# Patient Record
Sex: FEMALE | Race: WHITE | NOT HISPANIC OR LATINO
[De-identification: names, ages, dates, MRNs, and addresses within clinical notes are randomized per-mention and may not be internally consistent; named-entity substitution may affect disease eponyms.]

---

## 2017-02-13 ENCOUNTER — NON-APPOINTMENT (OUTPATIENT)
Age: 67
End: 2017-02-13

## 2017-02-13 ENCOUNTER — APPOINTMENT (OUTPATIENT)
Dept: CARDIOLOGY | Facility: CLINIC | Age: 67
End: 2017-02-13

## 2017-02-13 VITALS
HEART RATE: 59 BPM | DIASTOLIC BLOOD PRESSURE: 83 MMHG | BODY MASS INDEX: 27.56 KG/M2 | WEIGHT: 146 LBS | OXYGEN SATURATION: 96 % | SYSTOLIC BLOOD PRESSURE: 163 MMHG | HEIGHT: 61 IN

## 2017-02-17 ENCOUNTER — APPOINTMENT (OUTPATIENT)
Dept: CARDIOLOGY | Facility: CLINIC | Age: 67
End: 2017-02-17

## 2017-03-02 ENCOUNTER — MEDICATION RENEWAL (OUTPATIENT)
Age: 67
End: 2017-03-02

## 2017-07-26 ENCOUNTER — OTHER (OUTPATIENT)
Age: 67
End: 2017-07-26

## 2017-08-28 ENCOUNTER — APPOINTMENT (OUTPATIENT)
Dept: CARDIOLOGY | Facility: CLINIC | Age: 67
End: 2017-08-28
Payer: MEDICARE

## 2017-08-28 PROCEDURE — 93015 CV STRESS TEST SUPVJ I&R: CPT

## 2017-08-28 PROCEDURE — 78452 HT MUSCLE IMAGE SPECT MULT: CPT

## 2017-08-28 PROCEDURE — A9500: CPT

## 2017-10-17 ENCOUNTER — APPOINTMENT (OUTPATIENT)
Dept: OPHTHALMOLOGY | Facility: CLINIC | Age: 67
End: 2017-10-17
Payer: MEDICARE

## 2017-10-17 PROCEDURE — 92060 SENSORIMOTOR EXAMINATION: CPT

## 2017-10-17 PROCEDURE — 99204 OFFICE O/P NEW MOD 45 MIN: CPT

## 2017-11-13 ENCOUNTER — APPOINTMENT (OUTPATIENT)
Dept: OPHTHALMOLOGY | Facility: CLINIC | Age: 67
End: 2017-11-13
Payer: MEDICARE

## 2017-11-13 PROCEDURE — 92012 INTRM OPH EXAM EST PATIENT: CPT

## 2017-11-13 PROCEDURE — 92060 SENSORIMOTOR EXAMINATION: CPT

## 2017-11-13 PROCEDURE — V2718: CPT

## 2017-11-16 ENCOUNTER — MEDICATION RENEWAL (OUTPATIENT)
Age: 67
End: 2017-11-16

## 2017-12-15 ENCOUNTER — APPOINTMENT (OUTPATIENT)
Dept: OPHTHALMOLOGY | Facility: CLINIC | Age: 67
End: 2017-12-15
Payer: MEDICARE

## 2017-12-15 PROCEDURE — 92012 INTRM OPH EXAM EST PATIENT: CPT

## 2017-12-15 PROCEDURE — 92060 SENSORIMOTOR EXAMINATION: CPT

## 2018-08-07 ENCOUNTER — APPOINTMENT (OUTPATIENT)
Dept: CARDIOLOGY | Facility: CLINIC | Age: 68
End: 2018-08-07
Payer: MEDICARE

## 2018-08-07 ENCOUNTER — NON-APPOINTMENT (OUTPATIENT)
Age: 68
End: 2018-08-07

## 2018-08-07 VITALS
HEART RATE: 66 BPM | OXYGEN SATURATION: 97 % | DIASTOLIC BLOOD PRESSURE: 70 MMHG | WEIGHT: 138 LBS | HEIGHT: 61 IN | SYSTOLIC BLOOD PRESSURE: 147 MMHG | BODY MASS INDEX: 26.06 KG/M2

## 2018-08-07 PROCEDURE — 93000 ELECTROCARDIOGRAM COMPLETE: CPT

## 2018-08-07 PROCEDURE — 99214 OFFICE O/P EST MOD 30 MIN: CPT

## 2018-08-23 ENCOUNTER — APPOINTMENT (OUTPATIENT)
Dept: ENDOCRINOLOGY | Facility: CLINIC | Age: 68
End: 2018-08-23
Payer: MEDICARE

## 2018-08-23 PROCEDURE — 76536 US EXAM OF HEAD AND NECK: CPT

## 2018-09-13 ENCOUNTER — APPOINTMENT (OUTPATIENT)
Dept: CARDIOLOGY | Facility: CLINIC | Age: 68
End: 2018-09-13
Payer: MEDICARE

## 2018-09-13 PROCEDURE — 93306 TTE W/DOPPLER COMPLETE: CPT

## 2018-10-08 ENCOUNTER — RESULT CHARGE (OUTPATIENT)
Age: 68
End: 2018-10-08

## 2018-10-17 ENCOUNTER — APPOINTMENT (OUTPATIENT)
Dept: INTERNAL MEDICINE | Facility: CLINIC | Age: 68
End: 2018-10-17
Payer: MEDICARE

## 2018-10-17 VITALS
DIASTOLIC BLOOD PRESSURE: 68 MMHG | TEMPERATURE: 98.3 F | BODY MASS INDEX: 24.55 KG/M2 | HEIGHT: 61 IN | WEIGHT: 130 LBS | SYSTOLIC BLOOD PRESSURE: 118 MMHG

## 2018-10-17 PROCEDURE — 99213 OFFICE O/P EST LOW 20 MIN: CPT

## 2018-10-17 RX ORDER — CYCLOSPORINE 0.5 MG/ML
0.05 EMULSION OPHTHALMIC
Qty: 180 | Refills: 0 | Status: ACTIVE | COMMUNITY
Start: 2018-08-16

## 2018-10-17 RX ORDER — CONJUGATED ESTROGENS 0.62 MG/G
0.62 CREAM VAGINAL
Qty: 30 | Refills: 0 | Status: ACTIVE | COMMUNITY
Start: 2018-06-01

## 2018-10-17 NOTE — HISTORY OF PRESENT ILLNESS
[FreeTextEntry8] : 69 y/o female pt present with complaints of being off balance.  She started Myrbetriq last week and now dizzy with HA.  Read s/e and saw HA/dizziness on the list.  Her dizziness feels like vertigo, which she's had in the past.  No nausea.  Felt dizzy in bed.

## 2018-10-17 NOTE — PHYSICAL EXAM
[No Acute Distress] : no acute distress [Normal Sclera/Conjunctiva] : normal sclera/conjunctiva [EOMI] : extraocular movements intact [Normal Outer Ear/Nose] : the outer ears and nose were normal in appearance [Normal Oropharynx] : the oropharynx was normal [Supple] : supple [Clear to Auscultation] : lungs were clear to auscultation bilaterally [Normal Rate] : normal rate  [Regular Rhythm] : with a regular rhythm [No Carotid Bruits] : no carotid bruits [No Edema] : there was no peripheral edema [Normal Anterior Cervical Nodes] : no anterior cervical lymphadenopathy

## 2018-10-18 ENCOUNTER — RX RENEWAL (OUTPATIENT)
Age: 68
End: 2018-10-18

## 2018-10-25 ENCOUNTER — APPOINTMENT (OUTPATIENT)
Dept: ENDOCRINOLOGY | Facility: CLINIC | Age: 68
End: 2018-10-25
Payer: MEDICARE

## 2018-10-25 VITALS
WEIGHT: 132 LBS | HEART RATE: 68 BPM | SYSTOLIC BLOOD PRESSURE: 134 MMHG | HEIGHT: 61 IN | BODY MASS INDEX: 24.92 KG/M2 | DIASTOLIC BLOOD PRESSURE: 56 MMHG

## 2018-10-25 LAB
GLUCOSE BLDC GLUCOMTR-MCNC: 42
GLUCOSE BLDC GLUCOMTR-MCNC: 96

## 2018-10-25 PROCEDURE — 99214 OFFICE O/P EST MOD 30 MIN: CPT | Mod: 25

## 2018-10-25 PROCEDURE — 82962 GLUCOSE BLOOD TEST: CPT

## 2018-10-25 RX ORDER — DOXYCYCLINE HYCLATE 100 MG/1
100 CAPSULE ORAL
Refills: 0 | Status: DISCONTINUED | COMMUNITY
Start: 2017-02-13 | End: 2018-10-25

## 2018-10-31 ENCOUNTER — APPOINTMENT (OUTPATIENT)
Dept: INTERNAL MEDICINE | Facility: CLINIC | Age: 68
End: 2018-10-31
Payer: MEDICARE

## 2018-10-31 DIAGNOSIS — Z23 ENCOUNTER FOR IMMUNIZATION: ICD-10-CM

## 2018-10-31 PROCEDURE — G0008: CPT

## 2018-10-31 PROCEDURE — 90662 IIV NO PRSV INCREASED AG IM: CPT

## 2018-11-27 ENCOUNTER — APPOINTMENT (OUTPATIENT)
Dept: ENDOCRINOLOGY | Facility: CLINIC | Age: 68
End: 2018-11-27
Payer: MEDICARE

## 2018-11-27 PROCEDURE — 97803 MED NUTRITION INDIV SUBSEQ: CPT

## 2018-12-05 ENCOUNTER — MEDICATION RENEWAL (OUTPATIENT)
Age: 68
End: 2018-12-05

## 2019-01-18 ENCOUNTER — RECORD ABSTRACTING (OUTPATIENT)
Age: 69
End: 2019-01-18

## 2019-02-01 ENCOUNTER — APPOINTMENT (OUTPATIENT)
Dept: ENDOCRINOLOGY | Facility: CLINIC | Age: 69
End: 2019-02-01
Payer: MEDICARE

## 2019-02-01 VITALS
DIASTOLIC BLOOD PRESSURE: 70 MMHG | HEART RATE: 64 BPM | SYSTOLIC BLOOD PRESSURE: 118 MMHG | WEIGHT: 133 LBS | HEIGHT: 61 IN | BODY MASS INDEX: 25.11 KG/M2 | OXYGEN SATURATION: 98 %

## 2019-02-01 LAB
CHOLEST SERPL-MCNC: 172
HBA1C MFR BLD HPLC: 7.6
LDLC SERPL DIRECT ASSAY-MCNC: 64

## 2019-02-01 PROCEDURE — 82962 GLUCOSE BLOOD TEST: CPT

## 2019-02-01 PROCEDURE — 99215 OFFICE O/P EST HI 40 MIN: CPT | Mod: 25

## 2019-02-01 RX ORDER — HUMAN INSULIN 100 [IU]/ML
100 INJECTION, SUSPENSION SUBCUTANEOUS
Refills: 0 | Status: DISCONTINUED | COMMUNITY
End: 2019-02-01

## 2019-02-01 RX ORDER — LEVOTHYROXINE SODIUM 137 UG/1
137 TABLET ORAL
Refills: 0 | Status: DISCONTINUED | COMMUNITY
End: 2019-02-01

## 2019-02-01 NOTE — REVIEW OF SYSTEMS
[Blurry Vision] : blurred vision [Negative] : Heme/Lymph [FreeTextEntry3] : alittle blurry vision at times

## 2019-02-01 NOTE — ASSESSMENT
[FreeTextEntry1] : T1DM-  suboptimal control\par  pt will call medtronic to see if can get 630 G at [place] \par can have sensor with pump\par middday test reviewd \par \par Increase basla rates as follows \par 8Am- 0.5\par 9 Am - 0.65\par 1030AM 1.15.\par 1230PM 0.9\par \par HypothryoidCont synthroid 0.150 mg qd\par stop alpha lipodic acid as may affect thyroid levels \par Anmeia see pMD \par  spasm in legs hydrate see pMD\par  Graves opthalmopathy- stable- never treated for hyperthyridism in past - only hypothyridism Pt with antibodies for both GD and HT \par

## 2019-02-01 NOTE — PHYSICAL EXAM
[Alert] : alert [No Acute Distress] : no acute distress [Well Nourished] : well nourished [Well Developed] : well developed [Normal Sclera/Conjunctiva] : normal sclera/conjunctiva [EOMI] : extra ocular movement intact [No Proptosis] : no proptosis [Thyroid Not Enlarged] : the thyroid was not enlarged [No Thyroid Nodules] : there were no palpable thyroid nodules [No Respiratory Distress] : no respiratory distress [No Accessory Muscle Use] : no accessory muscle use [Clear to Auscultation] : lungs were clear to auscultation bilaterally [Normal Rate] : heart rate was normal  [Normal S1, S2] : normal S1 and S2 [Regular Rhythm] : with a regular rhythm [Pedal Pulses Normal] : the pedal pulses are present [No Edema] : there was no peripheral edema [Post Cervical Nodes] : posterior cervical nodes [Anterior Cervical Nodes] : anterior cervical nodes [Normal] : normal and non tender [No Spinal Tenderness] : no spinal tenderness [Spine Straight] : spine straight [No Stigmata of Cushings Syndrome] : no stigmata of cushings syndrome [No Rash] : no rash [Normal Reflexes] : deep tendon reflexes were 2+ and symmetric [Oriented x3] : oriented to person, place, and time [Acanthosis Nigricans] : no acanthosis nigricans

## 2019-02-04 LAB — GLUCOSE BLDC GLUCOMTR-MCNC: 96

## 2019-04-03 ENCOUNTER — RX RENEWAL (OUTPATIENT)
Age: 69
End: 2019-04-03

## 2019-04-08 ENCOUNTER — RX CHANGE (OUTPATIENT)
Age: 69
End: 2019-04-08

## 2019-04-08 ENCOUNTER — RX RENEWAL (OUTPATIENT)
Age: 69
End: 2019-04-08

## 2019-04-08 ENCOUNTER — MEDICATION RENEWAL (OUTPATIENT)
Age: 69
End: 2019-04-08

## 2019-04-09 ENCOUNTER — RX RENEWAL (OUTPATIENT)
Age: 69
End: 2019-04-09

## 2019-04-22 ENCOUNTER — RX RENEWAL (OUTPATIENT)
Age: 69
End: 2019-04-22

## 2019-05-01 ENCOUNTER — APPOINTMENT (OUTPATIENT)
Dept: ENDOCRINOLOGY | Facility: CLINIC | Age: 69
End: 2019-05-01
Payer: MEDICARE

## 2019-05-01 VITALS
DIASTOLIC BLOOD PRESSURE: 64 MMHG | WEIGHT: 137 LBS | SYSTOLIC BLOOD PRESSURE: 120 MMHG | HEART RATE: 67 BPM | BODY MASS INDEX: 25.86 KG/M2 | HEIGHT: 61 IN

## 2019-05-01 LAB — GLUCOSE BLDC GLUCOMTR-MCNC: 177

## 2019-05-01 PROCEDURE — 99214 OFFICE O/P EST MOD 30 MIN: CPT | Mod: 25

## 2019-05-01 PROCEDURE — 82962 GLUCOSE BLOOD TEST: CPT

## 2019-05-01 PROCEDURE — 95251 CONT GLUC MNTR ANALYSIS I&R: CPT

## 2019-05-01 RX ORDER — AZELASTINE HYDROCHLORIDE 137 UG/1
137 SPRAY, METERED NASAL
Qty: 30 | Refills: 0 | Status: DISCONTINUED | COMMUNITY
Start: 2018-07-12 | End: 2019-05-01

## 2019-05-01 NOTE — REVIEW OF SYSTEMS
[Recent Weight Gain (___ Lbs)] : recent [unfilled] ~Ulb weight gain [Dry Eyes] : dryness of the eyes [Constipation] : constipation [Dry Skin] : dry skin [Anxiety] : anxiety [Fatigue] : no fatigue [Decreased Appetite] : appetite not decreased [Visual Field Defect] : no visual field defect [Blurry Vision] : no blurred vision [Dysphagia] : no dysphagia [Dysphonia] : no dysphonia [Neck Pain] : no neck pain [Chest Pain] : no chest pain [Palpitations] : no palpitations [SOB on Exertion] : no shortness of breath during exertion [Polyuria] : no polyuria [Diarrhea] : no diarrhea [Dysuria] : no dysuria [Hair Loss] : no hair loss [Tremors] : no tremors [Headache] : no headaches [Depression] : no depression [Polydipsia] : no polydipsia [Cold Intolerance] : cold tolerant [Heat Intolerance] : heat tolerant [Easy Bruising] : no tendency for easy bruising [Swelling] : no swelling [FreeTextEntry7] : sometimes  [de-identified] : at times

## 2019-05-01 NOTE — HISTORY OF PRESENT ILLNESS
[FreeTextEntry1] : Quality: Type 1 DM\par Severity: moderate\par Duration: 48 years\par Onset: legs cramps\par Modifying Factors: Better with insulin \par Associated Symptoms:\par \par Current Regimen:\par Novolog via Medtronic pump \par \par - Levothyroxine 150 mcg daily \par \par Self blood sugar monitorin-10 times a day, per Medtronic pump download - high blood sugar at bedtime and over night, \par 3 inconsistent lows that were corrected with food\par pt. reports a lot of bubbles in the tube when changing the pump site\par \par exercise: yuridia, drums, 3-4 times a week\par \par Diet:\par B- dry cereal, oatmeal, toast -low carb bread\par L- salad, fish, \par D- fish with vegetable, starch, no too much pasta\par Snacks- fruit, sugar free Jello, thin sugar free cookies\par \par Date of last eye exam: 2019 (-) diabetic retinopathy \par Date of last foot exam: 2019\par Date of last flu vaccine: 2018\par Date of last Pneumovax: 5 years ago

## 2019-05-01 NOTE — ASSESSMENT
[FreeTextEntry1] : 69 y/o female with Type 1 DM, Hypothyroidism, and Hyperlipidemia. Labs reviewed from 4/27/19 - FBG 98, chol 187, LDL 61, A1C 7.4%, anemic, TSH 4.89, and Vitamin D 48\par \par Plan: \par Type 1 DM: adjusted Medtronic pump basal settings:\par 20:00 0.700 to 0.800\par 22:00 0.850 to 0.900\par 23:00 0.900 to 1.00\par - continue all other pump settings - see attached\par - educated on healthy food choices\par - encouraged to continue routine exercise\par - schedule appointment with CDE for pump upgrade and sensor use, along with pump download at time of visit\par \par Hypothyroidism: Increase Levothyroxine to 150 mcg Monday-Saturday and 2 tablets on Sunday\par - repeat TFTs in 4 weeks\par \par Hyperlipidemia: monitor labs, continue Praluent\par \par Labs and follow up visit in 3 months.

## 2019-05-01 NOTE — PHYSICAL EXAM
[Alert] : alert [No Acute Distress] : no acute distress [Well Developed] : well developed [Well Nourished] : well nourished [Normal Sclera/Conjunctiva] : normal sclera/conjunctiva [EOMI] : extra ocular movement intact [Supple] : the neck was supple [Normal Hearing] : hearing was normal [Thyroid Not Enlarged] : the thyroid was not enlarged [No LAD] : no lymphadenopathy [Normal Rate and Effort] : normal respiratory rhythm and effort [No Thyroid Nodules] : there were no palpable thyroid nodules [Clear to Auscultation] : lungs were clear to auscultation bilaterally [No Accessory Muscle Use] : no accessory muscle use [Normal Rate] : heart rate was normal  [Regular Rhythm] : with a regular rhythm [Normal S1, S2] : normal S1 and S2 [Pedal Pulses Normal] : the pedal pulses are present [No Edema] : there was no peripheral edema [Normal Bowel Sounds] : normal bowel sounds [Not Tender] : non-tender [Soft] : abdomen soft [Normal Gait] : normal gait [No Rash] : no rash [Acanthosis Nigricans] : no acanthosis nigricans [Right Foot Was Examined] : right foot ~C was examined [Left Foot Was Examined] : left foot ~C was examined [Normal] : normal [Full ROM] : with full range of motion [Diminished Throughout Both Feet] : normal tactile sensation with monofilament testing throughout both feet [No Tremors] : no tremors [No Motor Deficits] : the motor exam was normal [Oriented x3] : oriented to person, place, and time [Normal Insight/Judgement] : insight and judgment were intact [Normal Mood] : the mood was normal

## 2019-05-01 NOTE — REASON FOR VISIT
[Follow-Up: _____] : a [unfilled] follow-up visit [FreeTextEntry1] : Type 1 DM, Hypothyroidism, and Hyperlipidemia.

## 2019-05-03 ENCOUNTER — MEDICATION RENEWAL (OUTPATIENT)
Age: 69
End: 2019-05-03

## 2019-05-30 ENCOUNTER — APPOINTMENT (OUTPATIENT)
Dept: ENDOCRINOLOGY | Facility: CLINIC | Age: 69
End: 2019-05-30
Payer: MEDICARE

## 2019-05-30 PROCEDURE — G0108 DIAB MANAGE TRN  PER INDIV: CPT

## 2019-06-02 ENCOUNTER — OTHER (OUTPATIENT)
Age: 69
End: 2019-06-02

## 2019-06-04 LAB — HBA1C MFR BLD HPLC: 7.4

## 2019-06-05 ENCOUNTER — MEDICATION RENEWAL (OUTPATIENT)
Age: 69
End: 2019-06-05

## 2019-08-29 ENCOUNTER — APPOINTMENT (OUTPATIENT)
Dept: ENDOCRINOLOGY | Facility: CLINIC | Age: 69
End: 2019-08-29
Payer: MEDICARE

## 2019-08-29 VITALS
DIASTOLIC BLOOD PRESSURE: 60 MMHG | WEIGHT: 135 LBS | SYSTOLIC BLOOD PRESSURE: 100 MMHG | HEART RATE: 65 BPM | BODY MASS INDEX: 25.49 KG/M2 | HEIGHT: 61 IN

## 2019-08-29 LAB — GLUCOSE BLDC GLUCOMTR-MCNC: 124

## 2019-08-29 PROCEDURE — 82962 GLUCOSE BLOOD TEST: CPT

## 2019-08-29 PROCEDURE — 95251 CONT GLUC MNTR ANALYSIS I&R: CPT

## 2019-08-29 PROCEDURE — 99214 OFFICE O/P EST MOD 30 MIN: CPT | Mod: 25

## 2019-09-03 ENCOUNTER — TRANSCRIPTION ENCOUNTER (OUTPATIENT)
Age: 69
End: 2019-09-03

## 2019-09-05 ENCOUNTER — OTHER (OUTPATIENT)
Age: 69
End: 2019-09-05

## 2019-09-05 ENCOUNTER — NON-APPOINTMENT (OUTPATIENT)
Age: 69
End: 2019-09-05

## 2019-09-05 ENCOUNTER — APPOINTMENT (OUTPATIENT)
Dept: CARDIOLOGY | Facility: CLINIC | Age: 69
End: 2019-09-05
Payer: MEDICARE

## 2019-09-05 VITALS
HEIGHT: 61 IN | DIASTOLIC BLOOD PRESSURE: 56 MMHG | SYSTOLIC BLOOD PRESSURE: 153 MMHG | BODY MASS INDEX: 26.06 KG/M2 | OXYGEN SATURATION: 99 % | HEART RATE: 84 BPM | WEIGHT: 138 LBS

## 2019-09-05 PROCEDURE — 99214 OFFICE O/P EST MOD 30 MIN: CPT

## 2019-09-05 PROCEDURE — 93000 ELECTROCARDIOGRAM COMPLETE: CPT

## 2019-09-09 ENCOUNTER — MEDICATION RENEWAL (OUTPATIENT)
Age: 69
End: 2019-09-09

## 2019-09-23 ENCOUNTER — MEDICATION RENEWAL (OUTPATIENT)
Age: 69
End: 2019-09-23

## 2019-10-04 NOTE — ASSESSMENT
[FreeTextEntry1] : 67 y/o female with Type 1 DM, Hypothyroidism, and Hyperlipidemia. \par \par Plan: \par Type 1 DM: adjusted Medtronic pump  carb ratio adjusted in afternoon \par change segment times on pump-  carb ratio  and  reduced carb  ratio to make less aggressive in afternoon \par  12 Am  to 6 Am 10\par 6AM to 12 PM 14 \par 12PM- 4PM 16 \par 4pm -12AM 18  (same) \par \par Rose reviewd  avg   +/- 72.1  Coeff of variaiton 42.9 \par 41% of time above target \par 54% in targert range\par below target5% \par worn 97% of time  \par \par - educated on healthy food choices\par - encouraged to continue routine exercise\par \par \par Hypothyroidism: Cont Lt4  150 mcg Monday-Saturday and 2 tablets on Sunday\par - repeat TFTs in 4 weeks\par \par 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

## 2019-10-04 NOTE — REASON FOR VISIT
[Follow-Up: _____] : a [unfilled] follow-up visit [FreeTextEntry1] : Type 1 DM, Hypothyroidism, and Hyperlipidemia.   Gravesophtalmopathy

## 2019-10-04 NOTE — HISTORY OF PRESENT ILLNESS
[FreeTextEntry1] : Quality: Type 1 DM\par Severity: moderate\par Duration: 48 years\par Onset: legs cramps\par Modifying Factors: Better with insulin \par Associated Symptoms:\par \par Current Regimen:\par Novolog via Medtronic pump \par \par - Levothyroxine 150 mcg daily \par \par Self blood sugar monitorin-10 times a day, per Medtronic pump download - high blood sugar at bedtime and over night, \par 3 inconsistent lows that were corrected with food\par pt. reports a lot of bubbles in the tube when changing the pump site\par \par exercise: yuridia, drums, 3-4 times a week\par \par Diet:\par B- dry cereal, oatmeal, toast -low carb bread\par L- salad, fish, \par D- fish with vegetable, starch, no too much pasta\par Snacks- fruit, sugar free Jello, thin sugar free cookies\par \par Date of last eye exam: 2019 (-) diabetic retinopathy \par Date of last foot exam: 2019\par Date of last flu vaccine: 2018\par Date of last Pneumovax: 5 years ago\par has been off cholesterol meds for a few motnhs now \par  \par  Has to see GI \par \par Variable bs - tend to drop lower int he afternoon \par  has been veryactive lately

## 2019-10-04 NOTE — REVIEW OF SYSTEMS
[Recent Weight Gain (___ Lbs)] : recent [unfilled] ~Ulb weight gain [Dry Eyes] : dryness of the eyes [Constipation] : constipation [Dry Skin] : dry skin [Anxiety] : anxiety [Fatigue] : no fatigue [Decreased Appetite] : appetite not decreased [Visual Field Defect] : no visual field defect [Blurry Vision] : no blurred vision [Dysphonia] : no dysphonia [Dysphagia] : no dysphagia [Neck Pain] : no neck pain [Chest Pain] : no chest pain [Palpitations] : no palpitations [SOB on Exertion] : no shortness of breath during exertion [Diarrhea] : no diarrhea [Polyuria] : no polyuria [Dysuria] : no dysuria [Hair Loss] : no hair loss [Headache] : no headaches [Tremors] : no tremors [Depression] : no depression [Cold Intolerance] : cold tolerant [Polydipsia] : no polydipsia [Heat Intolerance] : heat tolerant [Easy Bruising] : no tendency for easy bruising [Swelling] : no swelling [FreeTextEntry7] : sometimes  [de-identified] : at times

## 2019-10-30 ENCOUNTER — RX RENEWAL (OUTPATIENT)
Age: 69
End: 2019-10-30

## 2019-11-11 LAB
HBA1C MFR BLD HPLC: 7.2
LDLC SERPL DIRECT ASSAY-MCNC: 68

## 2019-11-12 ENCOUNTER — APPOINTMENT (OUTPATIENT)
Dept: ENDOCRINOLOGY | Facility: CLINIC | Age: 69
End: 2019-11-12
Payer: MEDICARE

## 2019-11-12 VITALS
SYSTOLIC BLOOD PRESSURE: 128 MMHG | HEART RATE: 72 BPM | BODY MASS INDEX: 24.92 KG/M2 | OXYGEN SATURATION: 98 % | WEIGHT: 132 LBS | DIASTOLIC BLOOD PRESSURE: 70 MMHG | HEIGHT: 61 IN

## 2019-11-12 LAB — GLUCOSE BLDC GLUCOMTR-MCNC: 301

## 2019-11-12 PROCEDURE — 82962 GLUCOSE BLOOD TEST: CPT

## 2019-11-12 PROCEDURE — 95251 CONT GLUC MNTR ANALYSIS I&R: CPT

## 2019-11-12 PROCEDURE — 99214 OFFICE O/P EST MOD 30 MIN: CPT | Mod: 25

## 2019-11-12 NOTE — PHYSICAL EXAM
[Alert] : alert [Well Nourished] : well nourished [No Acute Distress] : no acute distress [Normal Sclera/Conjunctiva] : normal sclera/conjunctiva [Well Developed] : well developed [Normal Hearing] : hearing was normal [EOMI] : extra ocular movement intact [No LAD] : no lymphadenopathy [Supple] : the neck was supple [Thyroid Not Enlarged] : the thyroid was not enlarged [No Thyroid Nodules] : there were no palpable thyroid nodules [No Accessory Muscle Use] : no accessory muscle use [Normal Rate and Effort] : normal respiratory rhythm and effort [Clear to Auscultation] : lungs were clear to auscultation bilaterally [Normal S1, S2] : normal S1 and S2 [Normal Rate] : heart rate was normal  [Pedal Pulses Normal] : the pedal pulses are present [Regular Rhythm] : with a regular rhythm [No Edema] : there was no peripheral edema [Normal Bowel Sounds] : normal bowel sounds [Soft] : abdomen soft [Not Tender] : non-tender [No Rash] : no rash [Normal Gait] : normal gait [Acanthosis Nigricans] : no acanthosis nigricans [Right Foot Was Examined] : right foot ~C was examined [Left Foot Was Examined] : left foot ~C was examined [Normal] : normal [Full ROM] : with full range of motion [Diminished Throughout Both Feet] : normal tactile sensation with monofilament testing throughout both feet [No Motor Deficits] : the motor exam was normal [No Tremors] : no tremors [Oriented x3] : oriented to person, place, and time [Normal Insight/Judgement] : insight and judgment were intact [Normal Mood] : the mood was normal

## 2019-11-12 NOTE — REVIEW OF SYSTEMS
[Dry Eyes] : dryness of the eyes [Constipation] : constipation [Dry Skin] : dry skin [Fatigue] : no fatigue [Decreased Appetite] : appetite not decreased [Recent Weight Gain (___ Lbs)] : no recent weight gain [Recent Weight Loss (___ Lbs)] : no recent weight loss [Visual Field Defect] : no visual field defect [Blurry Vision] : no blurred vision [Dysphonia] : no dysphonia [Dysphagia] : no dysphagia [Neck Pain] : no neck pain [Chest Pain] : no chest pain [Palpitations] : no palpitations [Diarrhea] : no diarrhea [Dysuria] : no dysuria [Polyuria] : no polyuria [Headache] : no headaches [Hair Loss] : no hair loss [Tremors] : no tremors [Depression] : no depression [Polydipsia] : no polydipsia [Anxiety] : no anxiety [Heat Intolerance] : heat tolerant [Cold Intolerance] : cold tolerant [Easy Bruising] : no tendency for easy bruising [Swelling] : no swelling [FreeTextEntry2] : weight stable

## 2019-11-12 NOTE — HISTORY OF PRESENT ILLNESS
[FreeTextEntry1] : Quality: Type 1 DM\par Severity: moderate\par Duration: 48 years\par Onset: legs cramps\par Modifying Factors: Better with insulin \par Associated Symptoms:\par \par Current Regimen:\par Novolog via Medtronic pump \par \par -Levothyroxine to 150 mcg Monday-Saturday and 2 tablets on \par \par Self blood sugar monitorin-10 times a day, per Medtronic pump download - high blood sugar with lunch and dinner, \par Rose Personal sensor: average glucose 194, standard deviation 68.2, Above 180 - 54%, in target 45%, and below 70 1%\par low glucose of 43 due to pt. over correcting, she did correct BS with carbs\par \par exercise: yuridia, drums, 3-4 times a week\par \par Diet: same\par B- dry cereal, oatmeal, toast -low carb bread\par L- salad, fish, \par D- fish with vegetable, starch, no too much pasta\par Snacks- fruit, sugar free Jello, thin sugar free cookies\par \par Date of last eye exam: 10/2019 (-) diabetic retinopathy \par Date of last foot exam: 1 month ago with podiatry \par Date of last flu vaccine: \par Date of last Pneumovax: 5 years ago

## 2019-11-12 NOTE — REASON FOR VISIT
[Follow-Up: _____] : a [unfilled] follow-up visit [FreeTextEntry1] : Type 1 DM, Hypothyroidism, and Hyperlipidemia

## 2019-11-12 NOTE — ASSESSMENT
[FreeTextEntry1] : 70 y/o female with Type 1 DM, Hypothyroidism, and Hyperlipidemia. No recent labs. \par \par Plan: \par Type 1 DM: check A1C now - need updated labs\par - blood sugars high at lunch and dinner, needs close follow up \par - adjusted Medtronic pump carb ratio:\par 12:00 22 to 18 \par 16:00 22 to 20 \par \par - continue all other pump settings - see attached\par - educated on healthy food choices\par - encouraged to continue routine exercise\par \par Hypothyroidism: clinically euthyroid on replacement \par - continue Levothyroxine to 150 mcg Monday-Saturday and 2 tablets on Sunday\par - repeat TFTs in 3 months \par \par Hyperlipidemia: monitor lipids, continue Praluent\par \par Follow up visit in 4-5 weeks with CDE for pump download.  [Importance of Diet and Exercise] : importance of diet and exercise to improve glycemic control, achieve weight loss and improve cardiovascular health

## 2019-11-19 ENCOUNTER — RESULT REVIEW (OUTPATIENT)
Age: 69
End: 2019-11-19

## 2019-11-19 LAB
25(OH)D3 SERPL-MCNC: 49.2 NG/ML
ALBUMIN SERPL ELPH-MCNC: 4.2 G/DL
ALP BLD-CCNC: 88 U/L
ALT SERPL-CCNC: 15 U/L
ANION GAP SERPL CALC-SCNC: 14 MMOL/L
AST SERPL-CCNC: 17 U/L
BASOPHILS # BLD AUTO: 0.06 K/UL
BASOPHILS NFR BLD AUTO: 0.7 %
BILIRUB SERPL-MCNC: 0.2 MG/DL
BUN SERPL-MCNC: 22 MG/DL
CALCIUM SERPL-MCNC: 9.6 MG/DL
CHLORIDE SERPL-SCNC: 100 MMOL/L
CHOLEST SERPL-MCNC: 183 MG/DL
CHOLEST/HDLC SERPL: 1.7 RATIO
CO2 SERPL-SCNC: 29 MMOL/L
CREAT SERPL-MCNC: 0.75 MG/DL
CREAT SPEC-SCNC: 116 MG/DL
EOSINOPHIL # BLD AUTO: 0.09 K/UL
EOSINOPHIL NFR BLD AUTO: 1.1 %
ESTIMATED AVERAGE GLUCOSE: 174 MG/DL
GLUCOSE SERPL-MCNC: 204 MG/DL
HBA1C MFR BLD HPLC: 7.7 %
HCT VFR BLD CALC: 38.9 %
HDLC SERPL-MCNC: 106 MG/DL
HGB BLD-MCNC: 12.6 G/DL
IMM GRANULOCYTES NFR BLD AUTO: 0.2 %
LDLC SERPL CALC-MCNC: 67 MG/DL
LYMPHOCYTES # BLD AUTO: 1.53 K/UL
LYMPHOCYTES NFR BLD AUTO: 18.3 %
MAN DIFF?: NORMAL
MCHC RBC-ENTMCNC: 30.7 PG
MCHC RBC-ENTMCNC: 32.4 GM/DL
MCV RBC AUTO: 94.6 FL
MICROALBUMIN 24H UR DL<=1MG/L-MCNC: <1.2 MG/DL
MICROALBUMIN/CREAT 24H UR-RTO: NORMAL MG/G
MONOCYTES # BLD AUTO: 0.81 K/UL
MONOCYTES NFR BLD AUTO: 9.7 %
NEUTROPHILS # BLD AUTO: 5.86 K/UL
NEUTROPHILS NFR BLD AUTO: 70 %
PLATELET # BLD AUTO: 241 K/UL
POTASSIUM SERPL-SCNC: 4.1 MMOL/L
PROT SERPL-MCNC: 6.6 G/DL
RBC # BLD: 4.11 M/UL
RBC # FLD: 14 %
SODIUM SERPL-SCNC: 143 MMOL/L
T4 FREE SERPL-MCNC: 1.6 NG/DL
TRIGL SERPL-MCNC: 49 MG/DL
TSH SERPL-ACNC: 0.57 UIU/ML
VIT B12 SERPL-MCNC: 1391 PG/ML
WBC # FLD AUTO: 8.37 K/UL

## 2019-12-18 ENCOUNTER — APPOINTMENT (OUTPATIENT)
Dept: INTERNAL MEDICINE | Facility: CLINIC | Age: 69
End: 2019-12-18
Payer: MEDICARE

## 2019-12-18 VITALS
RESPIRATION RATE: 14 BRPM | TEMPERATURE: 98.2 F | SYSTOLIC BLOOD PRESSURE: 120 MMHG | HEART RATE: 82 BPM | OXYGEN SATURATION: 97 % | DIASTOLIC BLOOD PRESSURE: 40 MMHG | BODY MASS INDEX: 25.13 KG/M2 | WEIGHT: 133 LBS

## 2019-12-18 DIAGNOSIS — Z87.09 PERSONAL HISTORY OF OTHER DISEASES OF THE RESPIRATORY SYSTEM: ICD-10-CM

## 2019-12-18 PROCEDURE — 99214 OFFICE O/P EST MOD 30 MIN: CPT

## 2019-12-18 PROCEDURE — 99204 OFFICE O/P NEW MOD 45 MIN: CPT

## 2019-12-18 RX ORDER — DOXYCYCLINE HYCLATE 20 MG/1
20 TABLET ORAL
Qty: 180 | Refills: 0 | Status: DISCONTINUED | COMMUNITY
Start: 2018-07-06 | End: 2019-12-18

## 2019-12-18 NOTE — REVIEW OF SYSTEMS
[Fatigue] : fatigue [Chills] : chills [Nasal Discharge] : nasal discharge [Cough] : cough [Negative] : Heme/Lymph

## 2019-12-18 NOTE — PHYSICAL EXAM
[No Acute Distress] : no acute distress [Well Nourished] : well nourished [Well Developed] : well developed [Well-Appearing] : well-appearing [Normal Sclera/Conjunctiva] : normal sclera/conjunctiva [EOMI] : extraocular movements intact [PERRL] : pupils equal round and reactive to light [No JVD] : no jugular venous distention [No Lymphadenopathy] : no lymphadenopathy [Supple] : supple [Thyroid Normal, No Nodules] : the thyroid was normal and there were no nodules present [No Respiratory Distress] : no respiratory distress  [No Accessory Muscle Use] : no accessory muscle use [Normal Rate] : normal rate  [Clear to Auscultation] : lungs were clear to auscultation bilaterally [Regular Rhythm] : with a regular rhythm [No Murmur] : no murmur heard [Normal S1, S2] : normal S1 and S2 [No Varicosities] : no varicosities [No Abdominal Bruit] : a ~M bruit was not heard ~T in the abdomen [No Carotid Bruits] : no carotid bruits [Pedal Pulses Present] : the pedal pulses are present [No Edema] : there was no peripheral edema [No Extremity Clubbing/Cyanosis] : no extremity clubbing/cyanosis [No Palpable Aorta] : no palpable aorta [Soft] : abdomen soft [Non Tender] : non-tender [Non-distended] : non-distended [No Masses] : no abdominal mass palpated [No HSM] : no HSM [Normal Bowel Sounds] : normal bowel sounds [Normal Posterior Cervical Nodes] : no posterior cervical lymphadenopathy [Normal Anterior Cervical Nodes] : no anterior cervical lymphadenopathy [No CVA Tenderness] : no CVA  tenderness [No Joint Swelling] : no joint swelling [No Spinal Tenderness] : no spinal tenderness [Grossly Normal Strength/Tone] : grossly normal strength/tone [No Rash] : no rash [Coordination Grossly Intact] : coordination grossly intact [No Focal Deficits] : no focal deficits [Normal Gait] : normal gait [Normal Affect] : the affect was normal [Deep Tendon Reflexes (DTR)] : deep tendon reflexes were 2+ and symmetric [Normal Insight/Judgement] : insight and judgment were intact [de-identified] : tm dull

## 2019-12-20 ENCOUNTER — APPOINTMENT (OUTPATIENT)
Dept: ENDOCRINOLOGY | Facility: CLINIC | Age: 69
End: 2019-12-20
Payer: MEDICARE

## 2019-12-20 ENCOUNTER — RESULT CHARGE (OUTPATIENT)
Age: 69
End: 2019-12-20

## 2019-12-20 ENCOUNTER — APPOINTMENT (OUTPATIENT)
Dept: ENDOCRINOLOGY | Facility: CLINIC | Age: 69
End: 2019-12-20

## 2019-12-20 PROCEDURE — G0108 DIAB MANAGE TRN  PER INDIV: CPT

## 2020-01-02 ENCOUNTER — MEDICATION RENEWAL (OUTPATIENT)
Age: 70
End: 2020-01-02

## 2020-01-02 ENCOUNTER — APPOINTMENT (OUTPATIENT)
Dept: INTERNAL MEDICINE | Facility: CLINIC | Age: 70
End: 2020-01-02

## 2020-01-20 ENCOUNTER — APPOINTMENT (OUTPATIENT)
Dept: INTERNAL MEDICINE | Facility: CLINIC | Age: 70
End: 2020-01-20
Payer: MEDICARE

## 2020-01-20 ENCOUNTER — INPATIENT (INPATIENT)
Facility: HOSPITAL | Age: 70
LOS: 0 days | Discharge: HOME | DRG: 69 | End: 2020-01-21
Attending: INTERNAL MEDICINE | Admitting: INTERNAL MEDICINE
Payer: COMMERCIAL

## 2020-01-20 VITALS
HEART RATE: 68 BPM | RESPIRATION RATE: 18 BRPM | SYSTOLIC BLOOD PRESSURE: 221 MMHG | TEMPERATURE: 98 F | DIASTOLIC BLOOD PRESSURE: 68 MMHG | HEIGHT: 61 IN | OXYGEN SATURATION: 98 % | WEIGHT: 128.97 LBS

## 2020-01-20 VITALS
RESPIRATION RATE: 14 BRPM | HEIGHT: 61 IN | OXYGEN SATURATION: 97 % | HEART RATE: 65 BPM | SYSTOLIC BLOOD PRESSURE: 158 MMHG | TEMPERATURE: 97.5 F | WEIGHT: 129 LBS | BODY MASS INDEX: 24.35 KG/M2 | DIASTOLIC BLOOD PRESSURE: 70 MMHG

## 2020-01-20 DIAGNOSIS — I63.9 CEREBRAL INFARCTION, UNSPECIFIED: ICD-10-CM

## 2020-01-20 DIAGNOSIS — Z98.890 OTHER SPECIFIED POSTPROCEDURAL STATES: Chronic | ICD-10-CM

## 2020-01-20 LAB
ALBUMIN SERPL ELPH-MCNC: 3.6 G/DL — SIGNIFICANT CHANGE UP (ref 3.3–5)
ALP SERPL-CCNC: 125 U/L — HIGH (ref 40–120)
ALT FLD-CCNC: 26 U/L — SIGNIFICANT CHANGE UP (ref 12–78)
ANION GAP SERPL CALC-SCNC: 7 MMOL/L — SIGNIFICANT CHANGE UP (ref 5–17)
APTT BLD: 32.5 SEC — SIGNIFICANT CHANGE UP (ref 28.5–37)
AST SERPL-CCNC: 20 U/L — SIGNIFICANT CHANGE UP (ref 15–37)
BASOPHILS # BLD AUTO: 0.04 K/UL — SIGNIFICANT CHANGE UP (ref 0–0.2)
BASOPHILS NFR BLD AUTO: 0.6 % — SIGNIFICANT CHANGE UP (ref 0–2)
BILIRUB SERPL-MCNC: 0.3 MG/DL — SIGNIFICANT CHANGE UP (ref 0.2–1.2)
BUN SERPL-MCNC: 25 MG/DL — HIGH (ref 7–23)
CALCIUM SERPL-MCNC: 8.8 MG/DL — SIGNIFICANT CHANGE UP (ref 8.5–10.1)
CHLORIDE SERPL-SCNC: 102 MMOL/L — SIGNIFICANT CHANGE UP (ref 96–108)
CO2 SERPL-SCNC: 29 MMOL/L — SIGNIFICANT CHANGE UP (ref 22–31)
CREAT SERPL-MCNC: 0.8 MG/DL — SIGNIFICANT CHANGE UP (ref 0.5–1.3)
EOSINOPHIL # BLD AUTO: 0.19 K/UL — SIGNIFICANT CHANGE UP (ref 0–0.5)
EOSINOPHIL NFR BLD AUTO: 2.7 % — SIGNIFICANT CHANGE UP (ref 0–6)
GLUCOSE SERPL-MCNC: 301 MG/DL — HIGH (ref 70–99)
HCT VFR BLD CALC: 38.4 % — SIGNIFICANT CHANGE UP (ref 34.5–45)
HGB BLD-MCNC: 12.9 G/DL — SIGNIFICANT CHANGE UP (ref 11.5–15.5)
IMM GRANULOCYTES NFR BLD AUTO: 0.4 % — SIGNIFICANT CHANGE UP (ref 0–1.5)
INR BLD: 0.97 RATIO — SIGNIFICANT CHANGE UP (ref 0.88–1.16)
LYMPHOCYTES # BLD AUTO: 1.35 K/UL — SIGNIFICANT CHANGE UP (ref 1–3.3)
LYMPHOCYTES # BLD AUTO: 18.9 % — SIGNIFICANT CHANGE UP (ref 13–44)
MCHC RBC-ENTMCNC: 30.7 PG — SIGNIFICANT CHANGE UP (ref 27–34)
MCHC RBC-ENTMCNC: 33.6 GM/DL — SIGNIFICANT CHANGE UP (ref 32–36)
MCV RBC AUTO: 91.4 FL — SIGNIFICANT CHANGE UP (ref 80–100)
MONOCYTES # BLD AUTO: 0.67 K/UL — SIGNIFICANT CHANGE UP (ref 0–0.9)
MONOCYTES NFR BLD AUTO: 9.4 % — SIGNIFICANT CHANGE UP (ref 2–14)
NEUTROPHILS # BLD AUTO: 4.88 K/UL — SIGNIFICANT CHANGE UP (ref 1.8–7.4)
NEUTROPHILS NFR BLD AUTO: 68 % — SIGNIFICANT CHANGE UP (ref 43–77)
NRBC # BLD: 0 /100 WBCS — SIGNIFICANT CHANGE UP (ref 0–0)
PLATELET # BLD AUTO: 225 K/UL — SIGNIFICANT CHANGE UP (ref 150–400)
POTASSIUM SERPL-MCNC: 3.8 MMOL/L — SIGNIFICANT CHANGE UP (ref 3.5–5.3)
POTASSIUM SERPL-SCNC: 3.8 MMOL/L — SIGNIFICANT CHANGE UP (ref 3.5–5.3)
PROT SERPL-MCNC: 7.3 G/DL — SIGNIFICANT CHANGE UP (ref 6–8.3)
PROTHROM AB SERPL-ACNC: 11.1 SEC — SIGNIFICANT CHANGE UP (ref 10–12.9)
RBC # BLD: 4.2 M/UL — SIGNIFICANT CHANGE UP (ref 3.8–5.2)
RBC # FLD: 13.5 % — SIGNIFICANT CHANGE UP (ref 10.3–14.5)
SODIUM SERPL-SCNC: 138 MMOL/L — SIGNIFICANT CHANGE UP (ref 135–145)
TROPONIN I SERPL-MCNC: <.015 NG/ML — SIGNIFICANT CHANGE UP (ref 0.01–0.04)
WBC # BLD: 7.16 K/UL — SIGNIFICANT CHANGE UP (ref 3.8–10.5)
WBC # FLD AUTO: 7.16 K/UL — SIGNIFICANT CHANGE UP (ref 3.8–10.5)

## 2020-01-20 PROCEDURE — 99285 EMERGENCY DEPT VISIT HI MDM: CPT

## 2020-01-20 PROCEDURE — 93306 TTE W/DOPPLER COMPLETE: CPT | Mod: 26

## 2020-01-20 PROCEDURE — 70496 CT ANGIOGRAPHY HEAD: CPT | Mod: 26

## 2020-01-20 PROCEDURE — 70498 CT ANGIOGRAPHY NECK: CPT | Mod: 26

## 2020-01-20 PROCEDURE — 93010 ELECTROCARDIOGRAM REPORT: CPT

## 2020-01-20 PROCEDURE — 99215 OFFICE O/P EST HI 40 MIN: CPT

## 2020-01-20 PROCEDURE — 99221 1ST HOSP IP/OBS SF/LOW 40: CPT | Mod: AI

## 2020-01-20 PROCEDURE — 70450 CT HEAD/BRAIN W/O DYE: CPT | Mod: 26,59

## 2020-01-20 RX ORDER — DEXTROSE 50 % IN WATER 50 %
15 SYRINGE (ML) INTRAVENOUS ONCE
Refills: 0 | Status: DISCONTINUED | OUTPATIENT
Start: 2020-01-20 | End: 2020-01-21

## 2020-01-20 RX ORDER — LEVOTHYROXINE SODIUM 125 MCG
150 TABLET ORAL DAILY
Refills: 0 | Status: DISCONTINUED | OUTPATIENT
Start: 2020-01-20 | End: 2020-01-21

## 2020-01-20 RX ORDER — ONDANSETRON 8 MG/1
4 TABLET, FILM COATED ORAL EVERY 6 HOURS
Refills: 0 | Status: DISCONTINUED | OUTPATIENT
Start: 2020-01-20 | End: 2020-01-21

## 2020-01-20 RX ORDER — DEXTROSE 50 % IN WATER 50 %
12.5 SYRINGE (ML) INTRAVENOUS ONCE
Refills: 0 | Status: DISCONTINUED | OUTPATIENT
Start: 2020-01-20 | End: 2020-01-21

## 2020-01-20 RX ORDER — GLUCAGON INJECTION, SOLUTION 0.5 MG/.1ML
1 INJECTION, SOLUTION SUBCUTANEOUS ONCE
Refills: 0 | Status: DISCONTINUED | OUTPATIENT
Start: 2020-01-20 | End: 2020-01-21

## 2020-01-20 RX ORDER — MIRABEGRON 50 MG/1
1 TABLET, EXTENDED RELEASE ORAL
Qty: 0 | Refills: 0 | DISCHARGE

## 2020-01-20 RX ORDER — DEXTROSE 50 % IN WATER 50 %
25 SYRINGE (ML) INTRAVENOUS ONCE
Refills: 0 | Status: DISCONTINUED | OUTPATIENT
Start: 2020-01-20 | End: 2020-01-21

## 2020-01-20 RX ORDER — SODIUM CHLORIDE 9 MG/ML
1000 INJECTION, SOLUTION INTRAVENOUS
Refills: 0 | Status: DISCONTINUED | OUTPATIENT
Start: 2020-01-20 | End: 2020-01-21

## 2020-01-20 RX ORDER — HEPARIN SODIUM 5000 [USP'U]/ML
5000 INJECTION INTRAVENOUS; SUBCUTANEOUS EVERY 8 HOURS
Refills: 0 | Status: DISCONTINUED | OUTPATIENT
Start: 2020-01-20 | End: 2020-01-21

## 2020-01-20 RX ORDER — ASPIRIN/CALCIUM CARB/MAGNESIUM 324 MG
325 TABLET ORAL DAILY
Refills: 0 | Status: DISCONTINUED | OUTPATIENT
Start: 2020-01-20 | End: 2020-01-21

## 2020-01-20 RX ORDER — ALIROCUMAB 75 MG/ML
0 INJECTION, SOLUTION SUBCUTANEOUS
Qty: 0 | Refills: 0 | DISCHARGE

## 2020-01-20 RX ORDER — INSULIN ASPART 100 [IU]/ML
0 INJECTION, SOLUTION SUBCUTANEOUS
Qty: 0 | Refills: 0 | DISCHARGE

## 2020-01-20 RX ORDER — UBIDECARENONE 100 MG
1 CAPSULE ORAL
Qty: 0 | Refills: 0 | DISCHARGE

## 2020-01-20 RX ORDER — AZITHROMYCIN 250 MG/1
250 TABLET, FILM COATED ORAL
Qty: 1 | Refills: 0 | Status: COMPLETED | COMMUNITY
Start: 2019-12-18 | End: 2020-01-20

## 2020-01-20 RX ORDER — INSULIN HUMAN 100 [IU]/ML
0 INJECTION, SOLUTION SUBCUTANEOUS
Qty: 0 | Refills: 0 | DISCHARGE

## 2020-01-20 RX ORDER — LORATADINE 10 MG/1
10 TABLET ORAL DAILY
Refills: 0 | Status: DISCONTINUED | OUTPATIENT
Start: 2020-01-20 | End: 2020-01-21

## 2020-01-20 RX ORDER — INSULIN LISPRO 100/ML
VIAL (ML) SUBCUTANEOUS
Refills: 0 | Status: DISCONTINUED | OUTPATIENT
Start: 2020-01-20 | End: 2020-01-21

## 2020-01-20 RX ORDER — LOSARTAN/HYDROCHLOROTHIAZIDE 100MG-25MG
1 TABLET ORAL
Qty: 0 | Refills: 0 | DISCHARGE

## 2020-01-20 RX ORDER — LEVOCETIRIZINE DIHYDROCHLORIDE 0.5 MG/ML
1 SOLUTION ORAL
Qty: 0 | Refills: 0 | DISCHARGE

## 2020-01-20 RX ADMIN — Medication 325 MILLIGRAM(S): at 14:07

## 2020-01-20 RX ADMIN — HEPARIN SODIUM 5000 UNIT(S): 5000 INJECTION INTRAVENOUS; SUBCUTANEOUS at 21:35

## 2020-01-20 NOTE — ASSESSMENT
[FreeTextEntry1] : Vertigo: Vertigo is persistent rather than short-lived over minutes. Favors left when walking. Concern for cerebellar CVA. Sent patient to [facility] and discussed with triage nurse. \par

## 2020-01-20 NOTE — PHYSICAL EXAM
[No Acute Distress] : no acute distress [Normal Sclera/Conjunctiva] : normal sclera/conjunctiva [EOMI] : extraocular movements intact [No Spinal Tenderness] : no spinal tenderness [PERRL] : pupils equal round and reactive to light [Grossly Normal Strength/Tone] : grossly normal strength/tone [Normal Affect] : the affect was normal [No Joint Swelling] : no joint swelling [Normal Insight/Judgement] : insight and judgment were intact [de-identified] : c [de-identified] : favors left when walking, coordination worse with finger to nose with left hand

## 2020-01-20 NOTE — PATIENT PROFILE ADULT - NSPROGENOTHERPROVIDER_GEN_A_NUR
DR TAINA Marina [phone] primary DR  DR Kate Harris [phone] endocrinologist  DR Evens Jordan cardio  Dr Murrell neurology([place])/medical specialist

## 2020-01-20 NOTE — ED ADULT NURSE NOTE - NSIMPLEMENTINTERV_GEN_ALL_ED
Implemented All Universal Safety Interventions:  [place] to call system. Call bell, personal items and telephone within reach. Instruct patient to call for assistance. Room bathroom lighting operational. Non-slip footwear when patient is off stretcher. Physically safe environment: no spills, clutter or unnecessary equipment. Stretcher in lowest position, wheels locked, appropriate side rails in place.

## 2020-01-20 NOTE — ED ADULT NURSE NOTE - OBJECTIVE STATEMENT
pt was sent by MD office for r/o cerebellar infarct due to HTN and vertigo-like symptoms -pt states she awoke early this am and felt as though her equilibrium was off-pt states he is feeling better now but still feels "off".

## 2020-01-20 NOTE — H&P ADULT - HISTORY OF PRESENT ILLNESS
Pt is a 70 yo F sent in from her PMD office due to       pt is a 70 yo f who has hx of dm, elev chol graves dz, sp tonsils and hand surgery never smoker not a drinker bib sig other for eval from pmd's office for dizziness and unsteady gait upon awakening this am.  she went to bed well at approx 11:45 pm and awoke at 3 am to go to restroom. she was unsteady and remained so.  This am she went to see dr Marina but she was not in so she saw her partner dr Alvarez who sent pt for possible stroke.  	pt sx are slightly better but minimally so Pt is a 70 yo F sent in from her PMD office due to unsteady gait to be evaluated for CVA. PMH DM1 on insulin pump, HTN, Hypothyroidism (had graves disease in the past), HLD.   She states that this morning when she awoke out of bed she felt unsteady on her feet, she could not keep her balance and was dizzy. She went to see her PMD and there was concern for stroke and so she waas sent to the ED.   In the ED patient continues to feel "wobbly" on her feet and said she has dysequilibrium. Denies any past hx of TIA/CVA/MI. She has no other focal deficitis, slurred speech, weakness, nausea, vomiting, chest pain, palpitations, SOB, abd pain, diarrhea, melena, hematochezia, dysuria.   In ED patient seen by neurology who recommended admission, CT head without acute stroke, CTA Head and neck unremarkable. MRI and TTE pending.

## 2020-01-20 NOTE — HISTORY OF PRESENT ILLNESS
[Initial Evaluation] : an initial evaluation of [Sensation of Movement] : sensation of movement [Loss of Balance] : loss of balance [Dizziness] : dizziness [Difficulty Ambulating] : difficulty ambulating [Currently Experiencing] : The patient is currently experiencing symptoms. [___ Hour(s) Ago] : [unfilled] hour(s) ago [Sudden] : sudden [None] : There is no known event that preceded symptom onset [___ Hour(s)] : last for [unfilled] hour(s) [Constant] : constantly [Daily] : occur daily [Unchanged] : unchanged [Moderate] : moderate in severity [Head Movement] : not exacerbated by head movement [Ear Fullness] : no ear fullness [Headache] : no headache [Fever] : no fever [Facial Weakness] : no facial weakness [Dysphagia] : no dysphagia [Dysarthria] : no dysarthria [de-identified] : t

## 2020-01-20 NOTE — ED PROVIDER NOTE - ENMT, MLM
Airway patent, Nasal mucosa clear. Mouth with normal mucosa.  Torres Martinez wears hearing aide left ear

## 2020-01-20 NOTE — ED PROVIDER NOTE - MDM ORDERS SUBMITTED SELECTION
[Dear  ___] : Dear  [unfilled], [Consult Closing:] : Thank you very much for allowing me to participate in the care of this patient.  If you have any questions, please do not hesitate to contact me. [Consult Letter:] : I had the pleasure of evaluating your patient, [unfilled]. [Sincerely,] : Sincerely, [FreeTextEntry3] : Darío Guzman MD\par  EKG/Labs/Imaging Studies

## 2020-01-20 NOTE — ED ADULT TRIAGE NOTE - CHIEF COMPLAINT QUOTE
Sent from MD [facility] for vertigo-states wants to r/o "cerebellar stroke". Patient hypertensive @221/68 in triage. Type 1 diabetic.

## 2020-01-20 NOTE — ED PROVIDER NOTE - CLINICAL SUMMARY MEDICAL DECISION MAKING FREE TEXT BOX
ro cva ro other causes for imbalance of gait on awakening at 3 am labs ua ct ekg  neurology consultation a  admission for stroke

## 2020-01-20 NOTE — H&P ADULT - NSHPPHYSICALEXAM_GEN_ALL_CORE
T(C): 36.7 (01-20-20 @ 10:41), Max: 36.7 (01-20-20 @ 10:41)  HR: 75 (01-20-20 @ 13:45) (63 - 75)  BP: 173/78 (01-20-20 @ 13:45) (167/67 - 221/68)  RR: 16 (01-20-20 @ 13:45) (16 - 18)  SpO2: 100% (01-20-20 @ 13:45) (98% - 100%)  Wt(kg): --    Physical Exam:   GENERAL: well-groomed, well-developed, NAD  HEENT: head NC/AT; EOM intact, PERRLA, conjunctiva & sclera clear; hearing grossly intact, moist mucous membranes  NECK: supple, no JVD  RESPIRATORY: CTA B/L, no wheezing, rales, rhonchi or rubs  CARDIOVASCULAR: S1&S2, RRR, no murmurs or gallops  ABDOMEN: soft, non-tender, non-distended, + Bowel sounds x4 quadrants, no guarding, rebound or rigidity  MUSCULOSKELETAL:  no clubbing, cyanosis or edema of all 4 extremities  LYMPH: no cervical lymphadenopathy  VASCULAR: Radial pulses 2+ bilaterally, no varicose veins   SKIN: warm and dry, color normal  NEUROLOGIC: AA&O X3, CN2-12 intact w/ no focal deficits, no sensory loss, motor Strength 5/5 in UE & LE B/L  Psych: Normal mood and affect, normal behavior

## 2020-01-20 NOTE — ED PROVIDER NOTE - OBJECTIVE STATEMENT
pt is a 70 yo f who has hx of dm, elev chol graves dz, sp tonsils and hand surgery never smoker not a drinker bib sig other for eval from pmd's office for dizziness and unsteady gait upon awakening this am.  she went to bed well at approx 11:45 pm and awoke at 3 am to go to restroom. she was unsteady and remained so.  This am she went to see dr Marina but she was not in so she saw her partner dr Alvarez who sent pt for possible stroke.  pt sx are slightly better but minimally so  no other sx

## 2020-01-20 NOTE — ED PROVIDER NOTE - PROGRESS NOTE DETAILS
dw dr houston will see pt seen by neurology to be admitted for stroke work up as she is unsteady  dw dr guerra admittiong physician accepts to his service seen by neurology to be admitted for stroke work up as she is unsteady  dw dr guerra admitting physician accepts to his service

## 2020-01-20 NOTE — REVIEW OF SYSTEMS
[Dizziness] : dizziness [Unsteady Walking] : ataxia [Fever] : no fever [Chills] : no chills [Lower Ext Edema] : no lower extremity edema [Chest Pain] : no chest pain [Palpitations] : no palpitations [Night Sweats] : no night sweats [Wheezing] : no wheezing [Cough] : no cough [Shortness Of Breath] : no shortness of breath [Abdominal Pain] : no abdominal pain [Nausea] : no nausea [Dyspnea on Exertion] : no dyspnea on exertion [Diarrhea] : diarrhea [Vomiting] : no vomiting [Constipation] : no constipation [Dysuria] : no dysuria [Muscle Weakness] : no muscle weakness [Muscle Pain] : no muscle pain [Mole Changes] : no mole changes [Headache] : no headache

## 2020-01-20 NOTE — PATIENT PROFILE ADULT - ABILITY TO HEAR (WITH HEARING AID OR HEARING APPLIANCE IF NORMALLY USED):
Mildly to Moderately Impaired: difficulty hearing in some environments or speaker may need to increase volume or speak distinctly/bilateral hearing aids with pt

## 2020-01-20 NOTE — H&P ADULT - ASSESSMENT
Pt is a 68 yo F sent in from her PMD office due to unsteady gait to be evaluated for CVA. PMH DM1 on insulin pump, HTN, Hypothyroidism (had graves disease in the past), HLD.     Unsteady gait: eval for CVA.   -will need MRI to eval further.   -stroke pathway  -neurochecks, permissive HTN, TTE, A1c, lipids  -patient on injectable cholesterol reducing medicine so will not order statin.   -continue ASA.   -neuro consult appreciated.     DM1: Diabetic diet, continue insulin pump.   -endo consult for DM mgmt    HTN: permissive HTN  -hold anti-htn meds.    Hypothyroidism: continue synthroid    HLD: see above.     DVT ppx: heparin  IMPROVE VTE Individual Risk Assessment    RISK                                                                Points    [  ] Previous VTE                                                  3    [  ] Thrombophilia                                               2    [  ] Lower limb paralysis                                      2        (unable to hold up >15 seconds)      [  ] Current Cancer                                              2         (within 6 months)    [  ] Immobilization > 24 hrs                                1    [  ] ICU/CCU stay > 24 hours                              1    [ X ] Age > 60                                                      1    IMPROVE VTE Score _1________    IMPROVE Score 0-1: Low Risk, No VTE prophylaxis required for most patients, encourage ambulation.   IMPROVE Score 2-3: At risk, pharmacologic VTE prophylaxis is indicated for most patients (in the absence of a contraindication)  IMPROVE Score > or = 4: High Risk, pharmacologic VTE prophylaxis is indicated for most patients (in the absence of a contraindication) Pt is a 68 yo F sent in from her PMD office due to unsteady gait to be evaluated for CVA. PMH DM1 on insulin pump, HTN, Hypothyroidism (had graves disease in the past), HLD.     Unsteady gait: eval for CVA.   -will need MRI to eval further.   -stroke pathway  -neurochecks, permissive HTN, TTE, A1c, lipids  -patient on injectable cholesterol reducing medicine so will not order statin.   -continue ASA.   -neuro consult appreciated.     DM1: Diabetic diet, continue insulin pump.   -Diabetes specialist consult for insulin pump mgmt    HTN: permissive HTN  -hold anti-htn meds.    Hypothyroidism: continue synthroid    HLD: see above.     DVT ppx: heparin  IMPROVE VTE Individual Risk Assessment    RISK                                                                Points    [  ] Previous VTE                                                  3    [  ] Thrombophilia                                               2    [  ] Lower limb paralysis                                      2        (unable to hold up >15 seconds)      [  ] Current Cancer                                              2         (within 6 months)    [  ] Immobilization > 24 hrs                                1    [  ] ICU/CCU stay > 24 hours                              1    [ X ] Age > 60                                                      1    IMPROVE VTE Score _1________    IMPROVE Score 0-1: Low Risk, No VTE prophylaxis required for most patients, encourage ambulation.   IMPROVE Score 2-3: At risk, pharmacologic VTE prophylaxis is indicated for most patients (in the absence of a contraindication)  IMPROVE Score > or = 4: High Risk, pharmacologic VTE prophylaxis is indicated for most patients (in the absence of a contraindication)

## 2020-01-20 NOTE — ED PROVIDER NOTE - CHPI ED SYMPTOMS NEG
no loss of consciousness/no nausea/no numbness/no confusion/no change in level of consciousness/no weakness/no blurred vision/no vomiting/no fever

## 2020-01-21 ENCOUNTER — TRANSCRIPTION ENCOUNTER (OUTPATIENT)
Age: 70
End: 2020-01-21

## 2020-01-21 VITALS
SYSTOLIC BLOOD PRESSURE: 135 MMHG | OXYGEN SATURATION: 100 % | TEMPERATURE: 98 F | RESPIRATION RATE: 16 BRPM | HEART RATE: 74 BPM | DIASTOLIC BLOOD PRESSURE: 68 MMHG

## 2020-01-21 DIAGNOSIS — E78.5 HYPERLIPIDEMIA, UNSPECIFIED: ICD-10-CM

## 2020-01-21 DIAGNOSIS — I10 ESSENTIAL (PRIMARY) HYPERTENSION: ICD-10-CM

## 2020-01-21 DIAGNOSIS — E10.65 TYPE 1 DIABETES MELLITUS WITH HYPERGLYCEMIA: ICD-10-CM

## 2020-01-21 DIAGNOSIS — E11.9 TYPE 2 DIABETES MELLITUS WITHOUT COMPLICATIONS: ICD-10-CM

## 2020-01-21 DIAGNOSIS — Z29.9 ENCOUNTER FOR PROPHYLACTIC MEASURES, UNSPECIFIED: ICD-10-CM

## 2020-01-21 DIAGNOSIS — E03.9 HYPOTHYROIDISM, UNSPECIFIED: ICD-10-CM

## 2020-01-21 DIAGNOSIS — G45.9 TRANSIENT CEREBRAL ISCHEMIC ATTACK, UNSPECIFIED: ICD-10-CM

## 2020-01-21 LAB
ANION GAP SERPL CALC-SCNC: 4 MMOL/L — LOW (ref 5–17)
BASOPHILS # BLD AUTO: 0.04 K/UL — SIGNIFICANT CHANGE UP (ref 0–0.2)
BASOPHILS NFR BLD AUTO: 0.8 % — SIGNIFICANT CHANGE UP (ref 0–2)
BUN SERPL-MCNC: 14 MG/DL — SIGNIFICANT CHANGE UP (ref 7–23)
CALCIUM SERPL-MCNC: 8.2 MG/DL — LOW (ref 8.5–10.1)
CHLORIDE SERPL-SCNC: 107 MMOL/L — SIGNIFICANT CHANGE UP (ref 96–108)
CHOLEST SERPL-MCNC: 133 MG/DL — SIGNIFICANT CHANGE UP (ref 10–199)
CO2 SERPL-SCNC: 31 MMOL/L — SIGNIFICANT CHANGE UP (ref 22–31)
CREAT SERPL-MCNC: 0.65 MG/DL — SIGNIFICANT CHANGE UP (ref 0.5–1.3)
EOSINOPHIL # BLD AUTO: 0.36 K/UL — SIGNIFICANT CHANGE UP (ref 0–0.5)
EOSINOPHIL NFR BLD AUTO: 6.9 % — HIGH (ref 0–6)
GLUCOSE SERPL-MCNC: 158 MG/DL — HIGH (ref 70–99)
HBA1C BLD-MCNC: 7.7 % — HIGH (ref 4–5.6)
HCT VFR BLD CALC: 34 % — LOW (ref 34.5–45)
HCV AB S/CO SERPL IA: 0.14 S/CO — SIGNIFICANT CHANGE UP (ref 0–0.99)
HCV AB SERPL-IMP: SIGNIFICANT CHANGE UP
HDLC SERPL-MCNC: 73 MG/DL — SIGNIFICANT CHANGE UP
HGB BLD-MCNC: 11.7 G/DL — SIGNIFICANT CHANGE UP (ref 11.5–15.5)
IMM GRANULOCYTES NFR BLD AUTO: 0 % — SIGNIFICANT CHANGE UP (ref 0–1.5)
LIPID PNL WITH DIRECT LDL SERPL: 47 MG/DL — SIGNIFICANT CHANGE UP
LYMPHOCYTES # BLD AUTO: 2.11 K/UL — SIGNIFICANT CHANGE UP (ref 1–3.3)
LYMPHOCYTES # BLD AUTO: 40.4 % — SIGNIFICANT CHANGE UP (ref 13–44)
MCHC RBC-ENTMCNC: 31 PG — SIGNIFICANT CHANGE UP (ref 27–34)
MCHC RBC-ENTMCNC: 34.4 GM/DL — SIGNIFICANT CHANGE UP (ref 32–36)
MCV RBC AUTO: 90.2 FL — SIGNIFICANT CHANGE UP (ref 80–100)
MONOCYTES # BLD AUTO: 0.66 K/UL — SIGNIFICANT CHANGE UP (ref 0–0.9)
MONOCYTES NFR BLD AUTO: 12.6 % — SIGNIFICANT CHANGE UP (ref 2–14)
NEUTROPHILS # BLD AUTO: 2.05 K/UL — SIGNIFICANT CHANGE UP (ref 1.8–7.4)
NEUTROPHILS NFR BLD AUTO: 39.3 % — LOW (ref 43–77)
NRBC # BLD: 0 /100 WBCS — SIGNIFICANT CHANGE UP (ref 0–0)
PLATELET # BLD AUTO: 210 K/UL — SIGNIFICANT CHANGE UP (ref 150–400)
POTASSIUM SERPL-MCNC: 3.7 MMOL/L — SIGNIFICANT CHANGE UP (ref 3.5–5.3)
POTASSIUM SERPL-SCNC: 3.7 MMOL/L — SIGNIFICANT CHANGE UP (ref 3.5–5.3)
RBC # BLD: 3.77 M/UL — LOW (ref 3.8–5.2)
RBC # FLD: 13.3 % — SIGNIFICANT CHANGE UP (ref 10.3–14.5)
SODIUM SERPL-SCNC: 142 MMOL/L — SIGNIFICANT CHANGE UP (ref 135–145)
TOTAL CHOLESTEROL/HDL RATIO MEASUREMENT: 1.8 RATIO — LOW (ref 3.3–7.1)
TRIGL SERPL-MCNC: 70 MG/DL — SIGNIFICANT CHANGE UP (ref 10–149)
TSH SERPL-MCNC: 0.04 UIU/ML — LOW (ref 0.36–3.74)
WBC # BLD: 5.22 K/UL — SIGNIFICANT CHANGE UP (ref 3.8–10.5)
WBC # FLD AUTO: 5.22 K/UL — SIGNIFICANT CHANGE UP (ref 3.8–10.5)

## 2020-01-21 PROCEDURE — C8929: CPT

## 2020-01-21 PROCEDURE — 97161 PT EVAL LOW COMPLEX 20 MIN: CPT

## 2020-01-21 PROCEDURE — 70450 CT HEAD/BRAIN W/O DYE: CPT

## 2020-01-21 PROCEDURE — 85730 THROMBOPLASTIN TIME PARTIAL: CPT

## 2020-01-21 PROCEDURE — 99239 HOSP IP/OBS DSCHRG MGMT >30: CPT

## 2020-01-21 PROCEDURE — 70496 CT ANGIOGRAPHY HEAD: CPT

## 2020-01-21 PROCEDURE — 82962 GLUCOSE BLOOD TEST: CPT

## 2020-01-21 PROCEDURE — 80053 COMPREHEN METABOLIC PANEL: CPT

## 2020-01-21 PROCEDURE — 70551 MRI BRAIN STEM W/O DYE: CPT | Mod: 26

## 2020-01-21 PROCEDURE — 85027 COMPLETE CBC AUTOMATED: CPT

## 2020-01-21 PROCEDURE — 93306 TTE W/DOPPLER COMPLETE: CPT

## 2020-01-21 PROCEDURE — 80061 LIPID PANEL: CPT

## 2020-01-21 PROCEDURE — 70551 MRI BRAIN STEM W/O DYE: CPT

## 2020-01-21 PROCEDURE — 83036 HEMOGLOBIN GLYCOSYLATED A1C: CPT

## 2020-01-21 PROCEDURE — 36415 COLL VENOUS BLD VENIPUNCTURE: CPT

## 2020-01-21 PROCEDURE — 84443 ASSAY THYROID STIM HORMONE: CPT

## 2020-01-21 PROCEDURE — 97165 OT EVAL LOW COMPLEX 30 MIN: CPT

## 2020-01-21 PROCEDURE — 70498 CT ANGIOGRAPHY NECK: CPT

## 2020-01-21 PROCEDURE — 80048 BASIC METABOLIC PNL TOTAL CA: CPT

## 2020-01-21 PROCEDURE — 84484 ASSAY OF TROPONIN QUANT: CPT

## 2020-01-21 PROCEDURE — 93005 ELECTROCARDIOGRAM TRACING: CPT

## 2020-01-21 PROCEDURE — 99285 EMERGENCY DEPT VISIT HI MDM: CPT | Mod: 25

## 2020-01-21 PROCEDURE — 86803 HEPATITIS C AB TEST: CPT

## 2020-01-21 PROCEDURE — 85610 PROTHROMBIN TIME: CPT

## 2020-01-21 RX ORDER — ASPIRIN/CALCIUM CARB/MAGNESIUM 324 MG
1 TABLET ORAL
Qty: 13 | Refills: 0
Start: 2020-01-21 | End: 2020-02-02

## 2020-01-21 RX ORDER — ASPIRIN/CALCIUM CARB/MAGNESIUM 324 MG
1 TABLET ORAL
Qty: 30 | Refills: 0
Start: 2020-01-21 | End: 2020-02-19

## 2020-01-21 RX ORDER — ASPIRIN/CALCIUM CARB/MAGNESIUM 324 MG
1 TABLET ORAL
Qty: 0 | Refills: 0 | DISCHARGE
Start: 2020-01-21

## 2020-01-21 RX ORDER — LEVOTHYROXINE SODIUM 125 MCG
1 TABLET ORAL
Qty: 0 | Refills: 0 | DISCHARGE

## 2020-01-21 RX ORDER — LEVOTHYROXINE SODIUM 125 MCG
1 TABLET ORAL
Qty: 30 | Refills: 0
Start: 2020-01-21 | End: 2020-02-19

## 2020-01-21 RX ADMIN — HEPARIN SODIUM 5000 UNIT(S): 5000 INJECTION INTRAVENOUS; SUBCUTANEOUS at 05:25

## 2020-01-21 RX ADMIN — Medication 325 MILLIGRAM(S): at 11:59

## 2020-01-21 RX ADMIN — Medication 1 TABLET(S): at 11:59

## 2020-01-21 RX ADMIN — Medication 150 MICROGRAM(S): at 05:25

## 2020-01-21 RX ADMIN — LORATADINE 10 MILLIGRAM(S): 10 TABLET ORAL at 11:59

## 2020-01-21 NOTE — DISCHARGE NOTE PROVIDER - CARE PROVIDER_API CALL
Cathleen Marina (DO)  Internal Medicine  [address] [place] [address]  Phone: [phone]  Fax: [phone]  Follow Up Time:

## 2020-01-21 NOTE — DISCHARGE NOTE PROVIDER - NSDCFUSCHEDAPPT_GEN_ALL_CORE_FT
ARIS CENTENO ; 01/30/2020 ; [facility] [address] ARIS Motta ; 03/10/2020 ; [facility] [address] ARIS CENTENO ; 01/30/2020 ; [facility] [address] ARIS Motta ; 03/10/2020 ; [facility] [address]

## 2020-01-21 NOTE — CONSULT NOTE ADULT - PROBLEM SELECTOR RECOMMENDATION 9
low tsh; may require reduction in dosage of lt4 to 137mcg/day  check t4, t3, repeat tsh to assess for dynamic changes

## 2020-01-21 NOTE — PROGRESS NOTE ADULT - SUBJECTIVE AND OBJECTIVE BOX
Neurology follow up note    ARIS [id number]      Interval History:    Patient feels ok no new complaints.    MEDICATIONS    aspirin 325 milliGRAM(s) Oral daily  calcium carbonate 1250 mG  + Vitamin D (OsCal 500 + D) 1 Tablet(s) Oral daily  dextrose 40% Gel 15 Gram(s) Oral once PRN  dextrose 5%. 1000 milliLiter(s) IV Continuous <Continuous>  dextrose 50% Injectable 12.5 Gram(s) IV Push once  dextrose 50% Injectable 25 Gram(s) IV Push once  dextrose 50% Injectable 25 Gram(s) IV Push once  glucagon  Injectable 1 milliGRAM(s) IntraMuscular once PRN  heparin  Injectable 5000 Unit(s) SubCutaneous every 8 hours  insulin lispro (HumaLOG) corrective regimen sliding scale   SubCutaneous three times a day before meals  levothyroxine 150 MICROGram(s) Oral daily  loratadine 10 milliGRAM(s) Oral daily  ondansetron Injectable 4 milliGRAM(s) IV Push every 6 hours PRN      Allergies    No Known Allergies    Intolerances            Vital Signs Last 24 Hrs  T(C): 36.8 (21 Jan 2020 12:35), Max: 36.8 (21 Jan 2020 08:01)  T(F): 98.3 (21 Jan 2020 12:35), Max: 98.3 (21 Jan 2020 12:35)  HR: 74 (21 Jan 2020 12:35) (60 - 79)  BP: 135/68 (21 Jan 2020 12:35) (131/66 - 185/65)  BP(mean): --  RR: 16 (21 Jan 2020 12:35) (16 - 18)  SpO2: 100% (21 Jan 2020 12:35) (97% - 100%)    REVIEW OF SYSTEMS:  Constitutional:  No fever, chills, or night sweats.  Head:  No headache.  No double vision or blurry vision.  Ears:  No ringing in the ears.  Neck:  No neck pain.  Respiratory:  No shortness of breath.  Cardiovascular:  No chest pain.  Abdomen:  No nausea, vomiting, or abdominal pain.  Extremities/Neurological:  No numbness or tingling.  Musculoskeletal:  No joint pain.  General:  Positive episode of feeling steady when she is walking on her feet.    PHYSICAL EXAMINATION:    HEENT:  Head:  Normocephalic, atraumatic.  Eyes:  No scleral icterus.  Ears:  Hearing bilaterally intact.  NECK:  Supple.  RESPIRATORY:  Good air entry bilaterally.  CARDIOVASCULAR:  S1 and S2 heard.  ABDOMEN:  Soft and nontender.  EXTREMITIES:  No clubbing or cyanosis were noted.  NEUROLOGIC:  The patient is awake and alert.  Extraocular movements were intact.  Pupils were equal, round, and reactive bilaterally 3 mm to 2 mm.  Speech was fluent.  Smile was symmetric.      Motor:  Bilateral upper and lower were 5/5.  Sensory:  Bilateral upper and lower intact to light touch.  Finger-to-nose, fine finger movement.  Rapid alternating within normal limits.  Gait:  no ataxia               LABS:  CBC Full  -  ( 21 Jan 2020 06:31 )  WBC Count : 5.22 K/uL  RBC Count : 3.77 M/uL  Hemoglobin : 11.7 g/dL  Hematocrit : 34.0 %  Platelet Count - Automated : 210 K/uL  Mean Cell Volume : 90.2 fl  Mean Cell Hemoglobin : 31.0 pg  Mean Cell Hemoglobin Concentration : 34.4 gm/dL  Auto Neutrophil # : 2.05 K/uL  Auto Lymphocyte # : 2.11 K/uL  Auto Monocyte # : 0.66 K/uL  Auto Eosinophil # : 0.36 K/uL  Auto Basophil # : 0.04 K/uL  Auto Neutrophil % : 39.3 %  Auto Lymphocyte % : 40.4 %  Auto Monocyte % : 12.6 %  Auto Eosinophil % : 6.9 %  Auto Basophil % : 0.8 %      01-21    142  |  107  |  14  ----------------------------<  158<H>  3.7   |  31  |  0.65    Ca    8.2<L>      21 Jan 2020 06:31    TPro  7.3  /  Alb  3.6  /  TBili  0.3  /  DBili  x   /  AST  20  /  ALT  26  /  AlkPhos  125<H>  01-20    Hemoglobin A1C: Hemoglobin A1C, Whole Blood: 7.7 % (01-21 @ 08:28)  Hemoglobin A1C, Whole Blood: 7.8 % (01-20 @ 18:51)    Lipid Panel 01-21 @ 08:37  Total Cholesterol, Serum 133  LDL 47  Triglycerides 70    LIVER FUNCTIONS - ( 20 Jan 2020 11:32 )  Alb: 3.6 g/dL / Pro: 7.3 g/dL / ALK PHOS: 125 U/L / ALT: 26 U/L / AST: 20 U/L / GGT: x           Vitamin B12   PT/INR - ( 20 Jan 2020 11:32 )   PT: 11.1 sec;   INR: 0.97 ratio         PTT - ( 20 Jan 2020 11:32 )  PTT:32.5 sec      RADIOLOGY  ANALYSIS AND PLAN:  A 69-year-old with episodes of ataxia.  1.	For episode of ataxia, questionable this could be development of peripheral neuropathy,  vs TIA   2.	MRI imaging of the brain was negative for CVA   3.	Will start the patient on aspirin 325 once a day for 2 weeks than 81 mg  4.	For history of high cholesterol, continue the patient on her home medications.  5.	For history of diabetes, strict control of blood pressure.  6.	I would recommend echocardiogram.  7.	Physical Therapy evaluation.  8.	Fall precaution.  9.	Spoke with the patient's boyfriend at the bedside, they understand the reasoning and thought process.  10.	ok for discharge if echo ok   11.	Greater than 45 minutes of time was spent with the patient, plan of care, reviewed data, speaking to the multidisciplinary healthcare team.    Thank you for the courtesy of this consultation.

## 2020-01-21 NOTE — PHYSICAL THERAPY INITIAL EVALUATION ADULT - PATIENT PROFILE REVIEW, REHAB EVAL
yes/Pt admitted due to feeling of unsteadiness & dizziness. Pt saw her PCP, whom as per patient, performed "informal stroke assessment" and referred her to ER to r/o CVA/TIA.

## 2020-01-21 NOTE — PROGRESS NOTE ADULT - PROBLEM SELECTOR PLAN 1
Pt presented w unsteady gait x1 day  CT brain on admission unremarkable for acute intracranial pathology  MRI brain unremarkable for ischemia or infarct   CTA unremarkable for significant carotid stenosis.  Lipid panel unremarkable, TSH 0.04, A1c 7.7  Continue neurochecks Q4  -patient on injectable cholesterol reducing medicine so will not order statin.   -continue ASA.   -neuro consulted  -Speech and swallow study ordered

## 2020-01-21 NOTE — PHYSICAL THERAPY INITIAL EVALUATION ADULT - ASR EQUIP NEEDS DISCH PT EVAL
None, pt is WFL & reports + demonstrates independence /c ambulating & transfers. No DME anticipated.

## 2020-01-21 NOTE — OCCUPATIONAL THERAPY INITIAL EVALUATION ADULT - RANGE OF MOTION EXAMINATION, UPPER EXTREMITY
bilateral UE Active ROM was WFL  (within functional limits)/Pt is right hand dominant. [facility] BUE WFL opposition x5 digits. [facility] BUE finger to/from nose WFL. Sensation grossly intact to light touch. Pt reports h/o CTS s/p repair BUE.

## 2020-01-21 NOTE — OCCUPATIONAL THERAPY INITIAL EVALUATION ADULT - ADDITIONAL COMMENTS
Pt reported she lives in a split-level home with her son. Bathroom has a stall shower. PTA pt was independent with ADLs/IADLs and functional mobility without AD; (+) ; (+) works part time.

## 2020-01-21 NOTE — DISCHARGE NOTE NURSING/CASE MANAGEMENT/SOCIAL WORK - NSDCPEPTSTRK_GEN_ALL_CORE
Risk factors for stroke/Call 911 for stroke/Need for follow up after discharge/Stroke education booklet/Stroke support groups for patients, families, and friends/Stroke warning signs and symptoms/Prescribed medications/Signs and symptoms of stroke

## 2020-01-21 NOTE — PROGRESS NOTE ADULT - SUBJECTIVE AND OBJECTIVE BOX
Patient is a 69y old  Female who presents with a chief complaint of Unsteady Gait (21 Jan 2020 10:05)    OVERNIGHT EVENTS: Pt seen and examined at bedside. No acute events overnight. Pt seen ambulating well in room. No complaints. Denies CP, SOB, NVD, headache, dizziness, lightheadedness    Home Medications:  aspirin 81 mg oral tablet: 1 tab(s) orally once a day (21 Jan 2020 10:29)  Calcium 600+D 600 mg-200 intl units (5 mcg) oral tablet: 1 tab(s) orally 2 times a day (20 Jan 2020 19:19)  CoQ10 300 mg oral capsule: 1 cap(s) orally once a day (20 Jan 2020 19:19)  doxycycline 20 mg oral capsule: 1 cap(s) orally once a day (20 Jan 2020 19:19)  insulin pump:  (20 Jan 2020 19:19)  losartan-hydrochlorothiazide 100mg-12.5mg oral tablet: 1 tab(s) orally once a day (20 Jan 2020 19:19)  Myrbetriq 50 mg oral tablet, extended release: 1 tab(s) orally once a day (20 Jan 2020 19:19)  NovoLOG 100 units/mL injectable solution:  (20 Jan 2020 19:19)  Praluent Pen 75 mg/mL subcutaneous solution:  (20 Jan 2020 19:19)  Xyzal 5 mg oral tablet: 1 tab(s) orally once a day (in the evening) (20 Jan 2020 19:19)      MEDICATIONS  (STANDING):  aspirin 325 milliGRAM(s) Oral daily  calcium carbonate 1250 mG  + Vitamin D (OsCal 500 + D) 1 Tablet(s) Oral daily  dextrose 5%. 1000 milliLiter(s) (50 mL/Hr) IV Continuous <Continuous>  dextrose 50% Injectable 12.5 Gram(s) IV Push once  dextrose 50% Injectable 25 Gram(s) IV Push once  dextrose 50% Injectable 25 Gram(s) IV Push once  heparin  Injectable 5000 Unit(s) SubCutaneous every 8 hours  insulin lispro (HumaLOG) corrective regimen sliding scale   SubCutaneous three times a day before meals  levothyroxine 150 MICROGram(s) Oral daily  loratadine 10 milliGRAM(s) Oral daily    MEDICATIONS  (PRN):  dextrose 40% Gel 15 Gram(s) Oral once PRN Blood Glucose LESS THAN 70 milliGRAM(s)/deciliter  glucagon  Injectable 1 milliGRAM(s) IntraMuscular once PRN Glucose LESS THAN 70 milligrams/deciliter  ondansetron Injectable 4 milliGRAM(s) IV Push every 6 hours PRN Nausea      Allergies    No Known Allergies    Intolerances    CONSTITUTIONAL: denies fever, chills, fatigue, weakness  HEENT: denies blurred vision, sore throat  SKIN: denies new lesions, rash  CARDIOVASCULAR: denies chest pain, chest pressure, palpitations  RESPIRATORY: denies shortness of breath, sputum production  GASTROINTESTINAL: denies nausea, vomiting, diarrhea, abdominal pain  GENITOURINARY: denies dysuria, discharge  NEUROLOGICAL: admits ataxia, denies numbness, headache, focal weakness  MUSCULOSKELETAL: denies new joint pain, muscle aches  HEMATOLOGIC: denies gross bleeding, bruising  LYMPHATICS: denies enlarged lymph nodes, extremity swelling  PSYCHIATRIC: denies recent changes in anxiety, depression  ENDOCRINOLOGIC: denies sweating, cold or heat intolerance      01-20 @ 07:01  -  01-21 @ 07:00  --------------------------------------------------------  IN: 200 mL / OUT: 0 mL / NET: 200 mL    T(C): 36.8 (01-21-20 @ 08:01), Max: 36.8 (01-21-20 @ 08:01)  HR: 79 (01-21-20 @ 08:01) (60 - 79)  BP: 131/66 (01-21-20 @ 08:01) (131/66 - 185/65)  RR: 17 (01-21-20 @ 08:01) (16 - 18)  SpO2: 97% (01-21-20 @ 08:01) (97% - 100%)    GENERAL: patient appears well, no acute distress, appropriate  EYES: sclera clear, no exudates, PERRLA  ENMT: oropharynx clear without erythema, no exudates, moist mucous membranes  NECK: supple, soft, no thyromegaly noted  LUNGS:  clear to auscultation,  no rales, wheezing or rhonchi appreciated  HEART: S1/S2, regular rate and rhythm, no murmurs noted, no lower extremity edema, pulses  GASTROINTESTINAL: abdomen is soft, nontender, nondistended, normoactive bowel sounds, no palpable masses  INTEGUMENT: good skin turgor, warm, dry and intact  MUSCULOSKELETAL: no clubbing or cyanosis, no obvious deformity  NEUROLOGIC: awake, alert, oriented x3, strength 5/5 in all extremities, no obvious sensory deficits, ambulating well and without difficulty in room  PSYCHIATRIC: mood is good, affect is congruent, linear and logical thought process  HEME/LYMPH:  no obvious ecchymosis or petechiae     LABS:                        11.7   5.22  )-----------( 210      ( 21 Jan 2020 06:31 )             34.0     21 Jan 2020 06:31    142    |  107    |  14     ----------------------------<  158    3.7     |  31     |  0.65     Ca    8.2        21 Jan 2020 06:31      PT/INR - ( 20 Jan 2020 11:32 )   PT: 11.1 sec;   INR: 0.97 ratio         PTT - ( 20 Jan 2020 11:32 )  PTT:32.5 sec        CARDIAC MARKERS ( 20 Jan 2020 11:32 )  <.015 ng/mL / x     / x     / x     / x          Anemia Panel:      Thyroid Panel:  Thyroid Stimulating Hormone, Serum: 0.04 uIU/mL (01-21-20 @ 06:31)      RADIOLOGY & ADDITIONAL TESTS:  < from: MR Head No Cont (01.21.20 @ 09:17) >    EXAM:  MR BRAIN                            PROCEDURE DATE:  01/21/2020          INTERPRETATION:  INDICATION:    Ataxia  TECHNIQUE:  Multiplanar brain imaging was conducted using a 1.5 Sridevi magnet.  T1, T2 and FLAIR imaging was performed.  In addition, diffusion imaging, diffusion coefficient assessment (ADC) and echo planar T2* was incorporated. No contrast administered    COMPARISON EXAMINATION:  CT 1/20/2020    FINDINGS:  HEMISPHERES:  No diffusion restriction or acute ischemia is noted. There are mild involutional changes and mild volume loss, commensurate with age. No acute abnormality or space-occupying lesion is noted. No advanced chronic ischemic changes are suggested.  VENTRICLES:  midline and normal in size  POSTERIOR FOSSA:  The brain stem and cerebellum are unremarkable in appearance.  No CP angle abnormality is noted.  EXTRA-AXIAL:  No mass or collections are depicted.  CRANIAL NERVES:  No abnormality noted.  VASCULATURE:  The major vessels and venous sinuses are grossly patent.    SINUSES AND MASTOIDS:  Clear.  MISCELLANEOUS:  No orbital or pituitary abnormality noted.  No skull base lesion suggested.    IMPRESSION:      1)  mild volume loss and involutional changes are noted, commensurate with age. No acute abnormality or space-occupying lesion is noted..  2)  no advanced chronic ischemic changes or severe atrophy noted..     < end of copied text >      HEALTH ISSUES - PROBLEM Dx:  Diabetes mellitus type 1, uncontrolled: Diabetes mellitus type 1, uncontrolled  Hypothyroidism: Hypothyroidism          < from: CT Angio Neck w/ IV Cont (01.20.20 @ 12:23) >  EXAM:  CT ANGIO NECK (W)AW IC                          EXAM:  CT ANGIO BRAIN (W)AW IC                            PROCEDURE DATE:  01/20/2020          INTERPRETATION:  Exam Date: 1/20/2020 12:22 PM    1. CT Angiography of the carotid arteries with and without IV contrast   2. CT Angiography of the intracranial circulation with and without IV contrast      CLINICAL INFORMATION:  acute vertigo unsteadygait htn ro ich ro cva    TECHNIQUE:   CT angiography images were acquired from the aortic arch to the vertex of the skull.   Images were acquired during rapid bolus intravenous administration of 90 mL of Omnipaque 350 contrast with 10 mL discarded.  This data set was reconstructed axial 1 mm sections. Post processing angiographic 3D MIP reconstruction of images was performed. This data set was  reconstructed  and reviewed in multiple projections to evaluate carotid morphology and intracranial vessels.  This scan was performed using automatic exposure control (radiation dose reduction software) to obtain a diagnostic image quality scan with patient dose as low as reasonably achievable.    FINDINGS:   No previous examinations are available for review.    The carotid circulation is intact without hemodynamically significant stenosis.   The vertebral arteries are patent.    Intracranial vertebral arteries are intact without evidence of dissection or hemodynamically significant stenosis. The basilar artery and posterior cerebral arteries and are normal without hemodynamically significant stenosis. Bilateral superior cerebellar arteries are intact. The intracranial internal carotid arteries, middle cerebral arteries, and anterior cerebral arteries are intact without hemodynamically significant stenosis.  There is no evidence of aneurysm or vascular malformation.      IMPRESSION:          1.   Right carotid system:  No hemodynamically significant stenosis.        2.   Left carotid system:  No hemodynamically significant stenosis.        3.   Intracranial circulation:  No hemodynamically significant stenosis.    < end of copied text >    < from: CT Head No Cont (01.20.20 @ 11:42) >    EXAM:  CT BRAIN                            PROCEDURE DATE:  01/20/2020          INTERPRETATION:  Exam Date: 1/20/2020 11:42 AM    CT head without IV contrast    CLINICAL INFORMATION:  acute vertigo unsteadygait htn ro ich ro cva    TECHNIQUE: Contiguous axial sections were obtained through the head.   This scan was performed using automatic exposure control (radiation dose reduction software) to obtain a diagnostic image quality scan with patient dose as low as reasonably achievable.      COMPARISON: No previous examinations are available for review.     FINDINGS:       There is no evidence of intraparenchymal or extraaxial hemorrhage.   There is no CT evidence of large vessel acute infarct. No mass effect is found in the brain.  No evidence of midline shift or herniation pattern.    The ventricles, sulci and basal cisterns appear unremarkable.    Visualized paranasal sinuses are clear.      IMPRESSION:       No acute intracranial findings.    < end of copied text >    Consultant(s) Notes Reviewed:  [  ] YES     Care Discussed with [X] Consultants  [  ] Patient  [  ] Family  [  ]   [  ] Social Service  [  ] RN, [  ] Physical Therapy  DVT PPX: [  ] Lovenox, [  ] S C Heparin, [  ] Coumadin, [  ] Xarelto, [  ] Eliquis, [  ] Pradaxa, [  ] IV Heparin drip, [  ] SCD [  ] Contraindication 2 to GI Bleed,[  ] Ambulation  Advanced directive: [  ] None, [  ] DNR/DNI

## 2020-01-21 NOTE — OCCUPATIONAL THERAPY INITIAL EVALUATION ADULT - PERTINENT HX OF CURRENT PROBLEM, REHAB EVAL
Pt is a 68 y/o female sent in from her PMD office due to unsteady gait; when she awoke out of bed she felt unsteady on her feet, she could not keep her balance and was dizzy. CT head negative for CVA.

## 2020-01-21 NOTE — PROGRESS NOTE ADULT - PROBLEM SELECTOR PLAN 2
Diabetic diet, continue insulin pump.   -Diabetes specialist consult for insulin pump mgmt  -HbA1c 7.7  -F/u w endocrinology as outpt

## 2020-01-21 NOTE — OCCUPATIONAL THERAPY INITIAL EVALUATION ADULT - REHAB POTENTIAL, OT EVAL
N/A; pt is at PLOF (independent); no skilled OT needs at this time- please re-consult if clinically indicated

## 2020-01-21 NOTE — DISCHARGE NOTE PROVIDER - HOSPITAL COURSE
HPI: Pt is a 70 yo F sent in from her PMD office due to unsteady gait to be evaluated for CVA. PMH DM1 on insulin pump, HTN, Hypothyroidism (had graves disease in the past), HLD.     She states that this morning when she awoke out of bed she felt unsteady on her feet, she could not keep her balance and was dizzy. She went to see her PMD and there was concern for stroke and so she waas sent to the ED.     In the ED patient continues to feel "wobbly" on her feet and said she has dysequilibrium. Denies any past hx of TIA/CVA/MI. She has no other focal deficitis, slurred speech, weakness, nausea, vomiting, chest pain, palpitations, SOB, abd pain, diarrhea, melena, hematochezia, dysuria. In ED patient seen by neurology who recommended admission.        Hospital Course: Admitted for CVA workup, CT head without acute stroke, CTA Head and neck found to be unremarkable. MR Brain without acute pathology, TTE unremarkable. Pt was started on full dose aspirin, continued on statins.  Endo (perlman) consulted for DM and hypothyroidism, tsh found to be low, pt's synthroid dose was adjusted, insulin pump continued per pt's home settings, other home meds continued and adjusted as appropriate.  PT/OT evaluation performed, pt is independent with no skilled OT/PT needs. Pt responded well to above treatments and interventions, initial symptoms resolved over hospital course, pt seen and examined day of discharge, was medically optimized and stable for discharge home with PCP follow up. HPI: Pt is a 70 yo F sent in from her PMD office due to unsteady gait to be evaluated for CVA. PMH DM1 on insulin pump, HTN, Hypothyroidism (had graves disease in the past), HLD.     She states that this morning when she awoke out of bed she felt unsteady on her feet, she could not keep her balance and was dizzy. She went to see her PMD and there was concern for stroke and so she waas sent to the ED.     In the ED patient continues to feel "wobbly" on her feet and said she has dysequilibrium. Denies any past hx of TIA/CVA/MI. She has no other focal deficitis, slurred speech, weakness, nausea, vomiting, chest pain, palpitations, SOB, abd pain, diarrhea, melena, hematochezia, dysuria. In ED patient seen by neurology who recommended admission.        Hospital Course: Admitted for CVA workup, CT head without acute stroke, CTA Head and neck found to be unremarkable. MR Brain without acute pathology, TTE unremarkable. Pt was started on full dose aspirin, continued on her home hyperlipidemia agent.  Endo (perlman) consulted for DM and hypothyroidism, tsh found to be low, pt's synthroid dose was adjusted, insulin pump continued per pt's home settings, other home meds continued and adjusted as appropriate.  PT/OT evaluation performed, pt is independent with no skilled OT/PT needs. Pt responded well to above treatments and interventions, initial symptoms resolved over hospital course, pt seen and examined day of discharge, was medically optimized and stable for discharge home with PCP follow up.

## 2020-01-21 NOTE — PHYSICAL THERAPY INITIAL EVALUATION ADULT - PERTINENT HX OF CURRENT PROBLEM, REHAB EVAL
Pt was sent in from her PCP due to unsteady gait to r/o CVA. Pt has a PMH of DM1 on insulin pump, HTN, Hypothyroidism (had graves disease in the past), HLD. 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 Pt states she is under some stress at home, which may be attributing to this dizziness occurrence. Pt is awaiting MRI results.

## 2020-01-21 NOTE — DISCHARGE NOTE NURSING/CASE MANAGEMENT/SOCIAL WORK - PATIENT PORTAL LINK FT
You can access the FollowMyHealth Patient Portal offered by [facility] by registering at the following website: [email or web address]. By joining [employer]’s FollowMyHealth portal, you will also be able to view your health information using other applications (apps) compatible with our system.

## 2020-01-21 NOTE — DISCHARGE NOTE PROVIDER - NSDCCPCAREPLAN_GEN_ALL_CORE_FT
PRINCIPAL DISCHARGE DIAGNOSIS  Diagnosis: Ataxia  Assessment and Plan of Treatment: You were worked up for a possible stroke on admission. CT and MRI were negative without evidence of any acute stroke. Other workup were also found unremarkable.   Continue with daily baby aspirin and statin medication as prescribed  Follow up with your PCP within 1 week of discharge      SECONDARY DISCHARGE DIAGNOSES  Diagnosis: Hypothyroidism  Assessment and Plan of Treatment: Your TSH was found to be low, you were evaluated by an endocrinologist who recommended lowering the dose your synthroid  Please continue with the new dose of synthroid as prescribed    Diagnosis: Diabetes mellitus type 1, uncontrolled  Assessment and Plan of Treatment: Continue with insulin pump PRINCIPAL DISCHARGE DIAGNOSIS  Diagnosis: TIA (transient ischemic attack)  Assessment and Plan of Treatment: You were worked up for a possible stroke on admission. CT and MRI were negative without evidence of any acute stroke. Other workup were also found unremarkable.   Continue with Aspirin 325 mg daily to complete two weeks, and then 81mg daily baby aspirin and your hyperlipidemia medication  Follow up with your PCP within 1 week of discharge      SECONDARY DISCHARGE DIAGNOSES  Diagnosis: Hypothyroidism  Assessment and Plan of Treatment: Your TSH was found to be low, you were evaluated by an endocrinologist who recommended lowering the dose your synthroid  Please continue with the new dose of synthroid as prescribed    Diagnosis: Diabetes mellitus type 1, uncontrolled  Assessment and Plan of Treatment: Continue with insulin pump, follow with endocrinology.

## 2020-01-21 NOTE — PHYSICAL THERAPY INITIAL EVALUATION ADULT - CRITERIA FOR SKILLED THERAPEUTIC INTERVENTIONS
No impairments found, pt is not a candidate for skilled PT services. Pt is not a candidate for skilled PT services.

## 2020-01-21 NOTE — DISCHARGE NOTE PROVIDER - NSDCMRMEDTOKEN_GEN_ALL_CORE_FT
Calcium 600+D 600 mg-200 intl units (5 mcg) oral tablet: 1 tab(s) orally 2 times a day  CoQ10 300 mg oral capsule: 1 cap(s) orally once a day  doxycycline 20 mg oral capsule: 1 cap(s) orally once a day  insulin pump:   losartan-hydrochlorothiazide 100mg-12.5mg oral tablet: 1 tab(s) orally once a day  Myrbetriq 50 mg oral tablet, extended release: 1 tab(s) orally once a day  NovoLOG 100 units/mL injectable solution:   Praluent Pen 75 mg/mL subcutaneous solution:   Synthroid 150 mcg (0.15 mg) oral tablet: 1 tab(s) orally once a day  Xyzal 5 mg oral tablet: 1 tab(s) orally once a day (in the evening) aspirin 325 mg oral tablet: 1 tab(s) orally once a day for 13 days, then decrease dose to 81mg going forward.  aspirin 81 mg oral tablet: 1 tab(s) orally once a day after completing 2 weeks of 325 mg dose.  Calcium 600+D 600 mg-200 intl units (5 mcg) oral tablet: 1 tab(s) orally 2 times a day  CoQ10 300 mg oral capsule: 1 cap(s) orally once a day  doxycycline 20 mg oral capsule: 1 cap(s) orally once a day  insulin pump:   levothyroxine 137 mcg (0.137 mg) oral tablet: 1 tab(s) orally once a day   losartan-hydrochlorothiazide 100mg-12.5mg oral tablet: 1 tab(s) orally once a day  Myrbetriq 50 mg oral tablet, extended release: 1 tab(s) orally once a day  NovoLOG 100 units/mL injectable solution:   Praluent Pen 75 mg/mL subcutaneous solution:   Xyzal 5 mg oral tablet: 1 tab(s) orally once a day (in the evening)

## 2020-01-21 NOTE — PHYSICAL THERAPY INITIAL EVALUATION ADULT - ADDITIONAL COMMENTS
As per pt, lives at home /c her son. Pt reports she has approximately 4 steps to enter the home /s handrails, but there is a brick wall to hold if needed, although she does not hold on while ascending. Pt states she performs all ADLs and transfers independently /s assistance. Pt is a retired special  & still works part time. Pt usually drives, but her boyfriend drove her to ED due to c/o dizziness. Pt states she does not feel that she will need PT intervention.

## 2020-01-21 NOTE — PROGRESS NOTE ADULT - ASSESSMENT
Pt is a 70 yo F sent in from her PMD office due to unsteady gait to be evaluated for CVA. PMH DM1 on insulin pump, HTN, Hypothyroidism (had graves disease in the past), HLD.

## 2020-01-21 NOTE — PROGRESS NOTE ADULT - PROBLEM SELECTOR PLAN 6
DVT ppx: heparin  IMPROVE VTE Individual Risk Assessment    RISK                                                                Points    [  ] Previous VTE                                                  3    [  ] Thrombophilia                                               2    [  ] Lower limb paralysis                                      2        (unable to hold up >15 seconds)      [  ] Current Cancer                                              2         (within 6 months)  [  ] Immobilization > 24 hrs                                1  [  ] ICU/CCU stay > 24 hours                              1  [ X ] Age > 60                                                      1  IMPROVE VTE Score _1________

## 2020-01-21 NOTE — PHYSICAL THERAPY INITIAL EVALUATION ADULT - GENERAL OBSERVATIONS, REHAB EVAL
Pt received sitting in recliner chair, awake & alert. Pt showed no altered performance of visual tracking & coordination assessments. Pt was agreeable to PT.

## 2020-01-23 ENCOUNTER — OTHER (OUTPATIENT)
Age: 70
End: 2020-01-23

## 2020-01-23 PROBLEM — E11.9 TYPE 2 DIABETES MELLITUS WITHOUT COMPLICATIONS: Chronic | Status: ACTIVE | Noted: 2020-01-20

## 2020-01-23 PROBLEM — E78.5 HYPERLIPIDEMIA, UNSPECIFIED: Chronic | Status: ACTIVE | Noted: 2020-01-20

## 2020-01-23 PROBLEM — E05.00 THYROTOXICOSIS WITH DIFFUSE GOITER WITHOUT THYROTOXIC CRISIS OR STORM: Chronic | Status: ACTIVE | Noted: 2020-01-20

## 2020-01-24 ENCOUNTER — CHART COPY (OUTPATIENT)
Age: 70
End: 2020-01-24

## 2020-01-30 ENCOUNTER — APPOINTMENT (OUTPATIENT)
Dept: ENDOCRINOLOGY | Facility: CLINIC | Age: 70
End: 2020-01-30
Payer: MEDICARE

## 2020-01-30 PROCEDURE — 76536 US EXAM OF HEAD AND NECK: CPT

## 2020-03-10 ENCOUNTER — APPOINTMENT (OUTPATIENT)
Dept: ENDOCRINOLOGY | Facility: CLINIC | Age: 70
End: 2020-03-10
Payer: MEDICARE

## 2020-03-10 VITALS
DIASTOLIC BLOOD PRESSURE: 70 MMHG | WEIGHT: 126 LBS | OXYGEN SATURATION: 96 % | BODY MASS INDEX: 23.79 KG/M2 | SYSTOLIC BLOOD PRESSURE: 132 MMHG | HEIGHT: 61 IN | HEART RATE: 69 BPM

## 2020-03-10 LAB — GLUCOSE BLDC GLUCOMTR-MCNC: 121

## 2020-03-10 PROCEDURE — 82962 GLUCOSE BLOOD TEST: CPT

## 2020-03-10 PROCEDURE — 99214 OFFICE O/P EST MOD 30 MIN: CPT | Mod: 25

## 2020-03-10 PROCEDURE — 95251 CONT GLUC MNTR ANALYSIS I&R: CPT

## 2020-03-10 NOTE — PHYSICAL EXAM
[Alert] : alert [No Acute Distress] : no acute distress [Well Nourished] : well nourished [Well Developed] : well developed [Normal Sclera/Conjunctiva] : normal sclera/conjunctiva [EOMI] : extra ocular movement intact [No Proptosis] : no proptosis [Thyroid Not Enlarged] : the thyroid was not enlarged [No Thyroid Nodules] : there were no palpable thyroid nodules [No Respiratory Distress] : no respiratory distress [No Accessory Muscle Use] : no accessory muscle use [Clear to Auscultation] : lungs were clear to auscultation bilaterally [Normal Rate] : heart rate was normal  [Normal S1, S2] : normal S1 and S2 [Regular Rhythm] : with a regular rhythm [Pedal Pulses Normal] : the pedal pulses are present [No Edema] : there was no peripheral edema [Post Cervical Nodes] : posterior cervical nodes [Anterior Cervical Nodes] : anterior cervical nodes [Axillary Nodes] : axillary nodes [No Spinal Tenderness] : no spinal tenderness [Spine Straight] : spine straight [No Stigmata of Cushings Syndrome] : no stigmata of cushings syndrome [Normal Gait] : normal gait [Normal Strength/Tone] : muscle strength and tone were normal [No Rash] : no rash [Normal Reflexes] : deep tendon reflexes were 2+ and symmetric [No Tremors] : no tremors [Oriented x3] : oriented to person, place, and time [Normal Hearing] : hearing was normal [Supple] : the neck was supple [No LAD] : no lymphadenopathy [Normal Rate and Effort] : normal respiratory rhythm and effort [Acanthosis Nigricans] : no acanthosis nigricans [Right Foot Was Examined] : right foot ~C was examined [Left Foot Was Examined] : left foot ~C was examined [Normal] : normal [Full ROM] : with full range of motion [Diminished Throughout Both Feet] : normal tactile sensation with monofilament testing throughout both feet [No Motor Deficits] : the motor exam was normal [Normal Insight/Judgement] : insight and judgment were intact [Normal Mood] : the mood was normal

## 2020-03-10 NOTE — ASSESSMENT
[FreeTextEntry1] : 68 y/o female with Type 1 DM, Hypothyroidism, and Hyperlipidemia. \par \par Plan: \par Type 1 DM: suboptimal control  \par Rose reviewd along with pump \par  pt does over-boulus  at times \par  makes sure she is accurately coutnign carbs -set appt wit Densie or Natali- to do pump site cahnge- pt still gettingbubles in infusion set\par - if not helped- will see if Pt would like PmniPod- showed pt demo and gave her brochure \par \par Rose reviewd  avg   +/- 72.1  Coeff of variaiton 41% \par Very High >250 10%\par  high  181-250  24% \par 60% in targert range  \par low 54-59  4% \par very low  < 54  2% \par worn 90% of time  \par \par - educated on healthy food choices\par - encouraged to continue routine exercise\par \par \par Hypothyroidism: Cont Lt4  150 mcg qd \par - repeat TFTs in 4 weeks\par \par 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

## 2020-03-10 NOTE — HISTORY OF PRESENT ILLNESS
[FreeTextEntry1] : Quality: Type 1 DM\par Severity: moderate\par Duration: 48 years\par Onset: legs cramps\par Modifying Factors: Better with insulin \par Associated Symptoms:\par \par Current Regimen:\par Novolog via Medtronic pump \par \par - Levothyroxine 150 mcg daily \par \par Self blood sugar monitorin-10 times a day, per Medtronic pump download - high blood sugar at bedtime and over night, \par 3 inconsistent lows that were corrected with food\par pt. reports a lot of bubbles in the tube when changing the pump site\par \par exercise: yuridia, drums, 3-4 times a week\par \par Diet:\par B- dry cereal, oatmeal, toast -low carb bread\par L- salad, fish, \par D- fish with vegetable, starch, no too much pasta\par Snacks- fruit, sugar free Jello, thin sugar free cookies\par \par Date of last eye exam: 2019 (-) diabetic retinopathy \par Date of last foot exam: 2019\par Date of last flu vaccine: 2018\par Date of last Pneumovax: 5 years ago\par has been off cholesterol meds for a few motnhs now \par  \par  \par \par Variable bs - pt still having trouble with infusion sets- gets a lot of bubbles int he tubing \par \par Saw NEuro- No TIA \par \par to follow up with cardiology on  \par \par Using [employer] cristina-  likes it \par

## 2020-03-10 NOTE — REVIEW OF SYSTEMS
[Negative] : Heme/Lymph [Fatigue] : no fatigue [Decreased Appetite] : appetite not decreased [Recent Weight Gain (___ Lbs)] : recent [unfilled] ~Ulb weight gain [Visual Field Defect] : no visual field defect [Blurry Vision] : no blurred vision [Dry Eyes] : dryness of the eyes [Dysphagia] : no dysphagia [Dysphonia] : no dysphonia [Neck Pain] : no neck pain [Chest Pain] : no chest pain [Palpitations] : no palpitations [SOB on Exertion] : no shortness of breath during exertion [Constipation] : constipation [Diarrhea] : no diarrhea [Polyuria] : no polyuria [Dysuria] : no dysuria [Hair Loss] : no hair loss [Dry Skin] : dry skin [Headache] : no headaches [Tremors] : no tremors [Depression] : no depression [Anxiety] : anxiety [Polydipsia] : no polydipsia [Cold Intolerance] : cold tolerant [Heat Intolerance] : heat tolerant [Easy Bruising] : no tendency for easy bruising [Swelling] : no swelling [FreeTextEntry7] : sometimes  [de-identified] : at times

## 2020-03-18 RX ORDER — ALIROCUMAB 75 MG/ML
75 INJECTION, SOLUTION SUBCUTANEOUS
Qty: 6 | Refills: 3 | Status: DISCONTINUED | COMMUNITY
Start: 2017-03-02 | End: 2020-03-18

## 2020-03-22 ENCOUNTER — RX RENEWAL (OUTPATIENT)
Age: 70
End: 2020-03-22

## 2020-03-23 ENCOUNTER — APPOINTMENT (OUTPATIENT)
Dept: CARDIOLOGY | Facility: CLINIC | Age: 70
End: 2020-03-23
Payer: MEDICARE

## 2020-03-23 ENCOUNTER — NON-APPOINTMENT (OUTPATIENT)
Age: 70
End: 2020-03-23

## 2020-03-23 VITALS
HEIGHT: 61 IN | SYSTOLIC BLOOD PRESSURE: 146 MMHG | WEIGHT: 134 LBS | HEART RATE: 73 BPM | DIASTOLIC BLOOD PRESSURE: 57 MMHG | OXYGEN SATURATION: 97 % | BODY MASS INDEX: 25.3 KG/M2

## 2020-03-23 PROCEDURE — 99214 OFFICE O/P EST MOD 30 MIN: CPT

## 2020-03-23 PROCEDURE — 93000 ELECTROCARDIOGRAM COMPLETE: CPT

## 2020-04-09 LAB
25(OH)D3 SERPL-MCNC: 55.5 NG/ML
ALBUMIN SERPL ELPH-MCNC: 4.4 G/DL
ALP BLD-CCNC: 80 U/L
ALT SERPL-CCNC: 21 U/L
ANION GAP SERPL CALC-SCNC: 16 MMOL/L
APPEARANCE: CLEAR
AST SERPL-CCNC: 23 U/L
BASOPHILS # BLD AUTO: 0.03 K/UL
BASOPHILS NFR BLD AUTO: 0.4 %
BILIRUB SERPL-MCNC: 0.3 MG/DL
BILIRUBIN URINE: NEGATIVE
BLOOD URINE: NEGATIVE
BUN SERPL-MCNC: 16 MG/DL
CALCIUM SERPL-MCNC: 9.4 MG/DL
CHLORIDE SERPL-SCNC: 99 MMOL/L
CHOLEST SERPL-MCNC: 169 MG/DL
CHOLEST/HDLC SERPL: 1.6 RATIO
CO2 SERPL-SCNC: 27 MMOL/L
COLOR: NORMAL
CREAT SERPL-MCNC: 0.65 MG/DL
CREAT SPEC-SCNC: 26 MG/DL
EOSINOPHIL # BLD AUTO: 0.11 K/UL
EOSINOPHIL NFR BLD AUTO: 1.6 %
ESTIMATED AVERAGE GLUCOSE: 171 MG/DL
GLUCOSE QUALITATIVE U: NEGATIVE
GLUCOSE SERPL-MCNC: 141 MG/DL
HBA1C MFR BLD HPLC: 7.6 %
HCT VFR BLD CALC: 40.1 %
HDLC SERPL-MCNC: 107 MG/DL
HGB BLD-MCNC: 12.9 G/DL
IMM GRANULOCYTES NFR BLD AUTO: 0.1 %
KETONES URINE: NORMAL
LDLC SERPL CALC-MCNC: 55 MG/DL
LEUKOCYTE ESTERASE URINE: NEGATIVE
LYMPHOCYTES # BLD AUTO: 1.23 K/UL
LYMPHOCYTES NFR BLD AUTO: 17.6 %
MAGNESIUM SERPL-MCNC: 2 MG/DL
MAN DIFF?: NORMAL
MCHC RBC-ENTMCNC: 31 PG
MCHC RBC-ENTMCNC: 32.2 GM/DL
MCV RBC AUTO: 96.4 FL
MICROALBUMIN 24H UR DL<=1MG/L-MCNC: <1.2 MG/DL
MICROALBUMIN/CREAT 24H UR-RTO: NORMAL MG/G
MONOCYTES # BLD AUTO: 0.56 K/UL
MONOCYTES NFR BLD AUTO: 8 %
NEUTROPHILS # BLD AUTO: 5.05 K/UL
NEUTROPHILS NFR BLD AUTO: 72.3 %
NITRITE URINE: NEGATIVE
PH URINE: 6.5
PHOSPHATE SERPL-MCNC: 3.9 MG/DL
PLATELET # BLD AUTO: 225 K/UL
POTASSIUM SERPL-SCNC: 4.1 MMOL/L
PROT SERPL-MCNC: 6.6 G/DL
PROTEIN URINE: NEGATIVE
RBC # BLD: 4.16 M/UL
RBC # FLD: 14.6 %
SODIUM SERPL-SCNC: 142 MMOL/L
SPECIFIC GRAVITY URINE: 1.01
T3FREE SERPL-MCNC: 4.23 PG/ML
T4 FREE SERPL-MCNC: 1.8 NG/DL
TRIGL SERPL-MCNC: 40 MG/DL
TSH SERPL-ACNC: 0.05 UIU/ML
TSI ACT/NOR SER: 82.4 IU/L
UROBILINOGEN URINE: NORMAL
VIT B12 SERPL-MCNC: 1427 PG/ML
WBC # FLD AUTO: 6.99 K/UL

## 2020-05-05 ENCOUNTER — APPOINTMENT (OUTPATIENT)
Dept: INTERNAL MEDICINE | Facility: CLINIC | Age: 70
End: 2020-05-05

## 2020-05-21 DIAGNOSIS — Z11.59 ENCOUNTER FOR SCREENING FOR OTHER VIRAL DISEASES: ICD-10-CM

## 2020-05-26 LAB
SARS-COV-2 IGG SERPL IA-ACNC: <0.1 INDEX
SARS-COV-2 IGG SERPL QL IA: NEGATIVE

## 2020-07-17 ENCOUNTER — APPOINTMENT (OUTPATIENT)
Dept: ENDOCRINOLOGY | Facility: CLINIC | Age: 70
End: 2020-07-17
Payer: MEDICARE

## 2020-07-17 PROCEDURE — 36415 COLL VENOUS BLD VENIPUNCTURE: CPT

## 2020-07-20 LAB
25(OH)D3 SERPL-MCNC: 48.1 NG/ML
ALBUMIN SERPL ELPH-MCNC: 4.2 G/DL
ALP BLD-CCNC: 66 U/L
ALT SERPL-CCNC: 18 U/L
ANION GAP SERPL CALC-SCNC: 15 MMOL/L
AST SERPL-CCNC: 23 U/L
BASOPHILS # BLD AUTO: 0.04 K/UL
BASOPHILS NFR BLD AUTO: 0.6 %
BILIRUB SERPL-MCNC: 0.3 MG/DL
BUN SERPL-MCNC: 21 MG/DL
CALCIUM SERPL-MCNC: 9.5 MG/DL
CHLORIDE SERPL-SCNC: 101 MMOL/L
CHOLEST SERPL-MCNC: 164 MG/DL
CHOLEST/HDLC SERPL: 1.9 RATIO
CO2 SERPL-SCNC: 27 MMOL/L
CREAT SERPL-MCNC: 0.74 MG/DL
CREAT SPEC-SCNC: 50 MG/DL
EOSINOPHIL # BLD AUTO: 0.33 K/UL
EOSINOPHIL NFR BLD AUTO: 5.2 %
ESTIMATED AVERAGE GLUCOSE: 171 MG/DL
GLUCOSE SERPL-MCNC: 151 MG/DL
HBA1C MFR BLD HPLC: 7.6 %
HCT VFR BLD CALC: 37.9 %
HDLC SERPL-MCNC: 87 MG/DL
HGB BLD-MCNC: 12 G/DL
IMM GRANULOCYTES NFR BLD AUTO: 0.2 %
LDLC SERPL CALC-MCNC: 65 MG/DL
LYMPHOCYTES # BLD AUTO: 1.36 K/UL
LYMPHOCYTES NFR BLD AUTO: 21.6 %
MAGNESIUM SERPL-MCNC: 2.1 MG/DL
MAN DIFF?: NORMAL
MCHC RBC-ENTMCNC: 30.7 PG
MCHC RBC-ENTMCNC: 31.7 GM/DL
MCV RBC AUTO: 96.9 FL
MICROALBUMIN 24H UR DL<=1MG/L-MCNC: <1.2 MG/DL
MICROALBUMIN/CREAT 24H UR-RTO: NORMAL MG/G
MONOCYTES # BLD AUTO: 0.65 K/UL
MONOCYTES NFR BLD AUTO: 10.3 %
NEUTROPHILS # BLD AUTO: 3.91 K/UL
NEUTROPHILS NFR BLD AUTO: 62.1 %
PHOSPHATE SERPL-MCNC: 3.8 MG/DL
PLATELET # BLD AUTO: 233 K/UL
POTASSIUM SERPL-SCNC: 4.2 MMOL/L
PROT SERPL-MCNC: 6.1 G/DL
RBC # BLD: 3.91 M/UL
RBC # FLD: 15 %
SODIUM SERPL-SCNC: 143 MMOL/L
T3FREE SERPL-MCNC: 3.71 PG/ML
T4 FREE SERPL-MCNC: 1.6 NG/DL
TRIGL SERPL-MCNC: 56 MG/DL
TSH SERPL-ACNC: 0.06 UIU/ML
TSI ACT/NOR SER: 95 IU/L
VIT B12 SERPL-MCNC: 1092 PG/ML
WBC # FLD AUTO: 6.3 K/UL

## 2020-07-23 ENCOUNTER — APPOINTMENT (OUTPATIENT)
Dept: ENDOCRINOLOGY | Facility: CLINIC | Age: 70
End: 2020-07-23
Payer: MEDICARE

## 2020-07-23 VITALS
DIASTOLIC BLOOD PRESSURE: 68 MMHG | HEART RATE: 73 BPM | BODY MASS INDEX: 23.6 KG/M2 | WEIGHT: 125 LBS | SYSTOLIC BLOOD PRESSURE: 134 MMHG | HEIGHT: 61 IN

## 2020-07-23 PROCEDURE — 95251 CONT GLUC MNTR ANALYSIS I&R: CPT

## 2020-07-23 PROCEDURE — 82962 GLUCOSE BLOOD TEST: CPT

## 2020-07-23 PROCEDURE — 99214 OFFICE O/P EST MOD 30 MIN: CPT | Mod: 25

## 2020-07-24 LAB — GLUCOSE BLDC GLUCOMTR-MCNC: 226

## 2020-07-27 NOTE — PHYSICAL EXAM
[Alert] : alert [Well Nourished] : well nourished [No Acute Distress] : no acute distress [Well Developed] : well developed [Normal Sclera/Conjunctiva] : normal sclera/conjunctiva [EOMI] : extra ocular movement intact [No Proptosis] : no proptosis [Thyroid Not Enlarged] : the thyroid was not enlarged [No Thyroid Nodules] : no palpable thyroid nodules [No Respiratory Distress] : no respiratory distress [No Accessory Muscle Use] : no accessory muscle use [Clear to Auscultation] : lungs were clear to auscultation bilaterally [Normal S1, S2] : normal S1 and S2 [Regular Rhythm] : with a regular rhythm [Normal Rate] : heart rate was normal [No Edema] : no peripheral edema [Pedal Pulses Normal] : the pedal pulses are present [Normal Anterior Cervical Nodes] : no anterior cervical lymphadenopathy [Normal Posterior Cervical Nodes] : no posterior cervical lymphadenopathy [No Stigmata of Cushings Syndrome] : no stigmata of Cushings Syndrome [Normal Gait] : normal gait [Normal Strength/Tone] : muscle strength and tone were normal [No Rash] : no rash [Acanthosis Nigricans] : no acanthosis nigricans [Normal Reflexes] : deep tendon reflexes were 2+ and symmetric [No Tremors] : no tremors [Oriented x3] : oriented to person, place, and time

## 2020-07-27 NOTE — REVIEW OF SYSTEMS
[Visual Field Defect] : no visual field defect [Negative] : Heme/Lymph [FreeTextEntry3] : some trouble with reading

## 2020-07-27 NOTE — HISTORY OF PRESENT ILLNESS
[FreeTextEntry1] : Quality: Type 1 DM\par Severity: moderate\par Duration: 48 years\par Onset: legs cramps\par Modifying Factors: Better with insulin \par Associated Symptoms:\par \par Current Regimen:\par Novolog via Medtronic pump \par \par - Levothyroxine 150 mcg daily \par \par Self blood sugar monitorin-10 times a day, per Medtronic pump download - high blood sugar at bedtime and over night, \par 3 inconsistent lows that were corrected with food\par pt. reports a lot of bubbles in the tube when changing the pump site\par \par exercise: yuridia, drums, 3-4 times a week but doing things virtually \par \par Diet:\par B- dry cereal, oatmeal, toast -low carb bread\par L- salad, fish, \par D- fish with vegetable, starch, no too much pasta\par Snacks- fruit, sugar free Jello, thin sugar free cookies\par \par Date of last eye exam: 2019 (-) diabetic retinopathy \par Date of last foot exam: 2019\par Date of last flu vaccine: 2018\par Date of last Pneumovax: 5 years ago\par \par  attimes - not sure why \par pt still having trouble with infusion sets- gets a lot of bubbles int he tubing \par \par Saw NEuro- No TIA \par \par to follow up with cardiology on  \par \par Using [employer] cristina-  likes it \par

## 2020-10-08 ENCOUNTER — APPOINTMENT (OUTPATIENT)
Dept: ENDOCRINOLOGY | Facility: CLINIC | Age: 70
End: 2020-10-08

## 2020-10-14 LAB — MICROALBUMIN/CREAT 24H UR-RTO: NORMAL

## 2020-10-15 ENCOUNTER — APPOINTMENT (OUTPATIENT)
Dept: ENDOCRINOLOGY | Facility: CLINIC | Age: 70
End: 2020-10-15
Payer: MEDICARE

## 2020-10-15 VITALS
DIASTOLIC BLOOD PRESSURE: 70 MMHG | HEIGHT: 61 IN | SYSTOLIC BLOOD PRESSURE: 130 MMHG | BODY MASS INDEX: 24.35 KG/M2 | HEART RATE: 74 BPM | WEIGHT: 129 LBS

## 2020-10-15 PROCEDURE — 82962 GLUCOSE BLOOD TEST: CPT

## 2020-10-15 PROCEDURE — 99215 OFFICE O/P EST HI 40 MIN: CPT | Mod: 25

## 2020-10-15 PROCEDURE — 95251 CONT GLUC MNTR ANALYSIS I&R: CPT

## 2020-10-20 LAB — GLUCOSE BLDC GLUCOMTR-MCNC: 161

## 2020-11-06 ENCOUNTER — NON-APPOINTMENT (OUTPATIENT)
Age: 70
End: 2020-11-06

## 2020-11-06 ENCOUNTER — APPOINTMENT (OUTPATIENT)
Dept: CARDIOLOGY | Facility: CLINIC | Age: 70
End: 2020-11-06
Payer: MEDICARE

## 2020-11-06 VITALS
DIASTOLIC BLOOD PRESSURE: 69 MMHG | SYSTOLIC BLOOD PRESSURE: 124 MMHG | HEART RATE: 78 BPM | OXYGEN SATURATION: 98 % | BODY MASS INDEX: 24.73 KG/M2 | HEIGHT: 61 IN | WEIGHT: 131 LBS

## 2020-11-06 PROCEDURE — 93000 ELECTROCARDIOGRAM COMPLETE: CPT

## 2020-11-06 PROCEDURE — 99214 OFFICE O/P EST MOD 30 MIN: CPT

## 2020-12-21 PROBLEM — Z87.09 HISTORY OF ACUTE SINUSITIS: Status: RESOLVED | Noted: 2019-12-18 | Resolved: 2020-12-21

## 2021-01-14 ENCOUNTER — APPOINTMENT (OUTPATIENT)
Dept: ENDOCRINOLOGY | Facility: CLINIC | Age: 71
End: 2021-01-14

## 2021-01-15 NOTE — ASSESSMENT
[FreeTextEntry1] : 71 y/o female with Type 1 DM, Hypothyroidism, and Hyperlipidemia. \par \par Plan: \par Type 1 DM: suboptimal control  \par Rose reviewd along with pump \par willtry to do overnoiht bsal check \par  makes sure she is accurately coutnign carbs -\par \par \par Rose reviewd  avg   +/- 72.1  Coeff of variaiton 41% \par Very High >250 %\par  high  181-250  33% \par 54%in targert range  \par low 54-59  3% \par very low  < 54  1% \par worn 90% of time  \par \par - educated on healthy food choices\par - encouraged to continue routine exercise\par consider Rose 2 \par \par follwoup with Neurology \par  Graves opthalmopathy - due to presence of TSI  pt has never been treated wit DAWKINS and was never hyperthryoid as far as she can recall \par  stop alpha lipoic acid  may be uinterefeirng with thryoid levles \par \par Hypothyroidism: Cont Lt4  125 mcg qd \par stop alpha lipoic acid \par - repeat TFTs in 4 weeks\par \par 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 \par \par This patient with diabetes (E10.9) performs 4 glucose checks per day utilizing a home blood glucose monitor. The patient is treated with insulin via a subcutaneous insulin infusion pump. This patient requires frequent adjustments to their insulin treatment on the basis of blood glucose monitoring results via adjusting pump settings. In addition, the patient has been to our office for an evaluation of their diabetes control within the past 6 months. \par \par

## 2021-01-15 NOTE — HISTORY OF PRESENT ILLNESS
[FreeTextEntry1] : Quality: Type 1 DM\par Severity: moderate\par Duration: 48 years\par Onset: legs cramps\par Modifying Factors: Better with insulin \par Associated Symptoms:\par \par Current Regimen:\par Novolog via Medtronic pump \par \par - Levothyroxine 150 mcg daily \par \par Self blood sugar monitorin-10 times a day, per Medtronic pump download - high blood sugar at bedtime and over night, \par \par \par exercise: limiteed with COvid \par \par Diet:\par B- dry cereal, oatmeal, toast -low carb bread\par L- salad, fish, \par D- fish with vegetable, starch, no too much pasta\par Snacks- fruit, sugar free Jello, thin sugar free cookies\par \par Date of last eye exam: 2019 (-) diabetic retinopathy \par Date of last foot exam: 2019\par Date of last flu vaccine: 2018\par Date of last Pneumovax: 5 years ago\par \par Saw NEuro- No TIA \par \par had MRI of head to see NEuro \par waking up with HA in Am \par henrietta vertigo symptoms lately \par  did not have TIA  per neurology \par \par currenlty on Coq10  alpha lipoic acid  Myrbetriq Doxycycline \par xyzal  MVI \par solifenacin as well as other med \par \par Using [employer] cristina- \par

## 2021-01-15 NOTE — PHYSICAL EXAM
[Alert] : alert [Well Nourished] : well nourished [No Acute Distress] : no acute distress [Well Developed] : well developed [Normal Sclera/Conjunctiva] : normal sclera/conjunctiva [EOMI] : extra ocular movement intact [No Proptosis] : no proptosis [Thyroid Not Enlarged] : the thyroid was not enlarged [No Thyroid Nodules] : no palpable thyroid nodules [No Respiratory Distress] : no respiratory distress [No Accessory Muscle Use] : no accessory muscle use [Clear to Auscultation] : lungs were clear to auscultation bilaterally [Normal S1, S2] : normal S1 and S2 [Normal Rate] : heart rate was normal [Regular Rhythm] : with a regular rhythm [No Edema] : no peripheral edema [Pedal Pulses Normal] : the pedal pulses are present [Normal Anterior Cervical Nodes] : no anterior cervical lymphadenopathy [No Stigmata of Cushings Syndrome] : no stigmata of Cushings Syndrome [Normal Gait] : normal gait [Normal Strength/Tone] : muscle strength and tone were normal [No Rash] : no rash [Normal] : normal [Full ROM] : with full range of motion [Normal Reflexes] : deep tendon reflexes were 2+ and symmetric [No Tremors] : no tremors [Oriented x3] : oriented to person, place, and time [Acanthosis Nigricans] : no acanthosis nigricans [Diminished Throughout Both Feet] : normal tactile sensation with monofilament testing throughout both feet

## 2021-01-15 NOTE — REVIEW OF SYSTEMS
[Negative] : Heme/Lymph [Visual Field Defect] : no visual field defect [FreeTextEntry3] : some trouble with reading

## 2021-01-24 ENCOUNTER — APPOINTMENT (OUTPATIENT)
Dept: ULTRASOUND IMAGING | Facility: CLINIC | Age: 71
End: 2021-01-24
Payer: MEDICARE

## 2021-01-24 ENCOUNTER — OUTPATIENT (OUTPATIENT)
Dept: OUTPATIENT SERVICES | Facility: HOSPITAL | Age: 71
LOS: 1 days | End: 2021-01-24
Payer: MEDICARE

## 2021-01-24 DIAGNOSIS — E05.00 THYROTOXICOSIS WITH DIFFUSE GOITER WITHOUT THYROTOXIC CRISIS OR STORM: ICD-10-CM

## 2021-01-24 DIAGNOSIS — Z98.890 OTHER SPECIFIED POSTPROCEDURAL STATES: Chronic | ICD-10-CM

## 2021-01-24 DIAGNOSIS — E03.9 HYPOTHYROIDISM, UNSPECIFIED: ICD-10-CM

## 2021-01-24 PROCEDURE — 76536 US EXAM OF HEAD AND NECK: CPT | Mod: 26

## 2021-01-24 PROCEDURE — 76536 US EXAM OF HEAD AND NECK: CPT

## 2021-01-27 ENCOUNTER — APPOINTMENT (OUTPATIENT)
Dept: ENDOCRINOLOGY | Facility: CLINIC | Age: 71
End: 2021-01-27

## 2021-02-11 ENCOUNTER — APPOINTMENT (OUTPATIENT)
Dept: ENDOCRINOLOGY | Facility: CLINIC | Age: 71
End: 2021-02-11
Payer: MEDICARE

## 2021-02-11 VITALS
HEART RATE: 71 BPM | HEIGHT: 61 IN | OXYGEN SATURATION: 99 % | BODY MASS INDEX: 23.03 KG/M2 | DIASTOLIC BLOOD PRESSURE: 72 MMHG | WEIGHT: 122 LBS | SYSTOLIC BLOOD PRESSURE: 132 MMHG

## 2021-02-11 LAB
GLUCOSE BLDC GLUCOMTR-MCNC: 103
HBA1C MFR BLD HPLC: 7.4
LDLC SERPL DIRECT ASSAY-MCNC: 52
MICROALBUMIN/CREAT 24H UR-RTO: NORMAL

## 2021-02-11 PROCEDURE — 82962 GLUCOSE BLOOD TEST: CPT

## 2021-02-11 PROCEDURE — 99214 OFFICE O/P EST MOD 30 MIN: CPT | Mod: 25

## 2021-02-11 RX ORDER — AZELASTINE HYDROCHLORIDE 137 UG/1
0.1 SPRAY, METERED NASAL TWICE DAILY
Qty: 1 | Refills: 2 | Status: DISCONTINUED | COMMUNITY
Start: 2019-12-18 | End: 2021-02-11

## 2021-02-14 NOTE — PHYSICAL EXAM
[Alert] : alert [Well Nourished] : well nourished [No Acute Distress] : no acute distress [Normal Sclera/Conjunctiva] : normal sclera/conjunctiva [Well Developed] : well developed [EOMI] : extra ocular movement intact [No Proptosis] : no proptosis [Thyroid Not Enlarged] : the thyroid was not enlarged [No Thyroid Nodules] : no palpable thyroid nodules [No Respiratory Distress] : no respiratory distress [No Accessory Muscle Use] : no accessory muscle use [Clear to Auscultation] : lungs were clear to auscultation bilaterally [Normal S1, S2] : normal S1 and S2 [Normal Rate] : heart rate was normal [Regular Rhythm] : with a regular rhythm [No Edema] : no peripheral edema [Pedal Pulses Normal] : the pedal pulses are present [Normal Anterior Cervical Nodes] : no anterior cervical lymphadenopathy [No Stigmata of Cushings Syndrome] : no stigmata of Cushings Syndrome [Normal Strength/Tone] : muscle strength and tone were normal [Normal Gait] : normal gait [No Rash] : no rash [Acanthosis Nigricans] : no acanthosis nigricans [Normal] : normal [Full ROM] : with full range of motion [Diminished Throughout Both Feet] : normal tactile sensation with monofilament testing throughout both feet [Normal Reflexes] : deep tendon reflexes were 2+ and symmetric [No Tremors] : no tremors [Oriented x3] : oriented to person, place, and time

## 2021-02-14 NOTE — PHYSICAL EXAM
[Alert] : alert [Well Nourished] : well nourished [No Acute Distress] : no acute distress [Well Developed] : well developed [Normal Sclera/Conjunctiva] : normal sclera/conjunctiva [EOMI] : extra ocular movement intact [No Proptosis] : no proptosis [Thyroid Not Enlarged] : the thyroid was not enlarged [No Thyroid Nodules] : no palpable thyroid nodules [No Respiratory Distress] : no respiratory distress [Clear to Auscultation] : lungs were clear to auscultation bilaterally [No Accessory Muscle Use] : no accessory muscle use [Normal S1, S2] : normal S1 and S2 [Normal Rate] : heart rate was normal [Regular Rhythm] : with a regular rhythm [No Edema] : no peripheral edema [Pedal Pulses Normal] : the pedal pulses are present [Normal Anterior Cervical Nodes] : no anterior cervical lymphadenopathy [No Stigmata of Cushings Syndrome] : no stigmata of Cushings Syndrome [Normal Gait] : normal gait [Normal Strength/Tone] : muscle strength and tone were normal [No Rash] : no rash [Acanthosis Nigricans] : no acanthosis nigricans [Normal] : normal [Full ROM] : with full range of motion [Diminished Throughout Both Feet] : normal tactile sensation with monofilament testing throughout both feet [Normal Reflexes] : deep tendon reflexes were 2+ and symmetric [No Tremors] : no tremors [Oriented x3] : oriented to person, place, and time

## 2021-02-14 NOTE — ASSESSMENT
[FreeTextEntry1] : 69 y/o female with Type 1 DM, Hypothyroidism, and Hyperlipidemia. \par \par Plan: \par Type 1 DM: suboptimal control  \par Rose reviewd along with pump \par att iems some higher readign  due to site issues\par  lat 3-4 days numerbs are much better \par  t ahs been trying lower carb items  in diet  \par  makes sure she is accurately coutnign carbs -\par NO Rose TO REVIEW TODAY\par - educated on healthy food choices\par - encouraged to continue routine exercise\par consider dEXCOM \par  pT INTERESTED IN t SLIM AS WELL \par \par \par  Graves opthalmopathy - due to presence of TSI  pt has never been treated wit DAWKINS and was never hyperthryoid as far as she can recall \par REPEAT tft \par  SEE NEUROOPTHALMOLOGY FOR rx FOR gd OPTHAMOLPATHY  \par \par Hypothyroidism: Cont Lt4  125 mcg  1 tab mon thru SAt 1/2 on Sun - check repaet  labs \par stop alpha lipoic acid \par - repeat TFTs in 4 weeks\par \par \par \par Hyperlipidemia: monitor labs, cont  praluent   see caridology \par \par Labs and follow up visit in 3 months. \par \par This patient with diabetes (E10.9) performs 4 glucose checks per day utilizing a home blood glucose monitor. The patient is treated with insulin via a subcutaneous insulin infusion pump. This patient requires frequent adjustments to their insulin treatment on the basis of blood glucose monitoring results via adjusting pump settings. In addition, the patient has been to our office for an evaluation of their diabetes control within the past 6 months. \par \par

## 2021-02-14 NOTE — REVIEW OF SYSTEMS
[Visual Field Defect] : no visual field defect [Negative] : Heme/Lymph [FreeTextEntry3] : some trouble with reading  wearing an eye patch

## 2021-02-14 NOTE — ASSESSMENT
[FreeTextEntry1] : 71 y/o female with Type 1 DM, Hypothyroidism, and Hyperlipidemia. \par \par Plan: \par Type 1 DM: suboptimal control  \par Rose reviewd along with pump \par att iems some higher readign  due to site issues\par  lat 3-4 days numerbs are much better \par  t ahs been trying lower carb items  in diet  \par  makes sure she is accurately coutnign carbs -\par NO Rose TO REVIEW TODAY\par - educated on healthy food choices\par - encouraged to continue routine exercise\par consider dEXCOM \par  pT INTERESTED IN t SLIM AS WELL \par \par \par  Graves opthalmopathy - due to presence of TSI  pt has never been treated wit DAWKINS and was never hyperthryoid as far as she can recall \par REPEAT tft \par  SEE NEUROOPTHALMOLOGY FOR rx FOR gd OPTHAMOLPATHY  \par \par Hypothyroidism: Cont Lt4  125 mcg  1 tab mon thru SAt 1/2 on Sun - check repaet  labs \par stop alpha lipoic acid \par - repeat TFTs in 4 weeks\par \par \par \par Hyperlipidemia: monitor labs, cont  praluent   see caridology \par \par Labs and follow up visit in 3 months. \par \par This patient with diabetes (E10.9) performs 4 glucose checks per day utilizing a home blood glucose monitor. The patient is treated with insulin via a subcutaneous insulin infusion pump. This patient requires frequent adjustments to their insulin treatment on the basis of blood glucose monitoring results via adjusting pump settings. In addition, the patient has been to our office for an evaluation of their diabetes control within the past 6 months. \par \par

## 2021-02-14 NOTE — HISTORY OF PRESENT ILLNESS
[FreeTextEntry1] : Quality: Type 1 DM\par Severity: moderate\par Duration: 48 years\par Onset: legs cramps\par Modifying Factors: Better with insulin \par Associated Symptoms:\par \par Current Regimen:\par Novolog via Medtronic pump \par \par - Levothyroxine 0.125 mg 1 tab  mon thru sat  1/2 on SUn\par \par Self blood sugar monitorin-10 times a day, per Medtronic pump download - high blood sugar at bedtime and over night, \par hates Rose device  \par \par \par \par \par exercise: limiteed with COvid \par \par Diet:  trying keto diet - keto ice cream \par B- dry cereal, oatmeal, toast -low carb bread\par L- salad, fish, \par D- fish with vegetable, starch, no too much pasta\par Snacks- fruit, sugar free Jello, thin sugar free cookies\par \par Date of last eye exam: 2019 (-) diabetic retinopathy \par Date of last foot exam: 2019\par Date of last flu vaccine: 2018\par Date of last Pneumovax: 5 years ago\par \par Saw NEuro- No TIA \par \par had MRI of head to see NEuro \par still waiting to do TX for graves opthalmopathy \par has been on hold \par \par currenlty on Coq10  Myrbetriq Doxycycline \par xyzal  MVI \par solifenacin as well as other med \par \par Using Fresestyle rose- \par

## 2021-04-14 ENCOUNTER — RX RENEWAL (OUTPATIENT)
Age: 71
End: 2021-04-14

## 2021-05-28 ENCOUNTER — APPOINTMENT (OUTPATIENT)
Dept: ENDOCRINOLOGY | Facility: CLINIC | Age: 71
End: 2021-05-28
Payer: MEDICARE

## 2021-05-28 VITALS
DIASTOLIC BLOOD PRESSURE: 60 MMHG | HEIGHT: 61 IN | HEART RATE: 76 BPM | WEIGHT: 118 LBS | SYSTOLIC BLOOD PRESSURE: 110 MMHG | BODY MASS INDEX: 22.28 KG/M2

## 2021-05-28 LAB — GLUCOSE BLDC GLUCOMTR-MCNC: 270

## 2021-05-28 PROCEDURE — 99215 OFFICE O/P EST HI 40 MIN: CPT | Mod: 25

## 2021-05-28 PROCEDURE — 82962 GLUCOSE BLOOD TEST: CPT

## 2021-06-03 ENCOUNTER — APPOINTMENT (OUTPATIENT)
Dept: INTERNAL MEDICINE | Facility: CLINIC | Age: 71
End: 2021-06-03
Payer: MEDICARE

## 2021-06-03 VITALS
BODY MASS INDEX: 22.66 KG/M2 | HEART RATE: 70 BPM | SYSTOLIC BLOOD PRESSURE: 114 MMHG | DIASTOLIC BLOOD PRESSURE: 52 MMHG | TEMPERATURE: 96.7 F | WEIGHT: 120 LBS | OXYGEN SATURATION: 98 % | HEIGHT: 61 IN | RESPIRATION RATE: 14 BRPM

## 2021-06-03 PROCEDURE — 99213 OFFICE O/P EST LOW 20 MIN: CPT

## 2021-06-03 NOTE — PHYSICAL EXAM
[No Acute Distress] : no acute distress [Well-Appearing] : well-appearing [Normal Voice/Communication] : normal voice/communication [No Respiratory Distress] : no respiratory distress  [No Accessory Muscle Use] : no accessory muscle use [Clear to Auscultation] : lungs were clear to auscultation bilaterally [Normal Rate] : normal rate  [Regular Rhythm] : with a regular rhythm [No Murmur] : no murmur heard [Soft] : abdomen soft [Non Tender] : non-tender [Normal Bowel Sounds] : normal bowel sounds [No CVA Tenderness] : no CVA  tenderness [Alert and Oriented x3] : oriented to person, place, and time

## 2021-06-03 NOTE — HISTORY OF PRESENT ILLNESS
[FreeTextEntry8] : Pt here for abnormal UA results from endo office. ?Shows few yeast cells. Pt denies burning with urination, maybe some frequent urination. No blood in urine, no flank pain, no fever/chills. No vaginal discharge, itching.

## 2021-06-14 NOTE — ASSESSMENT
[FreeTextEntry1] : 71 y/o female with Type 1 DM, Hypothyroidism, and Hyperlipidemia. \par \par Plan: \par Type 1 DM: suboptimal control  \par  some low BS  reviewd how to obtain carb  amutn for foods\par  pt downloaded the [place]  arcelia\par \par reduce Carb ratio   12AM 10>>15\par 4PM 22>>25\par  IOB 6hr>>4 hr \par \par change sensitivieites  \par  12 AM  55>>65\par 4AM 60>>65\par 12PM 60>>65\par 9pm 55>>65 \par at tiems \par  some higher readign  due to site issues\par  lat 3-4 days numerbs are much better \par  has been trying lower carb items  in diet  \par  makes sure she is accurately counting carbs -\par   \par - educated on healthy food choices\par - encouraged to continue routine exercise\par \par \par \par \par Hypothyroidism: Cont Lt4  125 mcg  1 tab mon thru SAt 1/2 on Sun - check repaet  labs \par - repeat TFTs in 4 weeks\par \par occ dyspena   see PMD \par \par  Grave sopthalmopathy  For Tepezza \par \par \par Hyperlipidemia: monitor labs, cont  praluent   see caridology \par \par Labs and follow up visit in 3 months. \par \par This patient with diabetes (E10.9) performs 4 glucose checks per day utilizing a home blood glucose monitor. The patient is treated with insulin via a subcutaneous insulin infusion pump. This patient requires frequent adjustments to their insulin treatment on the basis of blood glucose monitoring results via adjusting pump settings. In addition, the patient has been to our office for an evaluation of their diabetes control within the past 6 months. \par \par

## 2021-06-14 NOTE — HISTORY OF PRESENT ILLNESS
[FreeTextEntry1] : Quality: Type 1 DM\par Severity: moderate\par Duration: 48 years\par Onset: legs cramps\par Modifying Factors: Better with insulin \par Associated Symptoms:\par \par Current Regimen:\par Novolog via Medtronic pump \par \par - Levothyroxine 0.125 mg 1 tab  mon thru sat  1/2 on SUn\par has Graves opthalmopathy  ++ TSI \par  awaiting TEpezza \par \par Self blood sugar monitorin-10 times a day, per Medtronic pump download - high blood sugar at bedtime and over night, \par some low  BS at tdifferent tiems - pt has been over bolusing  and not counting carbs correctly \par \par  \par \par Diet:  trying keto diet - keto ice cream \par B- dry cereal, oatmeal, toast -low carb bread\par L- salad, fish, \par D- fish with vegetable, starch, no too much pasta\par Snacks- fruit, sugar free Jello, thin sugar free cookies\par \par Date of last eye exam:  (-) diabetic retinopathy \par Date of last foot exam: 2019\par Date of last flu vaccine: 2018\par Date of last Pneumovax: 5 years ago\par \par Saw NEuro- No TIA \par \par had MRI of head to see NEuro \par still waiting to do TX for graves opthalmopathy \par has been on hold \par \par currenlty on Coq10  Myrbetriq Doxycycline \par xyzal  MVI \par solifenacin as well as other med \par \par Using Fresestyle rcistina- \par

## 2021-06-14 NOTE — PHYSICAL EXAM
[Alert] : alert [No Acute Distress] : no acute distress [Well Nourished] : well nourished [Well Developed] : well developed [Normal Sclera/Conjunctiva] : normal sclera/conjunctiva [EOMI] : extra ocular movement intact [No Proptosis] : no proptosis [Thyroid Not Enlarged] : the thyroid was not enlarged [No Thyroid Nodules] : no palpable thyroid nodules [No Respiratory Distress] : no respiratory distress [No Accessory Muscle Use] : no accessory muscle use [Clear to Auscultation] : lungs were clear to auscultation bilaterally [Normal S1, S2] : normal S1 and S2 [Normal Rate] : heart rate was normal [Regular Rhythm] : with a regular rhythm [No Edema] : no peripheral edema [Pedal Pulses Normal] : the pedal pulses are present [Normal Anterior Cervical Nodes] : no anterior cervical lymphadenopathy [No Stigmata of Cushings Syndrome] : no stigmata of Cushings Syndrome [Normal Gait] : normal gait [Normal Strength/Tone] : muscle strength and tone were normal [No Rash] : no rash [Acanthosis Nigricans] : no acanthosis nigricans [Normal] : normal [Full ROM] : with full range of motion [Diminished Throughout Both Feet] : normal tactile sensation with monofilament testing throughout both feet [Normal Reflexes] : deep tendon reflexes were 2+ and symmetric [No Tremors] : no tremors [Oriented x3] : oriented to person, place, and time

## 2021-06-14 NOTE — REVIEW OF SYSTEMS
[Visual Field Defect] : no visual field defect [Negative] : Heme/Lymph [FreeTextEntry3] : some trouble with reading  wearing an eye patch  [FreeTextEntry6] : occ SOB with  activity - walking

## 2021-06-24 ENCOUNTER — APPOINTMENT (OUTPATIENT)
Dept: ENDOCRINOLOGY | Facility: CLINIC | Age: 71
End: 2021-06-24
Payer: MEDICARE

## 2021-06-24 PROCEDURE — 97803 MED NUTRITION INDIV SUBSEQ: CPT

## 2021-07-20 ENCOUNTER — NON-APPOINTMENT (OUTPATIENT)
Age: 71
End: 2021-07-20

## 2021-07-23 ENCOUNTER — APPOINTMENT (OUTPATIENT)
Dept: ENDOCRINOLOGY | Facility: CLINIC | Age: 71
End: 2021-07-23
Payer: MEDICARE

## 2021-07-23 ENCOUNTER — EMERGENCY (EMERGENCY)
Facility: HOSPITAL | Age: 71
LOS: 1 days | Discharge: HOME | End: 2021-07-23
Attending: INTERNAL MEDICINE | Admitting: INTERNAL MEDICINE
Payer: MEDICARE

## 2021-07-23 ENCOUNTER — NON-APPOINTMENT (OUTPATIENT)
Age: 71
End: 2021-07-23

## 2021-07-23 VITALS
HEART RATE: 97 BPM | HEIGHT: 61 IN | DIASTOLIC BLOOD PRESSURE: 72 MMHG | TEMPERATURE: 98 F | RESPIRATION RATE: 16 BRPM | OXYGEN SATURATION: 98 % | SYSTOLIC BLOOD PRESSURE: 136 MMHG | WEIGHT: 115.08 LBS

## 2021-07-23 DIAGNOSIS — Z98.890 OTHER SPECIFIED POSTPROCEDURAL STATES: Chronic | ICD-10-CM

## 2021-07-23 LAB
ACETONE SERPL-MCNC: NEGATIVE — SIGNIFICANT CHANGE UP
ALBUMIN SERPL ELPH-MCNC: 3.3 G/DL — SIGNIFICANT CHANGE UP (ref 3.3–5)
ALP SERPL-CCNC: 82 U/L — SIGNIFICANT CHANGE UP (ref 40–120)
ALT FLD-CCNC: 28 U/L — SIGNIFICANT CHANGE UP (ref 12–78)
ANION GAP SERPL CALC-SCNC: 5 MMOL/L — SIGNIFICANT CHANGE UP (ref 5–17)
AST SERPL-CCNC: 18 U/L — SIGNIFICANT CHANGE UP (ref 15–37)
BASOPHILS # BLD AUTO: 0.04 K/UL — SIGNIFICANT CHANGE UP (ref 0–0.2)
BASOPHILS NFR BLD AUTO: 0.4 % — SIGNIFICANT CHANGE UP (ref 0–2)
BILIRUB SERPL-MCNC: 0.3 MG/DL — SIGNIFICANT CHANGE UP (ref 0.2–1.2)
BUN SERPL-MCNC: 34 MG/DL — HIGH (ref 7–23)
CALCIUM SERPL-MCNC: 9.4 MG/DL — SIGNIFICANT CHANGE UP (ref 8.5–10.1)
CHLORIDE SERPL-SCNC: 98 MMOL/L — SIGNIFICANT CHANGE UP (ref 96–108)
CO2 SERPL-SCNC: 34 MMOL/L — HIGH (ref 22–31)
CREAT SERPL-MCNC: 0.96 MG/DL — SIGNIFICANT CHANGE UP (ref 0.5–1.3)
EOSINOPHIL # BLD AUTO: 0.07 K/UL — SIGNIFICANT CHANGE UP (ref 0–0.5)
EOSINOPHIL NFR BLD AUTO: 0.7 % — SIGNIFICANT CHANGE UP (ref 0–6)
GLUCOSE SERPL-MCNC: 205 MG/DL — HIGH (ref 70–99)
HCT VFR BLD CALC: 32.9 % — LOW (ref 34.5–45)
HGB BLD-MCNC: 11.6 G/DL — SIGNIFICANT CHANGE UP (ref 11.5–15.5)
IMM GRANULOCYTES NFR BLD AUTO: 0.2 % — SIGNIFICANT CHANGE UP (ref 0–1.5)
LYMPHOCYTES # BLD AUTO: 28.9 % — SIGNIFICANT CHANGE UP (ref 13–44)
LYMPHOCYTES # BLD AUTO: 3.04 K/UL — SIGNIFICANT CHANGE UP (ref 1–3.3)
MCHC RBC-ENTMCNC: 32.2 PG — SIGNIFICANT CHANGE UP (ref 27–34)
MCHC RBC-ENTMCNC: 35.3 GM/DL — SIGNIFICANT CHANGE UP (ref 32–36)
MCV RBC AUTO: 91.4 FL — SIGNIFICANT CHANGE UP (ref 80–100)
MONOCYTES # BLD AUTO: 1.06 K/UL — HIGH (ref 0–0.9)
MONOCYTES NFR BLD AUTO: 10.1 % — SIGNIFICANT CHANGE UP (ref 2–14)
NEUTROPHILS # BLD AUTO: 6.29 K/UL — SIGNIFICANT CHANGE UP (ref 1.8–7.4)
NEUTROPHILS NFR BLD AUTO: 59.7 % — SIGNIFICANT CHANGE UP (ref 43–77)
NRBC # BLD: 0 /100 WBCS — SIGNIFICANT CHANGE UP (ref 0–0)
PLATELET # BLD AUTO: 156 K/UL — SIGNIFICANT CHANGE UP (ref 150–400)
POTASSIUM SERPL-MCNC: 4.8 MMOL/L — SIGNIFICANT CHANGE UP (ref 3.5–5.3)
POTASSIUM SERPL-SCNC: 4.8 MMOL/L — SIGNIFICANT CHANGE UP (ref 3.5–5.3)
PROT SERPL-MCNC: 6.5 G/DL — SIGNIFICANT CHANGE UP (ref 6–8.3)
RBC # BLD: 3.6 M/UL — LOW (ref 3.8–5.2)
RBC # FLD: 14.3 % — SIGNIFICANT CHANGE UP (ref 10.3–14.5)
SODIUM SERPL-SCNC: 137 MMOL/L — SIGNIFICANT CHANGE UP (ref 135–145)
WBC # BLD: 10.52 K/UL — HIGH (ref 3.8–10.5)
WBC # FLD AUTO: 10.52 K/UL — HIGH (ref 3.8–10.5)

## 2021-07-23 PROCEDURE — 99284 EMERGENCY DEPT VISIT MOD MDM: CPT

## 2021-07-23 PROCEDURE — 71045 X-RAY EXAM CHEST 1 VIEW: CPT | Mod: 26

## 2021-07-23 PROCEDURE — ZZZZZ: CPT

## 2021-07-23 RX ORDER — SODIUM CHLORIDE 9 MG/ML
1000 INJECTION INTRAMUSCULAR; INTRAVENOUS; SUBCUTANEOUS ONCE
Refills: 0 | Status: COMPLETED | OUTPATIENT
Start: 2021-07-23 | End: 2021-07-23

## 2021-07-23 RX ADMIN — SODIUM CHLORIDE 1000 MILLILITER(S): 9 INJECTION INTRAMUSCULAR; INTRAVENOUS; SUBCUTANEOUS at 22:40

## 2021-07-23 RX ADMIN — SODIUM CHLORIDE 1000 MILLILITER(S): 9 INJECTION INTRAMUSCULAR; INTRAVENOUS; SUBCUTANEOUS at 23:40

## 2021-07-23 NOTE — ED PROVIDER NOTE - PATIENT PORTAL LINK FT
You can access the FollowMyHealth Patient Portal offered by [facility] by registering at the following website: [email or web address]. By joining [employer]’s FollowMyHealth portal, you will also be able to view your health information using other applications (apps) compatible with our system.

## 2021-07-23 NOTE — ED ADULT NURSE NOTE - OBJECTIVE STATEMENT
Patient came in to ED sent by her Endocrinologist for high blood sugar and confusion. As per boyfriend who is at the bedside patient at times is confused/ having hard time organizing her thoughts that started several weeks ago. Denies fever/ chills.

## 2021-07-23 NOTE — ED PROVIDER NOTE - OBJECTIVE STATEMENT
Pt is a 71 female with pmhx of PMH DM1 on insulin pump, HTN, Hypothyroidism (had graves disease in the past), HLD sent in by endocrinologist for elevated glucose. Pt states her insulin pump has not been working for about 1 week sugar was 600 at 11 am and gave herself insulin. Pt is in process of getting new pump. Pt denies any nvd abdominal pain cough chest pain. Pt states she does feel dizzy. Pt boyfriend noted pt has been more confused for last few months and worsening last few days not sure if related to elevated glucose. no focal weakness no slurring of speech pt has blurry vision in right eye and wears patch.

## 2021-07-23 NOTE — ED PROVIDER NOTE - PROGRESS NOTE DETAILS
patient  and friend agreed to go home, labs and CT were WNL, we offered to observe over night and consult neurology but the patient feeling up to going home, noted incidental UTI and will rx with AB . may be contributing to how she was feeling earlier

## 2021-07-23 NOTE — ED PROVIDER NOTE - NSCAREINITIATED _GEN_ER

## 2021-07-23 NOTE — ED PROVIDER NOTE - CARE PROVIDER_API CALL
Cathleen Marina (DO)  Internal Medicine  [address] [place] [address]  Phone: [phone]  Fax: [phone]  Follow Up Time: 1-3 Days

## 2021-07-23 NOTE — ED PROVIDER NOTE - ATTENDING CONTRIBUTION TO CARE
. Pt is a 71 female with pmhx of PMH DM1 on insulin pump, HTN, Hypothyroidism (had graves disease in the past), HLD sent in by endocrinologist for elevated glucose. Pt states her insulin pump has not been working for about 1 week sugar was 600 at 11 am and gave herself insulin. Pt is in process of getting new pump. Pt denies any nvd abdominal pain cough chest pain. Pt states she does feel dizzy. Pt boyfriend noted pt has been more confused for last few months and worsening last few days not sure if related to elevated glucose. no focal weakness no slurring of speech pt has blurry vision in right eye and wears patchVSS heent nl neck sup heart rrr s1s2 lungs clear abd ssoft nt ext normal neuro non focal  plan patient  and friend agreed to go home, labs and CT were WNL, we offered to observe over night and consult neurology but the patient feeling up to going home, noted incidental UTI and will rx with AB . may be contributing to how she was feeling earlier

## 2021-07-23 NOTE — ED ADULT TRIAGE NOTE - ACCOMPANIED BY
Spouse/Significant other Complex Repair And A-T Advancement Flap Text: The defect edges were debeveled with a #15 scalpel blade.  The primary defect was closed partially with a complex linear closure.  Given the location of the remaining defect, shape of the defect and the proximity to free margins an A-T advancement flap was deemed most appropriate for complete closure of the defect.  Using a sterile surgical marker, an appropriate advancement flap was drawn incorporating the defect and placing the expected incisions within the relaxed skin tension lines where possible.    The area thus outlined was incised deep to adipose tissue with a #15 scalpel blade.  The skin margins were undermined to an appropriate distance in all directions utilizing iris scissors.

## 2021-07-24 VITALS
RESPIRATION RATE: 16 BRPM | DIASTOLIC BLOOD PRESSURE: 84 MMHG | SYSTOLIC BLOOD PRESSURE: 158 MMHG | TEMPERATURE: 98 F | HEART RATE: 56 BPM | OXYGEN SATURATION: 99 %

## 2021-07-24 LAB
APPEARANCE UR: ABNORMAL
BILIRUB UR-MCNC: NEGATIVE — SIGNIFICANT CHANGE UP
COLOR SPEC: YELLOW — SIGNIFICANT CHANGE UP
DIFF PNL FLD: ABNORMAL
GLUCOSE UR QL: 1000 MG/DL
KETONES UR-MCNC: ABNORMAL
LEUKOCYTE ESTERASE UR-ACNC: ABNORMAL
NITRITE UR-MCNC: NEGATIVE — SIGNIFICANT CHANGE UP
PH UR: 6 — SIGNIFICANT CHANGE UP (ref 5–8)
PROT UR-MCNC: 15
SP GR SPEC: 1.01 — SIGNIFICANT CHANGE UP (ref 1.01–1.02)
UROBILINOGEN FLD QL: NEGATIVE — SIGNIFICANT CHANGE UP

## 2021-07-24 PROCEDURE — 82009 KETONE BODYS QUAL: CPT

## 2021-07-24 PROCEDURE — 96361 HYDRATE IV INFUSION ADD-ON: CPT

## 2021-07-24 PROCEDURE — 85025 COMPLETE CBC W/AUTO DIFF WBC: CPT

## 2021-07-24 PROCEDURE — 96360 HYDRATION IV INFUSION INIT: CPT

## 2021-07-24 PROCEDURE — 80053 COMPREHEN METABOLIC PANEL: CPT

## 2021-07-24 PROCEDURE — 71045 X-RAY EXAM CHEST 1 VIEW: CPT

## 2021-07-24 PROCEDURE — 99284 EMERGENCY DEPT VISIT MOD MDM: CPT | Mod: 25

## 2021-07-24 PROCEDURE — 81001 URINALYSIS AUTO W/SCOPE: CPT

## 2021-07-24 PROCEDURE — 36415 COLL VENOUS BLD VENIPUNCTURE: CPT

## 2021-07-24 PROCEDURE — 82962 GLUCOSE BLOOD TEST: CPT

## 2021-07-24 PROCEDURE — 70450 CT HEAD/BRAIN W/O DYE: CPT | Mod: 26,MA

## 2021-07-24 PROCEDURE — 70450 CT HEAD/BRAIN W/O DYE: CPT | Mod: MA

## 2021-07-24 RX ORDER — CEFUROXIME AXETIL 250 MG
500 TABLET ORAL ONCE
Refills: 0 | Status: COMPLETED | OUTPATIENT
Start: 2021-07-24 | End: 2021-07-24

## 2021-07-24 RX ORDER — CEFUROXIME AXETIL 250 MG
1 TABLET ORAL
Qty: 20 | Refills: 0
Start: 2021-07-24 | End: 2021-08-02

## 2021-07-24 RX ORDER — SODIUM CHLORIDE 9 MG/ML
1000 INJECTION INTRAMUSCULAR; INTRAVENOUS; SUBCUTANEOUS ONCE
Refills: 0 | Status: COMPLETED | OUTPATIENT
Start: 2021-07-24 | End: 2021-07-24

## 2021-07-24 RX ADMIN — Medication 500 MILLIGRAM(S): at 01:09

## 2021-07-24 RX ADMIN — SODIUM CHLORIDE 1000 MILLILITER(S): 9 INJECTION INTRAMUSCULAR; INTRAVENOUS; SUBCUTANEOUS at 01:18

## 2021-07-24 RX ADMIN — SODIUM CHLORIDE 1000 MILLILITER(S): 9 INJECTION INTRAMUSCULAR; INTRAVENOUS; SUBCUTANEOUS at 00:21

## 2021-07-28 ENCOUNTER — EMERGENCY (EMERGENCY)
Facility: HOSPITAL | Age: 71
LOS: 1 days | Discharge: HOME | End: 2021-07-28
Attending: EMERGENCY MEDICINE | Admitting: EMERGENCY MEDICINE
Payer: MEDICARE

## 2021-07-28 ENCOUNTER — NON-APPOINTMENT (OUTPATIENT)
Age: 71
End: 2021-07-28

## 2021-07-28 VITALS
DIASTOLIC BLOOD PRESSURE: 68 MMHG | TEMPERATURE: 98 F | SYSTOLIC BLOOD PRESSURE: 150 MMHG | OXYGEN SATURATION: 99 % | RESPIRATION RATE: 18 BRPM | HEART RATE: 62 BPM

## 2021-07-28 VITALS
SYSTOLIC BLOOD PRESSURE: 129 MMHG | HEIGHT: 61 IN | OXYGEN SATURATION: 99 % | RESPIRATION RATE: 16 BRPM | WEIGHT: 111.99 LBS | TEMPERATURE: 98 F | DIASTOLIC BLOOD PRESSURE: 62 MMHG | HEART RATE: 74 BPM

## 2021-07-28 DIAGNOSIS — Z98.890 OTHER SPECIFIED POSTPROCEDURAL STATES: Chronic | ICD-10-CM

## 2021-07-28 LAB
ALBUMIN SERPL ELPH-MCNC: 3.3 G/DL — SIGNIFICANT CHANGE UP (ref 3.3–5)
ALP SERPL-CCNC: 62 U/L — SIGNIFICANT CHANGE UP (ref 40–120)
ALT FLD-CCNC: 32 U/L — SIGNIFICANT CHANGE UP (ref 12–78)
ANION GAP SERPL CALC-SCNC: 6 MMOL/L — SIGNIFICANT CHANGE UP (ref 5–17)
AST SERPL-CCNC: 20 U/L — SIGNIFICANT CHANGE UP (ref 15–37)
BASOPHILS # BLD AUTO: 0.04 K/UL — SIGNIFICANT CHANGE UP (ref 0–0.2)
BASOPHILS NFR BLD AUTO: 0.4 % — SIGNIFICANT CHANGE UP (ref 0–2)
BILIRUB SERPL-MCNC: 0.4 MG/DL — SIGNIFICANT CHANGE UP (ref 0.2–1.2)
BUN SERPL-MCNC: 41 MG/DL — HIGH (ref 7–23)
CALCIUM SERPL-MCNC: 9.4 MG/DL — SIGNIFICANT CHANGE UP (ref 8.5–10.1)
CHLORIDE SERPL-SCNC: 100 MMOL/L — SIGNIFICANT CHANGE UP (ref 96–108)
CO2 SERPL-SCNC: 33 MMOL/L — HIGH (ref 22–31)
CREAT SERPL-MCNC: 1.3 MG/DL — SIGNIFICANT CHANGE UP (ref 0.5–1.3)
EOSINOPHIL # BLD AUTO: 0.04 K/UL — SIGNIFICANT CHANGE UP (ref 0–0.5)
EOSINOPHIL NFR BLD AUTO: 0.4 % — SIGNIFICANT CHANGE UP (ref 0–6)
GLUCOSE SERPL-MCNC: 129 MG/DL — HIGH (ref 70–99)
HCT VFR BLD CALC: 35 % — SIGNIFICANT CHANGE UP (ref 34.5–45)
HGB BLD-MCNC: 12.3 G/DL — SIGNIFICANT CHANGE UP (ref 11.5–15.5)
IMM GRANULOCYTES NFR BLD AUTO: 0.4 % — SIGNIFICANT CHANGE UP (ref 0–1.5)
LYMPHOCYTES # BLD AUTO: 2.57 K/UL — SIGNIFICANT CHANGE UP (ref 1–3.3)
LYMPHOCYTES # BLD AUTO: 24.5 % — SIGNIFICANT CHANGE UP (ref 13–44)
MCHC RBC-ENTMCNC: 31.9 PG — SIGNIFICANT CHANGE UP (ref 27–34)
MCHC RBC-ENTMCNC: 35.1 GM/DL — SIGNIFICANT CHANGE UP (ref 32–36)
MCV RBC AUTO: 90.7 FL — SIGNIFICANT CHANGE UP (ref 80–100)
MONOCYTES # BLD AUTO: 1.28 K/UL — HIGH (ref 0–0.9)
MONOCYTES NFR BLD AUTO: 12.2 % — SIGNIFICANT CHANGE UP (ref 2–14)
NEUTROPHILS # BLD AUTO: 6.51 K/UL — SIGNIFICANT CHANGE UP (ref 1.8–7.4)
NEUTROPHILS NFR BLD AUTO: 62.1 % — SIGNIFICANT CHANGE UP (ref 43–77)
NRBC # BLD: 0 /100 WBCS — SIGNIFICANT CHANGE UP (ref 0–0)
PLATELET # BLD AUTO: 162 K/UL — SIGNIFICANT CHANGE UP (ref 150–400)
POTASSIUM SERPL-MCNC: 4.2 MMOL/L — SIGNIFICANT CHANGE UP (ref 3.5–5.3)
POTASSIUM SERPL-SCNC: 4.2 MMOL/L — SIGNIFICANT CHANGE UP (ref 3.5–5.3)
PROT SERPL-MCNC: 6.6 G/DL — SIGNIFICANT CHANGE UP (ref 6–8.3)
RBC # BLD: 3.86 M/UL — SIGNIFICANT CHANGE UP (ref 3.8–5.2)
RBC # FLD: 14.5 % — SIGNIFICANT CHANGE UP (ref 10.3–14.5)
SODIUM SERPL-SCNC: 139 MMOL/L — SIGNIFICANT CHANGE UP (ref 135–145)
TROPONIN I SERPL-MCNC: <.015 NG/ML — SIGNIFICANT CHANGE UP (ref 0.01–0.04)
WBC # BLD: 10.48 K/UL — SIGNIFICANT CHANGE UP (ref 3.8–10.5)
WBC # FLD AUTO: 10.48 K/UL — SIGNIFICANT CHANGE UP (ref 3.8–10.5)

## 2021-07-28 PROCEDURE — 84484 ASSAY OF TROPONIN QUANT: CPT

## 2021-07-28 PROCEDURE — 80053 COMPREHEN METABOLIC PANEL: CPT

## 2021-07-28 PROCEDURE — 36415 COLL VENOUS BLD VENIPUNCTURE: CPT

## 2021-07-28 PROCEDURE — G1004: CPT

## 2021-07-28 PROCEDURE — 70450 CT HEAD/BRAIN W/O DYE: CPT | Mod: ME

## 2021-07-28 PROCEDURE — 93005 ELECTROCARDIOGRAM TRACING: CPT

## 2021-07-28 PROCEDURE — 85025 COMPLETE CBC W/AUTO DIFF WBC: CPT

## 2021-07-28 PROCEDURE — 93010 ELECTROCARDIOGRAM REPORT: CPT

## 2021-07-28 PROCEDURE — 93010 ELECTROCARDIOGRAM REPORT: CPT | Mod: 77

## 2021-07-28 PROCEDURE — 82962 GLUCOSE BLOOD TEST: CPT

## 2021-07-28 PROCEDURE — 99285 EMERGENCY DEPT VISIT HI MDM: CPT

## 2021-07-28 PROCEDURE — 99284 EMERGENCY DEPT VISIT MOD MDM: CPT | Mod: 25

## 2021-07-28 PROCEDURE — 70450 CT HEAD/BRAIN W/O DYE: CPT | Mod: 26,ME

## 2021-07-28 RX ORDER — SYRING-NEEDL,DISP,INSUL,0.3 ML 31 GX5/16"
31G X 5/16" SYRINGE, EMPTY DISPOSABLE MISCELLANEOUS
Qty: 400 | Refills: 1 | Status: ACTIVE | COMMUNITY
Start: 2021-07-28 | End: 1900-01-01

## 2021-07-28 RX ORDER — NEEDLES, SAFETY 22GX1 1/2"
31G X 5/16" NEEDLE, DISPOSABLE MISCELLANEOUS
Qty: 4 | Refills: 1 | Status: DISCONTINUED | COMMUNITY
Start: 2021-07-28 | End: 2021-07-28

## 2021-07-28 NOTE — ED ADULT NURSE REASSESSMENT NOTE - NS ED NURSE REASSESS COMMENT FT1
Patient ambulated as per MD Calderon with ED tech at side with no complaints of dizziness. MD Calderon made aware.

## 2021-07-28 NOTE — ED PROVIDER NOTE - CARE PROVIDER_API CALL
Evens Kaufman)  Cardiovascular Disease  [address] [place] [address]  Phone: [phone]  Fax: [phone]  Follow Up Time:

## 2021-07-28 NOTE — ED PROVIDER NOTE - PROGRESS NOTE DETAILS
pt reevaluted, feeling better, pt ambulated and does not feel dizzy or lightheaded, pt to be d/c home follow up with dr beck, return if any symtpoms worsen

## 2021-07-28 NOTE — ED PROVIDER NOTE - PATIENT PORTAL LINK FT
You can access the FollowMyHealth Patient Portal offered by [facility] by registering at the following website: [email or web address]. By joining [email or web address]’s FollowMyHealth portal, you will also be able to view your health information using other applications (apps) compatible with our system.

## 2021-07-28 NOTE — ED ADULT TRIAGE NOTE - CHIEF COMPLAINT QUOTE
syncopal episode at home, alert and oriented x4. Thinks she hit her head, denies blood thinners. Pt reports headache. BP 90/50 per EMS, IV fluids started by EMS

## 2021-07-28 NOTE — ED ADULT NURSE NOTE - NSIMPLEMENTINTERV_GEN_ALL_ED
Implemented All Fall with Harm Risk Interventions:  [place] to call system. Call bell, personal items and telephone within reach. Instruct patient to call for assistance. Room bathroom lighting operational. Non-slip footwear when patient is off stretcher. Physically safe environment: no spills, clutter or unnecessary equipment. Stretcher in lowest position, wheels locked, appropriate side rails in place. Provide visual cue, wrist band, yellow gown, etc. Monitor gait and stability. Monitor for mental status changes and reorient to person, place, and time. Review medications for side effects contributing to fall risk. Reinforce activity limits and safety measures with patient and family. Provide visual clues: red socks.

## 2021-07-28 NOTE — OCCUPATIONAL THERAPY INITIAL EVALUATION ADULT - GENERAL OBSERVATIONS, REHAB EVAL
Patient's wife is looking for help with her .  She is looking for some kind of support / education about how to handle her 's parkinson's disease.  Patient wife's feels this would be beneficial for both her and her .        Best to call wife about this situation.     Pt received in bathroom completing morning ADLs; NAD.

## 2021-07-28 NOTE — ED PROVIDER NOTE - OBJECTIVE STATEMENT
70yo female bib ems s/p syncopal episode. pt states she was sitting at her kitchen table checking her sugar and she passed out, woke up on the floor, no dizziness or chest pain, +headache, pt was able to ambulate after incidient, no other complaints

## 2021-07-28 NOTE — ED ADULT NURSE NOTE - OBJECTIVE STATEMENT
72 y/o F patient presents to ED from home via EMS c/o syncopal episode tonight. 70 y/o F patient presents to ED from home via EMS c/o syncopal episode tonight. As per patient she was sitting at table checking her diabetes medications when she passed out and woke up on the floor. Patient reports her brother was bedside and called EMS. Patient reports she is unaware if she hit her head but felt HA shortly after. Patient A&Ox4. bruising noted to left lateral thigh. Patient denies dizziness, SOB, chest pain, abdominal pain, N/V/D. Safety and comfort measures provided and maintained. Brother bedside.

## 2021-07-29 ENCOUNTER — APPOINTMENT (OUTPATIENT)
Dept: INTERNAL MEDICINE | Facility: CLINIC | Age: 71
End: 2021-07-29

## 2021-08-01 ENCOUNTER — TRANSCRIPTION ENCOUNTER (OUTPATIENT)
Age: 71
End: 2021-08-01

## 2021-08-02 ENCOUNTER — NON-APPOINTMENT (OUTPATIENT)
Age: 71
End: 2021-08-02

## 2021-08-03 ENCOUNTER — APPOINTMENT (OUTPATIENT)
Dept: INTERNAL MEDICINE | Facility: CLINIC | Age: 71
End: 2021-08-03
Payer: MEDICARE

## 2021-08-03 ENCOUNTER — APPOINTMENT (OUTPATIENT)
Dept: NEUROLOGY | Facility: CLINIC | Age: 71
End: 2021-08-03
Payer: MEDICARE

## 2021-08-03 VITALS
DIASTOLIC BLOOD PRESSURE: 53 MMHG | HEIGHT: 61 IN | BODY MASS INDEX: 21.14 KG/M2 | TEMPERATURE: 98 F | WEIGHT: 112 LBS | HEART RATE: 68 BPM | OXYGEN SATURATION: 98 % | SYSTOLIC BLOOD PRESSURE: 147 MMHG

## 2021-08-03 VITALS
HEART RATE: 65 BPM | OXYGEN SATURATION: 98 % | RESPIRATION RATE: 14 BRPM | WEIGHT: 111 LBS | TEMPERATURE: 98 F | DIASTOLIC BLOOD PRESSURE: 60 MMHG | SYSTOLIC BLOOD PRESSURE: 114 MMHG | BODY MASS INDEX: 20.96 KG/M2 | HEIGHT: 61 IN

## 2021-08-03 DIAGNOSIS — R41.3 OTHER AMNESIA: ICD-10-CM

## 2021-08-03 PROCEDURE — 99215 OFFICE O/P EST HI 40 MIN: CPT

## 2021-08-03 PROCEDURE — 99213 OFFICE O/P EST LOW 20 MIN: CPT

## 2021-08-03 PROCEDURE — 99205 OFFICE O/P NEW HI 60 MIN: CPT

## 2021-08-03 NOTE — PHYSICAL EXAM
[No Acute Distress] : no acute distress [No Respiratory Distress] : no respiratory distress  [No Accessory Muscle Use] : no accessory muscle use [Clear to Auscultation] : lungs were clear to auscultation bilaterally [Normal Rate] : normal rate  [Regular Rhythm] : with a regular rhythm [Normal S1, S2] : normal S1 and S2 [No Edema] : there was no peripheral edema [Soft] : abdomen soft [Non Tender] : non-tender [Non-distended] : non-distended [Normal Bowel Sounds] : normal bowel sounds [de-identified] : eyepatch over right eye

## 2021-08-03 NOTE — ASSESSMENT
[FreeTextEntry1] : Depression, memory loss: Patient has bloodwork pending and MRI pending from neurology. May be complicated by poorly controlled diabetes and hypothyroidism. Dr Das is following closely to get better control. Referred to behavioral health who will help patient find psychiatrist that accepts insurance to discuss possible antipsychotic medication. \par HCM: Requested colonoscopy for review. Urged mammogram for breast ca screening. \par

## 2021-08-03 NOTE — REVIEW OF SYSTEMS
[Memory Loss] : memory loss [Depression] : depression [Fever] : no fever [Chills] : no chills [Night Sweats] : no night sweats [Chest Pain] : no chest pain [Palpitations] : no palpitations [Shortness Of Breath] : no shortness of breath [Wheezing] : no wheezing [Cough] : no cough [Dyspnea on Exertion] : no dyspnea on exertion [Abdominal Pain] : no abdominal pain [Nausea] : no nausea [Vomiting] : no vomiting [Dysuria] : no dysuria [Suicidal] : not suicidal

## 2021-08-03 NOTE — HISTORY OF PRESENT ILLNESS
[de-identified] : 71F with DM1, hypothyroidism, HLD/HTN presents as family notes she had become increasingly non compliant with diabetes medications. Notes that she has had depressed mood and paranoia and fixation on her cats. Of note, she had appointment with neurology NP who ordered MRI and labwork. BS have been elevated and Dr Garcia has been adjusting her insulin.

## 2021-08-04 ENCOUNTER — APPOINTMENT (OUTPATIENT)
Dept: ENDOCRINOLOGY | Facility: CLINIC | Age: 71
End: 2021-08-04
Payer: MEDICARE

## 2021-08-04 PROCEDURE — G0108 DIAB MANAGE TRN  PER INDIV: CPT

## 2021-08-04 RX ORDER — INSULIN GLARGINE 100 [IU]/ML
100 INJECTION, SOLUTION SUBCUTANEOUS
Qty: 1 | Refills: 5 | Status: DISCONTINUED | COMMUNITY
Start: 2018-12-05 | End: 2021-08-04

## 2021-08-05 ENCOUNTER — TRANSCRIPTION ENCOUNTER (OUTPATIENT)
Age: 71
End: 2021-08-05

## 2021-08-05 ENCOUNTER — NON-APPOINTMENT (OUTPATIENT)
Age: 71
End: 2021-08-05

## 2021-08-06 ENCOUNTER — INPATIENT (INPATIENT)
Facility: HOSPITAL | Age: 71
LOS: 5 days | Discharge: OTHER INSTITUTION | DRG: 637 | End: 2021-08-12
Attending: STUDENT IN AN ORGANIZED HEALTH CARE EDUCATION/TRAINING PROGRAM | Admitting: HOSPITALIST
Payer: MEDICARE

## 2021-08-06 ENCOUNTER — NON-APPOINTMENT (OUTPATIENT)
Age: 71
End: 2021-08-06

## 2021-08-06 ENCOUNTER — TRANSCRIPTION ENCOUNTER (OUTPATIENT)
Age: 71
End: 2021-08-06

## 2021-08-06 VITALS
DIASTOLIC BLOOD PRESSURE: 72 MMHG | TEMPERATURE: 98 F | SYSTOLIC BLOOD PRESSURE: 160 MMHG | HEART RATE: 72 BPM | OXYGEN SATURATION: 99 % | RESPIRATION RATE: 18 BRPM

## 2021-08-06 DIAGNOSIS — Z98.890 OTHER SPECIFIED POSTPROCEDURAL STATES: Chronic | ICD-10-CM

## 2021-08-06 LAB
ALBUMIN SERPL ELPH-MCNC: 3.8 G/DL — SIGNIFICANT CHANGE UP (ref 3.3–5.2)
ALP SERPL-CCNC: 52 U/L — SIGNIFICANT CHANGE UP (ref 40–120)
ALT FLD-CCNC: 21 U/L — SIGNIFICANT CHANGE UP
AMPHET UR-MCNC: NEGATIVE — SIGNIFICANT CHANGE UP
ANION GAP SERPL CALC-SCNC: 8 MMOL/L — SIGNIFICANT CHANGE UP (ref 5–17)
APPEARANCE UR: CLEAR — SIGNIFICANT CHANGE UP
AST SERPL-CCNC: 19 U/L — SIGNIFICANT CHANGE UP
BARBITURATES UR SCN-MCNC: NEGATIVE — SIGNIFICANT CHANGE UP
BASOPHILS # BLD AUTO: 0.03 K/UL — SIGNIFICANT CHANGE UP (ref 0–0.2)
BASOPHILS NFR BLD AUTO: 0.5 % — SIGNIFICANT CHANGE UP (ref 0–2)
BENZODIAZ UR-MCNC: NEGATIVE — SIGNIFICANT CHANGE UP
BILIRUB SERPL-MCNC: 0.3 MG/DL — LOW (ref 0.4–2)
BILIRUB UR-MCNC: NEGATIVE — SIGNIFICANT CHANGE UP
BUN SERPL-MCNC: 27.5 MG/DL — HIGH (ref 8–20)
CALCIUM SERPL-MCNC: 9.3 MG/DL — SIGNIFICANT CHANGE UP (ref 8.6–10.2)
CHLORIDE SERPL-SCNC: 103 MMOL/L — SIGNIFICANT CHANGE UP (ref 98–107)
CO2 SERPL-SCNC: 33 MMOL/L — HIGH (ref 22–29)
COCAINE METAB.OTHER UR-MCNC: NEGATIVE — SIGNIFICANT CHANGE UP
COLOR SPEC: YELLOW — SIGNIFICANT CHANGE UP
CREAT SERPL-MCNC: 0.64 MG/DL — SIGNIFICANT CHANGE UP (ref 0.5–1.3)
DIFF PNL FLD: ABNORMAL
EOSINOPHIL # BLD AUTO: 0.03 K/UL — SIGNIFICANT CHANGE UP (ref 0–0.5)
EOSINOPHIL NFR BLD AUTO: 0.5 % — SIGNIFICANT CHANGE UP (ref 0–6)
ETHANOL SERPL-MCNC: <10 MG/DL — SIGNIFICANT CHANGE UP (ref 0–9)
GLUCOSE SERPL-MCNC: 86 MG/DL — SIGNIFICANT CHANGE UP (ref 70–99)
GLUCOSE UR QL: 1000 MG/DL
HCT VFR BLD CALC: 35.3 % — SIGNIFICANT CHANGE UP (ref 34.5–45)
HGB BLD-MCNC: 11.8 G/DL — SIGNIFICANT CHANGE UP (ref 11.5–15.5)
IMM GRANULOCYTES NFR BLD AUTO: 0.2 % — SIGNIFICANT CHANGE UP (ref 0–1.5)
KETONES UR-MCNC: NEGATIVE — SIGNIFICANT CHANGE UP
LEUKOCYTE ESTERASE UR-ACNC: NEGATIVE — SIGNIFICANT CHANGE UP
LYMPHOCYTES # BLD AUTO: 1.9 K/UL — SIGNIFICANT CHANGE UP (ref 1–3.3)
LYMPHOCYTES # BLD AUTO: 34.2 % — SIGNIFICANT CHANGE UP (ref 13–44)
MCHC RBC-ENTMCNC: 31.9 PG — SIGNIFICANT CHANGE UP (ref 27–34)
MCHC RBC-ENTMCNC: 33.4 GM/DL — SIGNIFICANT CHANGE UP (ref 32–36)
MCV RBC AUTO: 95.4 FL — SIGNIFICANT CHANGE UP (ref 80–100)
METHADONE UR-MCNC: NEGATIVE — SIGNIFICANT CHANGE UP
MONOCYTES # BLD AUTO: 0.55 K/UL — SIGNIFICANT CHANGE UP (ref 0–0.9)
MONOCYTES NFR BLD AUTO: 9.9 % — SIGNIFICANT CHANGE UP (ref 2–14)
NEUTROPHILS # BLD AUTO: 3.03 K/UL — SIGNIFICANT CHANGE UP (ref 1.8–7.4)
NEUTROPHILS NFR BLD AUTO: 54.7 % — SIGNIFICANT CHANGE UP (ref 43–77)
NITRITE UR-MCNC: NEGATIVE — SIGNIFICANT CHANGE UP
OPIATES UR-MCNC: NEGATIVE — SIGNIFICANT CHANGE UP
PCP SPEC-MCNC: SIGNIFICANT CHANGE UP
PCP UR-MCNC: NEGATIVE — SIGNIFICANT CHANGE UP
PH UR: 6 — SIGNIFICANT CHANGE UP (ref 5–8)
PLATELET # BLD AUTO: 148 K/UL — LOW (ref 150–400)
POTASSIUM SERPL-MCNC: 4.2 MMOL/L — SIGNIFICANT CHANGE UP (ref 3.5–5.3)
POTASSIUM SERPL-SCNC: 4.2 MMOL/L — SIGNIFICANT CHANGE UP (ref 3.5–5.3)
PROT SERPL-MCNC: 6.1 G/DL — LOW (ref 6.6–8.7)
PROT UR-MCNC: 30 MG/DL
RBC # BLD: 3.7 M/UL — LOW (ref 3.8–5.2)
RBC # FLD: 14.6 % — HIGH (ref 10.3–14.5)
RBC CASTS # UR COMP ASSIST: SIGNIFICANT CHANGE UP /HPF (ref 0–4)
SODIUM SERPL-SCNC: 144 MMOL/L — SIGNIFICANT CHANGE UP (ref 135–145)
SP GR SPEC: 1.02 — SIGNIFICANT CHANGE UP (ref 1.01–1.02)
THC UR QL: NEGATIVE — SIGNIFICANT CHANGE UP
UROBILINOGEN FLD QL: NEGATIVE MG/DL — SIGNIFICANT CHANGE UP
WBC # BLD: 5.55 K/UL — SIGNIFICANT CHANGE UP (ref 3.8–10.5)
WBC # FLD AUTO: 5.55 K/UL — SIGNIFICANT CHANGE UP (ref 3.8–10.5)
WBC UR QL: NEGATIVE — SIGNIFICANT CHANGE UP

## 2021-08-06 PROCEDURE — 71045 X-RAY EXAM CHEST 1 VIEW: CPT | Mod: 26

## 2021-08-06 PROCEDURE — 99285 EMERGENCY DEPT VISIT HI MDM: CPT

## 2021-08-06 PROCEDURE — 93010 ELECTROCARDIOGRAM REPORT: CPT

## 2021-08-06 RX ORDER — INFUSION SET FOR INSULIN PUMP
INFUSION SETS-PARAPHERNALIA MISCELLANEOUS
Qty: 5 | Refills: 0 | Status: DISCONTINUED | COMMUNITY
Start: 2020-03-10 | End: 2021-08-06

## 2021-08-06 RX ORDER — FLASH GLUCOSE SCANNING READER
EACH MISCELLANEOUS
Qty: 1 | Refills: 0 | Status: DISCONTINUED | COMMUNITY
Start: 2019-06-05 | End: 2021-08-06

## 2021-08-06 RX ORDER — BLOOD SUGAR DIAGNOSTIC
STRIP MISCELLANEOUS
Qty: 600 | Refills: 1 | Status: DISCONTINUED | COMMUNITY
Start: 2019-09-27 | End: 2021-08-06

## 2021-08-06 RX ORDER — INSULIN PUMP SYRINGE, 3 ML
EACH MISCELLANEOUS
Qty: 5 | Refills: 0 | Status: DISCONTINUED | COMMUNITY
Start: 2020-03-10 | End: 2021-08-06

## 2021-08-06 RX ORDER — SODIUM CHLORIDE 9 MG/ML
1000 INJECTION INTRAMUSCULAR; INTRAVENOUS; SUBCUTANEOUS ONCE
Refills: 0 | Status: COMPLETED | OUTPATIENT
Start: 2021-08-06 | End: 2021-08-06

## 2021-08-06 RX ORDER — FLASH GLUCOSE SENSOR
KIT MISCELLANEOUS
Qty: 6 | Refills: 1 | Status: DISCONTINUED | COMMUNITY
Start: 2019-06-05 | End: 2021-08-06

## 2021-08-06 RX ADMIN — SODIUM CHLORIDE 1000 MILLILITER(S): 9 INJECTION INTRAMUSCULAR; INTRAVENOUS; SUBCUTANEOUS at 23:58

## 2021-08-06 NOTE — ED PROVIDER NOTE - NS ED ROS FT
ROS: No fever/chills. No eye pain/changes in vision, No ear pain/sore throat/dysphagia, No chest pain/palpitations. No SOB/cough/. No abdominal pain, N/V/D, no black/bloody bm. No dysuria/frequency/discharge, No headache. No Dizziness.    No rashes or breaks in skin. No numbness/tingling/weakness. +AMS
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
## 2021-08-06 NOTE — ED PROVIDER NOTE - PHYSICAL EXAMINATION
Gen: No acute distress, non toxic  HEENT: Mucous membranes moist, pink conjunctivae, EOMI, right eye patch removed with normal outward appearing eye  CV: RRR, nl s1/s2.  Resp: CTAB, normal rate and effort  GI: Abdomen soft, NT, ND. No rebound, no guarding, insulin pump in place  : No CVAT  Neuro: A&O x 3, moving all 4 extremities  MSK: No spine or joint tenderness to palpation  Skin: No rashes. intact and perfused. Gen: No acute distress, non toxic  HEENT: Mucous membranes moist, pink conjunctivae, EOMI, right eye patch removed with normal outward appearing eye  CV: RRR, nl s1/s2.  Resp: CTAB, normal rate and effort  GI: Abdomen soft, NT, ND. No rebound, no guarding, insulin pump in place  : No CVAT  Neuro: A&O x 2, thinks its 1923, follows commands, answers some questions in a way that doesn't always make sense.   MSK: No spine or joint tenderness to palpation  Skin: No rashes. intact and perfused.

## 2021-08-06 NOTE — ED PROVIDER NOTE - PROGRESS NOTE DETAILS
Tip: spoke to son Nikunj who came bedside (), states pt has insulin pump but using 24 units lantus daily and novolog, not using pump, states pt with hypothyroid. recently on cefuroxime until several days ago for uti, with some mild confusion ~1 month ago but mostly at regular mental baseline, with ~3 weeks of fairly rapid mental decline with more frequent confusion and paranoia/not making sense. Was standing on windowsill asking for her endocrine doctor. lives alone otherwise. no known psych or dementia hx. Tip: spoke to son Nikunj who came bedside (), states pt has insulin pump but using 24 units lantus daily and novolog, not using pump, states pt with hypothyroid. recently on cefuroxime until several days ago for uti, with some mild confusion ~1 month ago but mostly at regular mental baseline, with ~3 weeks of fairly rapid mental decline with more frequent confusion and paranoia/not making sense. Was standing on windowsill asking for her endocrine doctor. lives alone otherwise. no known psych or dementia hx.     pt at [facility] x[facility] recently mrn [id number], chart reviewed. Tip: pt to be admitted for further work up or ams/posible dementia/psych though no hx and over 1 month. fs mart to 58, was given food.

## 2021-08-06 NOTE — ED PROVIDER NOTE - CLINICAL SUMMARY MEDICAL DECISION MAKING FREE TEXT BOX
Pt with AMS unclear baseline, vital sign grossly within normal limites, neuro grossly intact beside AMS, check labs get collateral, reassess. Pt denies Hallucinations, SI, HI Pt with AMS unclear baseline, vital sign grossly within normal limits, neuro grossly intact beside AMS, check labs get collateral, reassess.

## 2021-08-06 NOTE — ED PROVIDER NOTE - OBJECTIVE STATEMENT
72 y/o female with PMHx of ?DM as pt has inulin pump and ?eye disease pt with eye patch, pt poor historian unable to elaborate p/w AMS per triage and EMS. Pt poor historian unable to articulate why here, pt states she lives alone states "the people don want here any more" unable to say events led up to pt coming to the ED pt on antibiotics for UTI and son alledgedly called when pt acting bizarre, unknown onset 70 y/o female with PMHx of ?DM as pt has inulin pump and ?eye disease pt with eye patch, pt poor historian unable to elaborate p/w AMS per triage and EMS. Pt poor historian unable to articulate why here, pt states she lives alone states "the people don want here any more" unable to say events led up to pt coming to the ED pt on antibiotics for UTI and son alledgedly called when pt acting bizarre, unknown onset Pt denies Hallucinations, SI, HI

## 2021-08-07 DIAGNOSIS — G93.40 ENCEPHALOPATHY, UNSPECIFIED: ICD-10-CM

## 2021-08-07 LAB
ACETONE SERPL-MCNC: NEGATIVE — SIGNIFICANT CHANGE UP
ALBUMIN SERPL ELPH-MCNC: 3.6 G/DL — SIGNIFICANT CHANGE UP (ref 3.3–5.2)
ALP SERPL-CCNC: 53 U/L — SIGNIFICANT CHANGE UP (ref 40–120)
ALT FLD-CCNC: 20 U/L — SIGNIFICANT CHANGE UP
AMMONIA BLD-MCNC: 11 UMOL/L — SIGNIFICANT CHANGE UP (ref 11–55)
ANION GAP SERPL CALC-SCNC: 11 MMOL/L — SIGNIFICANT CHANGE UP (ref 5–17)
AST SERPL-CCNC: 21 U/L — SIGNIFICANT CHANGE UP
BASOPHILS # BLD AUTO: 0.03 K/UL — SIGNIFICANT CHANGE UP (ref 0–0.2)
BASOPHILS NFR BLD AUTO: 0.6 % — SIGNIFICANT CHANGE UP (ref 0–2)
BILIRUB SERPL-MCNC: 0.3 MG/DL — LOW (ref 0.4–2)
BUN SERPL-MCNC: 15.9 MG/DL — SIGNIFICANT CHANGE UP (ref 8–20)
CALCIUM SERPL-MCNC: 8.7 MG/DL — SIGNIFICANT CHANGE UP (ref 8.6–10.2)
CHLORIDE SERPL-SCNC: 101 MMOL/L — SIGNIFICANT CHANGE UP (ref 98–107)
CK SERPL-CCNC: 64 U/L — SIGNIFICANT CHANGE UP (ref 25–170)
CO2 SERPL-SCNC: 31 MMOL/L — HIGH (ref 22–29)
CREAT SERPL-MCNC: 0.59 MG/DL — SIGNIFICANT CHANGE UP (ref 0.5–1.3)
CULTURE RESULTS: SIGNIFICANT CHANGE UP
EOSINOPHIL # BLD AUTO: 0.04 K/UL — SIGNIFICANT CHANGE UP (ref 0–0.5)
EOSINOPHIL NFR BLD AUTO: 0.8 % — SIGNIFICANT CHANGE UP (ref 0–6)
ETHANOL SERPL-MCNC: <10 MG/DL — SIGNIFICANT CHANGE UP (ref 0–9)
FOLATE SERPL-MCNC: 17.1 NG/ML — SIGNIFICANT CHANGE UP
GLUCOSE BLDC GLUCOMTR-MCNC: 129 MG/DL — HIGH (ref 70–99)
GLUCOSE BLDC GLUCOMTR-MCNC: 151 MG/DL — HIGH (ref 70–99)
GLUCOSE BLDC GLUCOMTR-MCNC: 177 MG/DL — HIGH (ref 70–99)
GLUCOSE BLDC GLUCOMTR-MCNC: 181 MG/DL — HIGH (ref 70–99)
GLUCOSE BLDC GLUCOMTR-MCNC: 361 MG/DL — HIGH (ref 70–99)
GLUCOSE SERPL-MCNC: 143 MG/DL — HIGH (ref 70–99)
HCT VFR BLD CALC: 36.1 % — SIGNIFICANT CHANGE UP (ref 34.5–45)
HGB BLD-MCNC: 12.1 G/DL — SIGNIFICANT CHANGE UP (ref 11.5–15.5)
IMM GRANULOCYTES NFR BLD AUTO: 0.2 % — SIGNIFICANT CHANGE UP (ref 0–1.5)
LACTATE BLDV-MCNC: 2.2 MMOL/L — HIGH (ref 0.5–2)
LIDOCAIN IGE QN: 15 U/L — LOW (ref 22–51)
LYMPHOCYTES # BLD AUTO: 1.51 K/UL — SIGNIFICANT CHANGE UP (ref 1–3.3)
LYMPHOCYTES # BLD AUTO: 30 % — SIGNIFICANT CHANGE UP (ref 13–44)
MAGNESIUM SERPL-MCNC: 2 MG/DL — SIGNIFICANT CHANGE UP (ref 1.6–2.6)
MCHC RBC-ENTMCNC: 31.8 PG — SIGNIFICANT CHANGE UP (ref 27–34)
MCHC RBC-ENTMCNC: 33.5 GM/DL — SIGNIFICANT CHANGE UP (ref 32–36)
MCV RBC AUTO: 95 FL — SIGNIFICANT CHANGE UP (ref 80–100)
MONOCYTES # BLD AUTO: 0.44 K/UL — SIGNIFICANT CHANGE UP (ref 0–0.9)
MONOCYTES NFR BLD AUTO: 8.7 % — SIGNIFICANT CHANGE UP (ref 2–14)
NEUTROPHILS # BLD AUTO: 3 K/UL — SIGNIFICANT CHANGE UP (ref 1.8–7.4)
NEUTROPHILS NFR BLD AUTO: 59.7 % — SIGNIFICANT CHANGE UP (ref 43–77)
PHOSPHATE SERPL-MCNC: 2.6 MG/DL — SIGNIFICANT CHANGE UP (ref 2.4–4.7)
PLATELET # BLD AUTO: 150 K/UL — SIGNIFICANT CHANGE UP (ref 150–400)
POTASSIUM SERPL-MCNC: 4.5 MMOL/L — SIGNIFICANT CHANGE UP (ref 3.5–5.3)
POTASSIUM SERPL-SCNC: 4.5 MMOL/L — SIGNIFICANT CHANGE UP (ref 3.5–5.3)
PROT SERPL-MCNC: 5.9 G/DL — LOW (ref 6.6–8.7)
RBC # BLD: 3.8 M/UL — SIGNIFICANT CHANGE UP (ref 3.8–5.2)
RBC # FLD: 14.5 % — SIGNIFICANT CHANGE UP (ref 10.3–14.5)
SARS-COV-2 RNA SPEC QL NAA+PROBE: SIGNIFICANT CHANGE UP
SODIUM SERPL-SCNC: 143 MMOL/L — SIGNIFICANT CHANGE UP (ref 135–145)
SPECIMEN SOURCE: SIGNIFICANT CHANGE UP
TROPONIN T SERPL-MCNC: <0.01 NG/ML — SIGNIFICANT CHANGE UP (ref 0–0.06)
TSH SERPL-MCNC: 0.64 UIU/ML — SIGNIFICANT CHANGE UP (ref 0.27–4.2)
VIT B12 SERPL-MCNC: 1462 PG/ML — HIGH (ref 232–1245)
WBC # BLD: 5.03 K/UL — SIGNIFICANT CHANGE UP (ref 3.8–10.5)
WBC # FLD AUTO: 5.03 K/UL — SIGNIFICANT CHANGE UP (ref 3.8–10.5)

## 2021-08-07 PROCEDURE — 70450 CT HEAD/BRAIN W/O DYE: CPT | Mod: 26,MG

## 2021-08-07 PROCEDURE — 99223 1ST HOSP IP/OBS HIGH 75: CPT

## 2021-08-07 PROCEDURE — 93010 ELECTROCARDIOGRAM REPORT: CPT

## 2021-08-07 PROCEDURE — G1004: CPT

## 2021-08-07 RX ORDER — INSULIN GLARGINE 100 [IU]/ML
20 INJECTION, SOLUTION SUBCUTANEOUS AT BEDTIME
Refills: 0 | Status: DISCONTINUED | OUTPATIENT
Start: 2021-08-07 | End: 2021-08-07

## 2021-08-07 RX ORDER — HEPARIN SODIUM 5000 [USP'U]/ML
5000 INJECTION INTRAVENOUS; SUBCUTANEOUS EVERY 8 HOURS
Refills: 0 | Status: DISCONTINUED | OUTPATIENT
Start: 2021-08-07 | End: 2021-08-12

## 2021-08-07 RX ORDER — LOSARTAN POTASSIUM 100 MG/1
100 TABLET, FILM COATED ORAL DAILY
Refills: 0 | Status: DISCONTINUED | OUTPATIENT
Start: 2021-08-07 | End: 2021-08-12

## 2021-08-07 RX ORDER — DEXTROSE 50 % IN WATER 50 %
25 SYRINGE (ML) INTRAVENOUS ONCE
Refills: 0 | Status: DISCONTINUED | OUTPATIENT
Start: 2021-08-07 | End: 2021-08-12

## 2021-08-07 RX ORDER — SODIUM CHLORIDE 9 MG/ML
1000 INJECTION, SOLUTION INTRAVENOUS
Refills: 0 | Status: DISCONTINUED | OUTPATIENT
Start: 2021-08-07 | End: 2021-08-12

## 2021-08-07 RX ORDER — ONDANSETRON 8 MG/1
4 TABLET, FILM COATED ORAL EVERY 8 HOURS
Refills: 0 | Status: DISCONTINUED | OUTPATIENT
Start: 2021-08-07 | End: 2021-08-12

## 2021-08-07 RX ORDER — HYDRALAZINE HCL 50 MG
10 TABLET ORAL ONCE
Refills: 0 | Status: COMPLETED | OUTPATIENT
Start: 2021-08-07 | End: 2021-08-07

## 2021-08-07 RX ORDER — LANOLIN ALCOHOL/MO/W.PET/CERES
3 CREAM (GRAM) TOPICAL AT BEDTIME
Refills: 0 | Status: DISCONTINUED | OUTPATIENT
Start: 2021-08-07 | End: 2021-08-12

## 2021-08-07 RX ORDER — ASPIRIN/CALCIUM CARB/MAGNESIUM 324 MG
81 TABLET ORAL DAILY
Refills: 0 | Status: DISCONTINUED | OUTPATIENT
Start: 2021-08-07 | End: 2021-08-12

## 2021-08-07 RX ORDER — LEVOTHYROXINE SODIUM 125 MCG
125 TABLET ORAL DAILY
Refills: 0 | Status: DISCONTINUED | OUTPATIENT
Start: 2021-08-07 | End: 2021-08-12

## 2021-08-07 RX ORDER — GLUCAGON INJECTION, SOLUTION 0.5 MG/.1ML
1 INJECTION, SOLUTION SUBCUTANEOUS ONCE
Refills: 0 | Status: DISCONTINUED | OUTPATIENT
Start: 2021-08-07 | End: 2021-08-12

## 2021-08-07 RX ORDER — DEXTROSE 50 % IN WATER 50 %
15 SYRINGE (ML) INTRAVENOUS ONCE
Refills: 0 | Status: DISCONTINUED | OUTPATIENT
Start: 2021-08-07 | End: 2021-08-12

## 2021-08-07 RX ORDER — ACETAMINOPHEN 500 MG
650 TABLET ORAL EVERY 6 HOURS
Refills: 0 | Status: DISCONTINUED | OUTPATIENT
Start: 2021-08-07 | End: 2021-08-12

## 2021-08-07 RX ORDER — INSULIN LISPRO 100/ML
VIAL (ML) SUBCUTANEOUS
Refills: 0 | Status: DISCONTINUED | OUTPATIENT
Start: 2021-08-07 | End: 2021-08-08

## 2021-08-07 RX ORDER — DEXTROSE 50 % IN WATER 50 %
12.5 SYRINGE (ML) INTRAVENOUS ONCE
Refills: 0 | Status: DISCONTINUED | OUTPATIENT
Start: 2021-08-07 | End: 2021-08-12

## 2021-08-07 RX ADMIN — Medication 10 MILLIGRAM(S): at 16:04

## 2021-08-07 RX ADMIN — Medication 1: at 08:13

## 2021-08-07 RX ADMIN — Medication 125 MICROGRAM(S): at 08:19

## 2021-08-07 RX ADMIN — HEPARIN SODIUM 5000 UNIT(S): 5000 INJECTION INTRAVENOUS; SUBCUTANEOUS at 21:52

## 2021-08-07 RX ADMIN — Medication 81 MILLIGRAM(S): at 08:14

## 2021-08-07 RX ADMIN — HEPARIN SODIUM 5000 UNIT(S): 5000 INJECTION INTRAVENOUS; SUBCUTANEOUS at 11:14

## 2021-08-07 RX ADMIN — Medication 5: at 16:02

## 2021-08-07 RX ADMIN — Medication 10 MILLIGRAM(S): at 21:52

## 2021-08-07 RX ADMIN — Medication 3 MILLIGRAM(S): at 22:01

## 2021-08-07 RX ADMIN — SODIUM CHLORIDE 100 MILLILITER(S): 9 INJECTION, SOLUTION INTRAVENOUS at 20:32

## 2021-08-07 RX ADMIN — LOSARTAN POTASSIUM 100 MILLIGRAM(S): 100 TABLET, FILM COATED ORAL at 08:19

## 2021-08-07 NOTE — CHART NOTE - NSCHARTNOTEFT_GEN_A_CORE
Pt seen and examined bedside.  She remains confused.  Agree and will c/w current plan.  Will gently hydrate and trend LA.  Discussed case with endocrinology and given episodes of hypoglycemia will d/c lantus and monitor FS q4h given pt is not eating.  Once pt is tolerating diet will resume lantus.

## 2021-08-07 NOTE — CONSULT NOTE ADULT - ASSESSMENT
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   CTH negative. IDDM previously on insulin pump but cannot use it anymore due to her AMS.     Type 1 dm : poorly controlled.   She seems to be eating well  check Bgs actid and hs   use SSI for extracorrection   she needs to be on basal insulin at all times due to being type 1 dm   discussed w Dr spangler , her endo and she used to take 24 u lantus at 2 pm and lispro 5 u TID w meals.   i would monitor for now Bgs till tonight and decide what dose of lantus to give just to avoid DKA  is eats can get 3 units lispro tId . Hold if patient NPO     HypoT : cont LT4   TFts normal    AMS: probably multifactorial, metabolic, diabetes , possible UTI.   Management and investigations per primary team.

## 2021-08-07 NOTE — ED ADULT NURSE NOTE - OBJECTIVE STATEMENT
Pt w/psych hx brought in by her son who states pt has AMS.  Pt was recently treated for UTI.  When I asked pt what she came to ED for she stated that her son was trying to kill her.  Pt has been pleasant and cooperative.

## 2021-08-07 NOTE — H&P ADULT - NSHPPHYSICALEXAM_GEN_ALL_CORE
Vital Signs Last 24 Hrs  T(C): 36.9 (07 Aug 2021 06:26), Max: 36.9 (06 Aug 2021 18:13)  T(F): 98.4 (07 Aug 2021 06:26), Max: 98.4 (06 Aug 2021 18:13)  HR: 65 (07 Aug 2021 06:26) (52 - 72)  BP: 165/60 (07 Aug 2021 06:26) (157/60 - 174/68)  BP(mean): --  RR: 18 (07 Aug 2021 06:26) (17 - 18)  SpO2: 99% (07 Aug 2021 06:26) (99% - 100%)    Constitutional: Well-appearing, NAD, VSS  Head: NC/AT  Eyes: PERRL, EOMI, anicteric sclera, conjunctiva WNL +EYE PATCH  ENT: Normal Pharynx, MMM, No tonsillar exudate/erythema  Neck: Supple, Non-tender  Chest: Non-tender, no rashes  Cardio: RRR, s1/s2, no appreciable murmurs/rubs/gallops  Resp: BS CTA bilaterally, no wheezing/rhonchi/rales  Abd: Soft, Non-tender, Non-distended, no rebound/guarding/rigidity  : not examined  Rectal: not examined  MSK: moving all extremities, no motor weakness, full ROM x4  Ext: palpable distal pulses, good capillary refill  Psych: +confused, AMS, paranoia  Neuro: CN II-XII grossly intact, no focal deficits, +Confused/AMS, +Paranoid/Delusional, cooperative with simple commands, no slurred speech, normal gait  Skin: Warm/Dry. No rashes.

## 2021-08-07 NOTE — H&P ADULT - HISTORY OF PRESENT ILLNESS
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 Patient with AMS. CTH negative. IDDM previously on insulin pump but now basal bolus as she was having recurrent issues wth compliance and correctly using her pump (see outpt endo notes in [facility]). Recent admission for UTI as well s/p completion of Cefuroxime. Noted to have abnormal TFTs and thyroid medication was adjusted.     PMHX:  IDDM, Graves Dz, Hypothyroidism, Thyroid Eye Disease, HTN, HLD, CAD, TIA, MDD, OAB, Recurrent UTI  PSHX: none reported  FamHX: unable to obtain but none reported denies fam hx HTN  Social Hx: denies etoh/tobacco/drug abuse

## 2021-08-07 NOTE — CONSULT NOTE ADULT - SUBJECTIVE AND OBJECTIVE BOX
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 Patient with AMS. CTH negative. IDDM previously on insulin pump but now basal bolus as she was having recurrent issues wth compliance and correctly using her pump. Recent admission for UTI as well s/p completion of Cefuroxime. Noted to have abnormal TFTs and thyroid medication was adjusted. She is confused now and very poor historian.      PMHX:  IDDM, Graves Dz, Hypothyroidism, Thyroid Eye Disease, HTN, HLD, CAD, TIA, MDD, OAB, Recurrent UTI  PSHX: none reported  FamHX: unable to obtain but none reported denies fam hx HTN  Social Hx: denies etoh/tobacco/drug abuse    FAMILY HISTORY: cannot obtain     SOCIAL HISTORY: not obacco, no drugs, no etoh , lives alone has a son in [place]     REVIEW OF SYSTEMS:  Constitutional: No fever, no chills, no change in weight.  Eyes: No eye swelling,no  blurry vision  Neck: No neck pain  Lungs: No shortness of breath, no wheezing, no cough  CV: No chest pain, no palpitations  GI: No nausea, no vomiting, no constipation, no diarrhea, no abdominal pain  : No urinary frequency, no blood in urine  Musculoskeletal: No muscle pain, no joint pain  Skin: No rash  Neurologic: No headaches, no weakness  Endocrine: No heat intolerance, no cold intolerance  Psych: Pt s confused, poor historian and talks about her cats and she starts crying.     MEDICATIONS  (STANDING):  aspirin enteric coated 81 milliGRAM(s) Oral daily  dextrose 40% Gel 15 Gram(s) Oral once  dextrose 5%. 1000 milliLiter(s) (50 mL/Hr) IV Continuous <Continuous>  dextrose 5%. 1000 milliLiter(s) (100 mL/Hr) IV Continuous <Continuous>  dextrose 50% Injectable 25 Gram(s) IV Push once  dextrose 50% Injectable 12.5 Gram(s) IV Push once  dextrose 50% Injectable 25 Gram(s) IV Push once  glucagon  Injectable 1 milliGRAM(s) IntraMuscular once  heparin   Injectable 5000 Unit(s) SubCutaneous every 8 hours  insulin glargine Injectable (LANTUS) 20 Unit(s) SubCutaneous at bedtime  insulin lispro (ADMELOG) corrective regimen sliding scale   SubCutaneous three times a day with meals  lactated ringers. 1000 milliLiter(s) (100 mL/Hr) IV Continuous <Continuous>  levothyroxine 125 MICROGram(s) Oral daily  losartan 100 milliGRAM(s) Oral daily    MEDICATIONS  (PRN):  acetaminophen   Tablet .. 650 milliGRAM(s) Oral every 6 hours PRN Temp greater or equal to 38.5C (101.3F), Mild Pain (1 - 3)  aluminum hydroxide/magnesium hydroxide/simethicone Suspension 30 milliLiter(s) Oral every 4 hours PRN Dyspepsia  melatonin 3 milliGRAM(s) Oral at bedtime PRN Insomnia  ondansetron Injectable 4 milliGRAM(s) IV Push every 8 hours PRN Nausea and/or Vomiting    Allergies  No Known Allergies    CAPILLARY BLOOD GLUCOSE  POCT Blood Glucose.: 129 mg/dL (07 Aug 2021 11:10)      PHYSICAL EXAM:    Vital Signs Last 24 Hrs  T(C): 36.4 (07 Aug 2021 10:28), Max: 36.9 (06 Aug 2021 18:13)  T(F): 97.5 (07 Aug 2021 10:28), Max: 98.4 (06 Aug 2021 18:13)  HR: 55 (07 Aug 2021 10:28) (52 - 72)  BP: 180/63 (07 Aug 2021 10:28) (157/60 - 180/63)  BP(mean): --  RR: 18 (07 Aug 2021 10:28) (17 - 18)  SpO2: 99% (07 Aug 2021 10:28) (99% - 100%)  General appearance: Well developed, well nourished  Eyes: Pupils equal and reactive to light. EOM full. No exophthalmos.  Neck: Trachea midline. No thyroid enlargement.  Lungs: Normal respiratory excursion. Lungs clear.  CV: Regular cardiac rhythm. No murmur. Carotid and pedal pulses intact.  Abdomen: Soft, non tender, no organomegaly or mass.  Musculoskeletal: No cyanosis, clubbing, or edema. No pedal lesons.  Skin: Warm and moist. No rash.   Neuro: Does not participate well due to crying   Psych: She knows she is the hospital , not oriented to time     LABS:                        12.1   5.03  )-----------( 150      ( 07 Aug 2021 12:08 )             36.1     08-07    143  |  101  |  15.9  ----------------------------<  143<H>  4.5   |  31.0<H>  |  0.59    Ca    8.7      07 Aug 2021 12:09  Phos  2.6     08-07  Mg     2.0     08-07    TPro  5.9<L>  /  Alb  3.6  /  TBili  0.3<L>  /  DBili  x   /  AST  21  /  ALT  20  /  AlkPhos  53  08-07    Urinalysis Basic - ( 06 Aug 2021 23:09 )    Color: Yellow / Appearance: Clear / S.020 / pH: x  Gluc: x / Ketone: Negative  / Bili: Negative / Urobili: Negative mg/dL   Blood: x / Protein: 30 mg/dL / Nitrite: Negative   Leuk Esterase: Negative / RBC: 0-2 /HPF / WBC Negative   Sq Epi: x / Non Sq Epi: x / Bacteria: x      LIVER FUNCTIONS - ( 07 Aug 2021 12:09 )  Alb: 3.6 g/dL / Pro: 5.9 g/dL / ALK PHOS: 53 U/L / ALT: 20 U/L / AST: 21 U/L / GGT: x           CAPILLARY BLOOD GLUCOSE  POCT Blood Glucose.: 129 mg/dL (07 Aug 2021 11:10)  POCT Blood Glucose.: 181 mg/dL (07 Aug 2021 08:10)  POCT Blood Glucose.: 177 mg/dL (07 Aug 2021 06:31)  POCT Blood Glucose.: 151 mg/dL (07 Aug 2021 01:52)  POCT Blood Glucose.: 58 mg/dL (07 Aug 2021 00:26)  POCT Blood Glucose.: 212 mg/dL (06 Aug 2021 18:19)

## 2021-08-07 NOTE — ED ADULT NURSE REASSESSMENT NOTE - NS ED NURSE REASSESS COMMENT FT1
Pt st cath for urine without difficulty.  Pt was calm and cooperative with no c/o pain or discomfort.

## 2021-08-07 NOTE — ED ADULT NURSE NOTE - JUGULAR VENOUS DISTENTION
Subjective:       Patient ID: Dorothy Jesus is a 34 y.o. female.    Chief Complaint: Dizziness    HPI   Patient reports dizziness started several weeks to months ago.  It was initially intermittent, but has increased in frequency and is now occurring daily.  Patient describes dizziness as: disorienting, feels like things shift when she moves like it is taking a few seconds for things to catch up.  It is not positional.  She feels best in the morning, and develops dizziness gradually throughout the day.  Dizziness lasts for several hours, and is worse in the evening affecting her ability to walk or remain active.  Dizziness increases with physical activity. She has a history of migraines, and used to experience dizziness only with migraines, but for the past two months, her dizziness has been without any headache pain. She has never seen a neurologist for her migraines or fibromyalgia. She reports two episodes of near-syncope (peripheral vision went black/fuzzy and felt like she was going to black out for a minute) during two different recent days of dizziness.     Review of Systems   Constitutional: Negative.    Eyes: Negative.    Respiratory: Negative.    Cardiovascular: Negative.    Gastrointestinal: Negative.    Musculoskeletal: Negative.    Skin: Negative.    Neurological: Positive for dizziness, light-headedness and headaches.   Hematological: Negative.    Psychiatric/Behavioral: Negative.        Objective:      Physical Exam   Constitutional: She is oriented to person, place, and time. Vital signs are normal. She appears well-developed and well-nourished. She is cooperative. She does not appear ill. No distress.   HENT:   Head: Normocephalic and atraumatic.   Right Ear: Hearing, tympanic membrane, external ear and ear canal normal. Tympanic membrane is not erythematous. No middle ear effusion.   Left Ear: Hearing, tympanic membrane, external ear and ear canal normal. Tympanic membrane is not  erythematous.  No middle ear effusion.   Nose: Nose normal. No mucosal edema or rhinorrhea. Right sinus exhibits no maxillary sinus tenderness and no frontal sinus tenderness. Left sinus exhibits no maxillary sinus tenderness and no frontal sinus tenderness.   Mouth/Throat: Uvula is midline, oropharynx is clear and moist and mucous membranes are normal. Mucous membranes are not pale, not dry and not cyanotic. No oral lesions. No oropharyngeal exudate, posterior oropharyngeal edema or posterior oropharyngeal erythema.   Eyes: Conjunctivae, EOM and lids are normal. Pupils are equal, round, and reactive to light. Right eye exhibits no discharge. Left eye exhibits no discharge. No scleral icterus.   Neck: Trachea normal and normal range of motion. Neck supple. No tracheal deviation present. No thyroid mass and no thyromegaly present.   Cardiovascular: Normal rate.    Pulmonary/Chest: Effort normal. No stridor. No respiratory distress. She has no wheezes.   Musculoskeletal: Normal range of motion.   Lymphadenopathy:        Head (right side): No submental, no submandibular, no tonsillar, no preauricular and no posterior auricular adenopathy present.        Head (left side): No submental, no submandibular, no tonsillar, no preauricular and no posterior auricular adenopathy present.     She has no cervical adenopathy.        Right cervical: No superficial cervical and no posterior cervical adenopathy present.       Left cervical: No superficial cervical and no posterior cervical adenopathy present.   Neurological: She is alert and oriented to person, place, and time. She has normal strength. Coordination and gait normal.   Skin: Skin is warm, dry and intact. No lesion and no rash noted. She is not diaphoretic. No cyanosis. No pallor.   Psychiatric: She has a normal mood and affect. Her speech is normal and behavior is normal. Judgment and thought content normal. Cognition and memory are normal.   Nursing note and vitals  reviewed.    Negative Meaghan-Hallpike AU (she was unable to lower her head below her body during test due to her fibromyalgia)    Assessment:     Disequilibrium, not likely vestibular in nature    Migraines and fibromyalgia, not currently under the care of a neurologist  Audiogram is essentially normal with only a mild HL @ 6-8 kHz  Plan:     Schedule VNG for comprehensive vestibular system diagnostic testing at [facility]' balance lab.      Recommend neurologist for migraine/fibromyalgia management   absent

## 2021-08-07 NOTE — H&P ADULT - ASSESSMENT
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    PLAN:  AMS, IDDM, Hypoglycemic Episode  -admit to medicine  -repeat labs  -check VBG/Lactate  -UA/UCX/BCX x2  -CTH negative acute changes  -neurochecks Q4  -IVFB cont IVF @100cc/hr   -check reversible causes delirium/AMS  -check A1C/TSH/Folate/B12/NH3/VBG/CPK  -No longer using insulin pump apparently  -Basal/Bolus Regimen  -Lower basal coverage to Lantus 20 units QHS (24-26 home dose)  -ISS. Accuchecks. ADA Diet. Repeat A1C.  -Episode of hypoglycemia with BS 50s in ED  -Resolved. Monitor Accucheks Q4  -Suspect this is sequelae of progressive behavioral disturbance/paranoia likely undiagnosed dementia with behavioral disturbance (forgetful at home)  -Endo Consulted     OAB, Recent UTI  -Check UA/UCX/BCX x2  -Straigt Cath for UA/UCX  -Hold Myrbetriq in case causing urinary retention    CAD, TIA, HTN  -ASA 81mg PO q24  -Losartan 100mg PO q245  -Hold HCTZ 12.5mg PO q24    Hypothyroidism, Thyroid Eye Disease  -Levothyroxine 125mcg PO q24.   -Repeat TSH/T3/Free T4.   -Tepezza infusions    HLD  -Unable to tolerate statins  -Praluent q2 weeks

## 2021-08-08 LAB
A1C WITH ESTIMATED AVERAGE GLUCOSE RESULT: 9.4 % — HIGH (ref 4–5.6)
ALBUMIN SERPL ELPH-MCNC: 3.4 G/DL — SIGNIFICANT CHANGE UP (ref 3.3–5.2)
ALP SERPL-CCNC: 64 U/L — SIGNIFICANT CHANGE UP (ref 40–120)
ALT FLD-CCNC: 21 U/L — SIGNIFICANT CHANGE UP
ANION GAP SERPL CALC-SCNC: 13 MMOL/L — SIGNIFICANT CHANGE UP (ref 5–17)
ANION GAP SERPL CALC-SCNC: 15 MMOL/L — SIGNIFICANT CHANGE UP (ref 5–17)
ANION GAP SERPL CALC-SCNC: 18 MMOL/L — HIGH (ref 5–17)
APPEARANCE UR: ABNORMAL
AST SERPL-CCNC: 21 U/L — SIGNIFICANT CHANGE UP
B-OH-BUTYR SERPL-SCNC: 0.1 MMOL/L — SIGNIFICANT CHANGE UP
BACTERIA # UR AUTO: ABNORMAL
BILIRUB SERPL-MCNC: 0.6 MG/DL — SIGNIFICANT CHANGE UP (ref 0.4–2)
BILIRUB UR-MCNC: NEGATIVE — SIGNIFICANT CHANGE UP
BUN SERPL-MCNC: 22 MG/DL — HIGH (ref 8–20)
BUN SERPL-MCNC: 26.3 MG/DL — HIGH (ref 8–20)
BUN SERPL-MCNC: 31.1 MG/DL — HIGH (ref 8–20)
CALCIUM SERPL-MCNC: 8.4 MG/DL — LOW (ref 8.6–10.2)
CALCIUM SERPL-MCNC: 8.4 MG/DL — LOW (ref 8.6–10.2)
CALCIUM SERPL-MCNC: 8.7 MG/DL — SIGNIFICANT CHANGE UP (ref 8.6–10.2)
CHLORIDE SERPL-SCNC: 97 MMOL/L — LOW (ref 98–107)
CHLORIDE SERPL-SCNC: 97 MMOL/L — LOW (ref 98–107)
CHLORIDE SERPL-SCNC: 99 MMOL/L — SIGNIFICANT CHANGE UP (ref 98–107)
CO2 SERPL-SCNC: 22 MMOL/L — SIGNIFICANT CHANGE UP (ref 22–29)
CO2 SERPL-SCNC: 22 MMOL/L — SIGNIFICANT CHANGE UP (ref 22–29)
CO2 SERPL-SCNC: 25 MMOL/L — SIGNIFICANT CHANGE UP (ref 22–29)
COLOR SPEC: SIGNIFICANT CHANGE UP
COVID-19 SPIKE DOMAIN AB INTERP: POSITIVE
COVID-19 SPIKE DOMAIN ANTIBODY RESULT: >250 U/ML — HIGH
CREAT SERPL-MCNC: 0.68 MG/DL — SIGNIFICANT CHANGE UP (ref 0.5–1.3)
CREAT SERPL-MCNC: 0.82 MG/DL — SIGNIFICANT CHANGE UP (ref 0.5–1.3)
CREAT SERPL-MCNC: 0.89 MG/DL — SIGNIFICANT CHANGE UP (ref 0.5–1.3)
DIFF PNL FLD: ABNORMAL
EPI CELLS # UR: SIGNIFICANT CHANGE UP
ESTIMATED AVERAGE GLUCOSE: 223 MG/DL — HIGH (ref 68–114)
GAS PNL BLDA: SIGNIFICANT CHANGE UP
GLUCOSE BLDC GLUCOMTR-MCNC: 262 MG/DL — HIGH (ref 70–99)
GLUCOSE BLDC GLUCOMTR-MCNC: 379 MG/DL — HIGH (ref 70–99)
GLUCOSE BLDC GLUCOMTR-MCNC: 443 MG/DL — HIGH (ref 70–99)
GLUCOSE BLDC GLUCOMTR-MCNC: 460 MG/DL — CRITICAL HIGH (ref 70–99)
GLUCOSE SERPL-MCNC: 271 MG/DL — HIGH (ref 70–99)
GLUCOSE SERPL-MCNC: 311 MG/DL — HIGH (ref 70–99)
GLUCOSE SERPL-MCNC: 463 MG/DL — HIGH (ref 70–99)
GLUCOSE UR QL: 1000 MG/DL
HCT VFR BLD CALC: 40.2 % — SIGNIFICANT CHANGE UP (ref 34.5–45)
HCV AB S/CO SERPL IA: 0.09 S/CO — SIGNIFICANT CHANGE UP (ref 0–0.99)
HCV AB SERPL-IMP: SIGNIFICANT CHANGE UP
HGB BLD-MCNC: 13.6 G/DL — SIGNIFICANT CHANGE UP (ref 11.5–15.5)
KETONES UR-MCNC: ABNORMAL
LACTATE SERPL-SCNC: 2 MMOL/L — SIGNIFICANT CHANGE UP (ref 0.5–2)
LACTATE SERPL-SCNC: 3.8 MMOL/L — HIGH (ref 0.5–2)
LEUKOCYTE ESTERASE UR-ACNC: ABNORMAL
MAGNESIUM SERPL-MCNC: 1.9 MG/DL — SIGNIFICANT CHANGE UP (ref 1.6–2.6)
MCHC RBC-ENTMCNC: 32.1 PG — SIGNIFICANT CHANGE UP (ref 27–34)
MCHC RBC-ENTMCNC: 33.8 GM/DL — SIGNIFICANT CHANGE UP (ref 32–36)
MCV RBC AUTO: 94.8 FL — SIGNIFICANT CHANGE UP (ref 80–100)
NITRITE UR-MCNC: NEGATIVE — SIGNIFICANT CHANGE UP
PH UR: 6.5 — SIGNIFICANT CHANGE UP (ref 5–8)
PHOSPHATE SERPL-MCNC: 3.2 MG/DL — SIGNIFICANT CHANGE UP (ref 2.4–4.7)
PLATELET # BLD AUTO: 167 K/UL — SIGNIFICANT CHANGE UP (ref 150–400)
POTASSIUM SERPL-MCNC: 3.8 MMOL/L — SIGNIFICANT CHANGE UP (ref 3.5–5.3)
POTASSIUM SERPL-MCNC: 3.8 MMOL/L — SIGNIFICANT CHANGE UP (ref 3.5–5.3)
POTASSIUM SERPL-MCNC: 4.5 MMOL/L — SIGNIFICANT CHANGE UP (ref 3.5–5.3)
POTASSIUM SERPL-SCNC: 3.8 MMOL/L — SIGNIFICANT CHANGE UP (ref 3.5–5.3)
POTASSIUM SERPL-SCNC: 3.8 MMOL/L — SIGNIFICANT CHANGE UP (ref 3.5–5.3)
POTASSIUM SERPL-SCNC: 4.5 MMOL/L — SIGNIFICANT CHANGE UP (ref 3.5–5.3)
PROT SERPL-MCNC: 5.9 G/DL — LOW (ref 6.6–8.7)
PROT UR-MCNC: 30 MG/DL
RBC # BLD: 4.24 M/UL — SIGNIFICANT CHANGE UP (ref 3.8–5.2)
RBC # FLD: 14.6 % — HIGH (ref 10.3–14.5)
RBC CASTS # UR COMP ASSIST: ABNORMAL /HPF (ref 0–4)
SARS-COV-2 IGG+IGM SERPL QL IA: >250 U/ML — HIGH
SARS-COV-2 IGG+IGM SERPL QL IA: POSITIVE
SODIUM SERPL-SCNC: 135 MMOL/L — SIGNIFICANT CHANGE UP (ref 135–145)
SODIUM SERPL-SCNC: 136 MMOL/L — SIGNIFICANT CHANGE UP (ref 135–145)
SODIUM SERPL-SCNC: 137 MMOL/L — SIGNIFICANT CHANGE UP (ref 135–145)
SP GR SPEC: 1.01 — SIGNIFICANT CHANGE UP (ref 1.01–1.02)
UROBILINOGEN FLD QL: NEGATIVE MG/DL — SIGNIFICANT CHANGE UP
WBC # BLD: 12.23 K/UL — HIGH (ref 3.8–10.5)
WBC # FLD AUTO: 12.23 K/UL — HIGH (ref 3.8–10.5)
WBC UR QL: ABNORMAL

## 2021-08-08 PROCEDURE — 99233 SBSQ HOSP IP/OBS HIGH 50: CPT

## 2021-08-08 PROCEDURE — 99232 SBSQ HOSP IP/OBS MODERATE 35: CPT

## 2021-08-08 RX ORDER — AMLODIPINE BESYLATE 2.5 MG/1
10 TABLET ORAL DAILY
Refills: 0 | Status: DISCONTINUED | OUTPATIENT
Start: 2021-08-08 | End: 2021-08-12

## 2021-08-08 RX ORDER — HYDRALAZINE HCL 50 MG
10 TABLET ORAL ONCE
Refills: 0 | Status: COMPLETED | OUTPATIENT
Start: 2021-08-08 | End: 2021-08-08

## 2021-08-08 RX ORDER — HYDRALAZINE HCL 50 MG
10 TABLET ORAL EVERY 8 HOURS
Refills: 0 | Status: DISCONTINUED | OUTPATIENT
Start: 2021-08-08 | End: 2021-08-12

## 2021-08-08 RX ORDER — INSULIN HUMAN 100 [IU]/ML
10 INJECTION, SOLUTION SUBCUTANEOUS ONCE
Refills: 0 | Status: COMPLETED | OUTPATIENT
Start: 2021-08-08 | End: 2021-08-08

## 2021-08-08 RX ORDER — INSULIN LISPRO 100/ML
3 VIAL (ML) SUBCUTANEOUS
Refills: 0 | Status: DISCONTINUED | OUTPATIENT
Start: 2021-08-08 | End: 2021-08-09

## 2021-08-08 RX ORDER — INSULIN GLARGINE 100 [IU]/ML
10 INJECTION, SOLUTION SUBCUTANEOUS AT BEDTIME
Refills: 0 | Status: DISCONTINUED | OUTPATIENT
Start: 2021-08-08 | End: 2021-08-09

## 2021-08-08 RX ORDER — CEFTRIAXONE 500 MG/1
INJECTION, POWDER, FOR SOLUTION INTRAMUSCULAR; INTRAVENOUS
Refills: 0 | Status: COMPLETED | OUTPATIENT
Start: 2021-08-08 | End: 2021-08-12

## 2021-08-08 RX ORDER — INSULIN LISPRO 100/ML
VIAL (ML) SUBCUTANEOUS
Refills: 0 | Status: DISCONTINUED | OUTPATIENT
Start: 2021-08-08 | End: 2021-08-12

## 2021-08-08 RX ORDER — CEFTRIAXONE 500 MG/1
1000 INJECTION, POWDER, FOR SOLUTION INTRAMUSCULAR; INTRAVENOUS EVERY 24 HOURS
Refills: 0 | Status: COMPLETED | OUTPATIENT
Start: 2021-08-09 | End: 2021-08-11

## 2021-08-08 RX ORDER — SODIUM CHLORIDE 9 MG/ML
1000 INJECTION INTRAMUSCULAR; INTRAVENOUS; SUBCUTANEOUS
Refills: 0 | Status: DISCONTINUED | OUTPATIENT
Start: 2021-08-08 | End: 2021-08-09

## 2021-08-08 RX ORDER — CEFTRIAXONE 500 MG/1
1000 INJECTION, POWDER, FOR SOLUTION INTRAMUSCULAR; INTRAVENOUS ONCE
Refills: 0 | Status: COMPLETED | OUTPATIENT
Start: 2021-08-08 | End: 2021-08-08

## 2021-08-08 RX ORDER — INSULIN HUMAN 100 [IU]/ML
10 INJECTION, SOLUTION SUBCUTANEOUS ONCE
Refills: 0 | Status: DISCONTINUED | OUTPATIENT
Start: 2021-08-08 | End: 2021-08-12

## 2021-08-08 RX ADMIN — HEPARIN SODIUM 5000 UNIT(S): 5000 INJECTION INTRAVENOUS; SUBCUTANEOUS at 21:44

## 2021-08-08 RX ADMIN — Medication 10: at 11:26

## 2021-08-08 RX ADMIN — HEPARIN SODIUM 5000 UNIT(S): 5000 INJECTION INTRAVENOUS; SUBCUTANEOUS at 13:16

## 2021-08-08 RX ADMIN — Medication 125 MICROGRAM(S): at 05:55

## 2021-08-08 RX ADMIN — Medication 81 MILLIGRAM(S): at 11:27

## 2021-08-08 RX ADMIN — Medication 12: at 16:35

## 2021-08-08 RX ADMIN — INSULIN HUMAN 10 UNIT(S): 100 INJECTION, SOLUTION SUBCUTANEOUS at 16:36

## 2021-08-08 RX ADMIN — Medication 3 UNIT(S): at 16:35

## 2021-08-08 RX ADMIN — HEPARIN SODIUM 5000 UNIT(S): 5000 INJECTION INTRAVENOUS; SUBCUTANEOUS at 05:55

## 2021-08-08 RX ADMIN — Medication 3 UNIT(S): at 11:26

## 2021-08-08 RX ADMIN — INSULIN GLARGINE 10 UNIT(S): 100 INJECTION, SOLUTION SUBCUTANEOUS at 21:45

## 2021-08-08 RX ADMIN — LOSARTAN POTASSIUM 100 MILLIGRAM(S): 100 TABLET, FILM COATED ORAL at 05:55

## 2021-08-08 RX ADMIN — CEFTRIAXONE 100 MILLIGRAM(S): 500 INJECTION, POWDER, FOR SOLUTION INTRAMUSCULAR; INTRAVENOUS at 10:23

## 2021-08-08 RX ADMIN — Medication 10 MILLIGRAM(S): at 03:05

## 2021-08-08 RX ADMIN — SODIUM CHLORIDE 100 MILLILITER(S): 9 INJECTION INTRAMUSCULAR; INTRAVENOUS; SUBCUTANEOUS at 13:16

## 2021-08-08 NOTE — PROGRESS NOTE ADULT - ASSESSMENT
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        Acute metabolic encephalopathy likely 2/2 UTI vs hypoglycemia w/ hx of progressive behavioral disturbances/dementia  - Repeat UA w/ pyuria and leukocytosis but afebrile and nontoxic appearing  - Started on rocephin   - Cultures/sensitivities pending   - Trend WBC   - Continue to hold Myrbetriq in case causing urinary retention  - Hypoglecemia resolved   - CTH negative acute changes  - s/p gentle hydration   - TSH, vitamin and ammonia levels WNL  - Outpt documentation reviewed and pt noted to have progressive behavioral disturbances/dementia and following w/ neurology       IDDM w/ impending DKA   - A1c 9.4  - Hypoglycemic Episodes on admission resolved  - Resumed basal/bolus regimen   - 1x dose of 10u regular insulin given hyperglycemia   - AG elevated today.  LA pending   - Will order ABG to better assess acid/base disturbance  - Will repeat BMP in 2 hrs to reassess and if DKA will consult ICU  - c/w ISS and hypoglycemic protocol  - Endo following       HTN Urgency  - BP labile  - c/w home medications and titrate as needed to optimize BP      Hx of CAD / TIA   - c/w ASA and losartan  - Continue to hold HCTZ and resume on discharge      Hypothyroidism, Thyroid Eye Disease  - Tepezza infusions  - c/w Levothyroxine   - TFTs WNL      HLD  - Unable to tolerate statins  - Praluent q2 weeks      VTE ppx: heparin sq    Dispo: pending clinical course

## 2021-08-08 NOTE — HISTORY OF PRESENT ILLNESS
[FreeTextEntry1] : 71 year old female with uncontrolled typeI  DM Patient is here for evaluation forgetfulness that has been occurring for the past few years. She notes she has been forgetting names and ? faces. she has cats at home which she takes care of.   Son notes she has been paranoid about things, thought the government was doing something with her mortgage. she can be irritable at times.  she has to be prompt to eat as she had no desire \par \par son reports she has been having behavior changes more so in the past few weeks. feels she is depressed and persuvates on things.  \par \par Her boyfriend reports she has been out of character for the last 11 months. feels she is a risk being alone. \par taking tepezza son and Bf thinks she has declined since taking this. \par \par \par \par \par \par \par \par 70yo female bib ems s/p syncopal episode. pt states\par she was sitting at her kitchen table checking her sugar and she passed out,\par woke up on the floor, no dizziness or chest pain, +headache, pt was able to\par ambulate after incidient, no other complaints

## 2021-08-08 NOTE — ASSESSMENT
[FreeTextEntry1] : 71 year old female with  HTN, HLD,type I  DM, uncontrolled with complaints of memory difficulties and mood changes for which has been occurring for the past year. ? setting of non compliance to her DM regime ?depression as she has been independent in ADLs per family. Will obtain MRI to assess for structural changes and well as BW for metabolic  causes. She has agreed to seek psych consult to address her mood. \par patient and family are in agreement to above plan\par she will continue to follow with her PCP/ endocrinologist

## 2021-08-08 NOTE — PHYSICAL EXAM
[Depressed] : depressed [Flat] : flat [Rate Slowed] : slowed [Person] : oriented to person [Place] : oriented to place [Time] : oriented to time [Short Term Intact] : short term memory intact [Remote Intact] : remote memory intact [Registration Intact] : recent registration memory intact [Concentration Intact] : normal concentrating ability [Visual Intact] : visual attention was ~T not ~L decreased [Naming Objects] : no difficulty naming common objects [Repeating Phrases] : no difficulty repeating a phrase [Fluency] : fluency intact [Comprehension] : comprehension intact [Past History] : adequate knowledge of personal past history [Date / Time ___ / 5] : date / time [unfilled] / 5 [Place ___ / 5] : place [unfilled] / 5 [Registration ___ / 3] : registration [unfilled] / 3 [Serial Sevens ___/5] : serial sevens [unfilled] / 5 [Naming 2 Objects ___ / 2] : naming two objects [unfilled] / 2 [Repeating a Sentence ___ / 1] : repeating a sentence [unfilled] / 1 [Writing a Sentence ___ / 1] : write sentence [unfilled] / 1 [Written Command ___ / 1] : written command [unfilled] / 1 [Copy a Design ___ / 1] : copy a design [unfilled] / 1 [Recall ___ / 3] : recall [unfilled] / 3 [Cranial Nerves Optic (II)] : visual acuity intact bilaterally,  visual fields full to confrontation, pupils equal round and reactive to light [Cranial Nerves Oculomotor (III)] : extraocular motion intact [Cranial Nerves Trigeminal (V)] : facial sensation intact symmetrically [Cranial Nerves Facial (VII)] : face symmetrical [Cranial Nerves Vestibulocochlear (VIII)] : hearing was intact bilaterally [Cranial Nerves Glossopharyngeal (IX)] : tongue and palate midline [Cranial Nerves Accessory (XI - Cranial And Spinal)] : head turning and shoulder shrug symmetric [Cranial Nerves Hypoglossal (XII)] : there was no tongue deviation with protrusion [Motor Tone] : muscle tone was normal in all four extremities [Motor Strength] : muscle strength was normal in all four extremities [No Muscle Atrophy] : normal bulk in all four extremities [Sensation Tactile Decrease] : light touch was intact [Abnormal Walk] : normal gait [Balance] : balance was intact [General Appearance - Well Developed] : well developed [3-stage Verbal Command ___ / 3] : three-stage verbal command [unfilled] / 3 [Motor Handedness Right-Handed] : the patient is right hand dominant [Past-pointing] : there was no past-pointing [Tremor] : no tremor present [Plantar Reflex Right Only] : normal on the right [Plantar Reflex Left Only] : normal on the left [FreeTextEntry9] : DTR symmetrically decreased

## 2021-08-08 NOTE — PROGRESS NOTE ADULT - ASSESSMENT
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   CTH negative. IDDM previously on insulin pump but cannot use it anymore due to her AMS.     Type 1 dm : poorly controlled. She used to be on lantus 24 u qd and lispro 10 u TID w meals at home when fully alert.   start lantus 12 u HS   cont lispro 3 uTID w meals. .   check Bgs actid and hs   use SSI for correction   she needs to be on basal insulin at all times due to being type 1 dm     HypoT : cont LT4   TFts normal    AMS: probably multifactorial, metabolic, diabetes , possible UTI.   Management and investigations per primary team.

## 2021-08-09 LAB
A1C WITH ESTIMATED AVERAGE GLUCOSE RESULT: 9.7 % — HIGH (ref 4–5.6)
ANION GAP SERPL CALC-SCNC: 9 MMOL/L — SIGNIFICANT CHANGE UP (ref 5–17)
BASOPHILS # BLD AUTO: 0.03 K/UL — SIGNIFICANT CHANGE UP (ref 0–0.2)
BASOPHILS NFR BLD AUTO: 0.3 % — SIGNIFICANT CHANGE UP (ref 0–2)
BUN SERPL-MCNC: 26 MG/DL — HIGH (ref 8–20)
CALCIUM SERPL-MCNC: 8 MG/DL — LOW (ref 8.6–10.2)
CHLORIDE SERPL-SCNC: 100 MMOL/L — SIGNIFICANT CHANGE UP (ref 98–107)
CO2 SERPL-SCNC: 27 MMOL/L — SIGNIFICANT CHANGE UP (ref 22–29)
CREAT SERPL-MCNC: 0.67 MG/DL — SIGNIFICANT CHANGE UP (ref 0.5–1.3)
EOSINOPHIL # BLD AUTO: 0.16 K/UL — SIGNIFICANT CHANGE UP (ref 0–0.5)
EOSINOPHIL NFR BLD AUTO: 1.8 % — SIGNIFICANT CHANGE UP (ref 0–6)
ESTIMATED AVERAGE GLUCOSE: 232 MG/DL — HIGH (ref 68–114)
GLUCOSE BLDC GLUCOMTR-MCNC: 178 MG/DL — HIGH (ref 70–99)
GLUCOSE BLDC GLUCOMTR-MCNC: 181 MG/DL — HIGH (ref 70–99)
GLUCOSE BLDC GLUCOMTR-MCNC: 186 MG/DL — HIGH (ref 70–99)
GLUCOSE BLDC GLUCOMTR-MCNC: 225 MG/DL — HIGH (ref 70–99)
GLUCOSE BLDC GLUCOMTR-MCNC: 236 MG/DL — HIGH (ref 70–99)
GLUCOSE BLDC GLUCOMTR-MCNC: 313 MG/DL — HIGH (ref 70–99)
GLUCOSE SERPL-MCNC: 245 MG/DL — HIGH (ref 70–99)
HCT VFR BLD CALC: 33.8 % — LOW (ref 34.5–45)
HGB BLD-MCNC: 11.6 G/DL — SIGNIFICANT CHANGE UP (ref 11.5–15.5)
IMM GRANULOCYTES NFR BLD AUTO: 0.2 % — SIGNIFICANT CHANGE UP (ref 0–1.5)
LACTATE SERPL-SCNC: 1.4 MMOL/L — SIGNIFICANT CHANGE UP (ref 0.5–2)
LYMPHOCYTES # BLD AUTO: 1.96 K/UL — SIGNIFICANT CHANGE UP (ref 1–3.3)
LYMPHOCYTES # BLD AUTO: 21.7 % — SIGNIFICANT CHANGE UP (ref 13–44)
MAGNESIUM SERPL-MCNC: 1.9 MG/DL — SIGNIFICANT CHANGE UP (ref 1.6–2.6)
MCHC RBC-ENTMCNC: 31.7 PG — SIGNIFICANT CHANGE UP (ref 27–34)
MCHC RBC-ENTMCNC: 34.3 GM/DL — SIGNIFICANT CHANGE UP (ref 32–36)
MCV RBC AUTO: 92.3 FL — SIGNIFICANT CHANGE UP (ref 80–100)
MONOCYTES # BLD AUTO: 0.66 K/UL — SIGNIFICANT CHANGE UP (ref 0–0.9)
MONOCYTES NFR BLD AUTO: 7.3 % — SIGNIFICANT CHANGE UP (ref 2–14)
NEUTROPHILS # BLD AUTO: 6.2 K/UL — SIGNIFICANT CHANGE UP (ref 1.8–7.4)
NEUTROPHILS NFR BLD AUTO: 68.7 % — SIGNIFICANT CHANGE UP (ref 43–77)
PHOSPHATE SERPL-MCNC: 2.3 MG/DL — LOW (ref 2.4–4.7)
PLATELET # BLD AUTO: 161 K/UL — SIGNIFICANT CHANGE UP (ref 150–400)
POTASSIUM SERPL-MCNC: 4.1 MMOL/L — SIGNIFICANT CHANGE UP (ref 3.5–5.3)
POTASSIUM SERPL-SCNC: 4.1 MMOL/L — SIGNIFICANT CHANGE UP (ref 3.5–5.3)
RBC # BLD: 3.66 M/UL — LOW (ref 3.8–5.2)
RBC # FLD: 14.6 % — HIGH (ref 10.3–14.5)
SODIUM SERPL-SCNC: 136 MMOL/L — SIGNIFICANT CHANGE UP (ref 135–145)
WBC # BLD: 9.03 K/UL — SIGNIFICANT CHANGE UP (ref 3.8–10.5)
WBC # FLD AUTO: 9.03 K/UL — SIGNIFICANT CHANGE UP (ref 3.8–10.5)

## 2021-08-09 PROCEDURE — 99232 SBSQ HOSP IP/OBS MODERATE 35: CPT

## 2021-08-09 RX ORDER — SODIUM CHLORIDE 9 MG/ML
1000 INJECTION INTRAMUSCULAR; INTRAVENOUS; SUBCUTANEOUS
Refills: 0 | Status: COMPLETED | OUTPATIENT
Start: 2021-08-09 | End: 2021-08-09

## 2021-08-09 RX ORDER — SODIUM CHLORIDE 9 MG/ML
1000 INJECTION INTRAMUSCULAR; INTRAVENOUS; SUBCUTANEOUS
Refills: 0 | Status: DISCONTINUED | OUTPATIENT
Start: 2021-08-09 | End: 2021-08-09

## 2021-08-09 RX ORDER — INSULIN LISPRO 100/ML
4 VIAL (ML) SUBCUTANEOUS
Refills: 0 | Status: DISCONTINUED | OUTPATIENT
Start: 2021-08-09 | End: 2021-08-12

## 2021-08-09 RX ORDER — INSULIN GLARGINE 100 [IU]/ML
15 INJECTION, SOLUTION SUBCUTANEOUS AT BEDTIME
Refills: 0 | Status: DISCONTINUED | OUTPATIENT
Start: 2021-08-09 | End: 2021-08-12

## 2021-08-09 RX ORDER — SODIUM,POTASSIUM PHOSPHATES 278-250MG
1 POWDER IN PACKET (EA) ORAL ONCE
Refills: 0 | Status: COMPLETED | OUTPATIENT
Start: 2021-08-09 | End: 2021-08-09

## 2021-08-09 RX ADMIN — HEPARIN SODIUM 5000 UNIT(S): 5000 INJECTION INTRAVENOUS; SUBCUTANEOUS at 04:56

## 2021-08-09 RX ADMIN — Medication 3 UNIT(S): at 08:41

## 2021-08-09 RX ADMIN — AMLODIPINE BESYLATE 10 MILLIGRAM(S): 2.5 TABLET ORAL at 04:56

## 2021-08-09 RX ADMIN — Medication 4: at 08:41

## 2021-08-09 RX ADMIN — Medication 2: at 17:40

## 2021-08-09 RX ADMIN — Medication 4: at 12:50

## 2021-08-09 RX ADMIN — CEFTRIAXONE 100 MILLIGRAM(S): 500 INJECTION, POWDER, FOR SOLUTION INTRAMUSCULAR; INTRAVENOUS at 05:25

## 2021-08-09 RX ADMIN — HEPARIN SODIUM 5000 UNIT(S): 5000 INJECTION INTRAVENOUS; SUBCUTANEOUS at 17:39

## 2021-08-09 RX ADMIN — LOSARTAN POTASSIUM 100 MILLIGRAM(S): 100 TABLET, FILM COATED ORAL at 04:56

## 2021-08-09 RX ADMIN — Medication 3 UNIT(S): at 12:49

## 2021-08-09 RX ADMIN — Medication 1 PACKET(S): at 12:49

## 2021-08-09 RX ADMIN — SODIUM CHLORIDE 70 MILLILITER(S): 9 INJECTION INTRAMUSCULAR; INTRAVENOUS; SUBCUTANEOUS at 08:41

## 2021-08-09 RX ADMIN — Medication 125 MICROGRAM(S): at 04:56

## 2021-08-09 RX ADMIN — Medication 4 UNIT(S): at 17:40

## 2021-08-09 RX ADMIN — INSULIN GLARGINE 15 UNIT(S): 100 INJECTION, SOLUTION SUBCUTANEOUS at 22:00

## 2021-08-09 RX ADMIN — Medication 81 MILLIGRAM(S): at 12:49

## 2021-08-09 NOTE — PROGRESS NOTE ADULT - ASSESSMENT
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        Acute metabolic encephalopathy likely 2/2 UTI vs hypoglycemia w/ hx of progressive behavioral disturbances/dementia  - Repeat UA w/ pyuria w/ leukocytosis  - Leukocytosis now resolved  - c/w Rocephin (D 2/3)  - Initial Ucx (-).  Repeat Ucx pending   - Continue to hold Myrbetriq in case causing urinary retention  - Hypoglecemia resolved   - CTH negative acute changes  - s/p gentle hydration   - TSH, vitamin and ammonia levels WNL  - Outpt documentation reviewed and pt noted to have progressive behavioral disturbances/dementia and following w/ neurology       IDDM w/ impending DKA, resolved   - A1c 9.4  - Hypoglycemic Episodes on admission resolved  - Resumed basal/bolus regimen and titrating to maintain BG <180   - AG and lactic acid WNL   - c/w ISS and hypoglycemic protocol  - Endo following       HTN Urgency  - BP labile  - c/w home medications and titrate as needed to optimize BP      Hx of CAD / TIA   - c/w ASA and losartan  - Continue to hold HCTZ and resume on discharge      Hypothyroidism, Thyroid Eye Disease  - Tepezza infusions  - c/w Levothyroxine   - TFTs WNL      HLD  - Unable to tolerate statins  - Praluent q2 weeks      VTE ppx: heparin sq    Dispo: pending clinical course

## 2021-08-09 NOTE — PROGRESS NOTE ADULT - ASSESSMENT
72 y/o with long history of type 1 diabetes, admitted due to rapid (subacute) decline in cognition. Pt. has declined from confident ability to self manage her glycemic control on an insulin pump, to current status of forgetfulness, bizarre behavior, and inability to care for herself reliably.  History also notable for recent tepezza use (monoclonal antibody) for Graves ophthalmopathy, not associated with CNS toxicity to my knowledge  GLucose levels in hospital variable, will adjust prandial insulin slightly. SHe is euthyroid on replacement.  SUggest:  neurology consult  MRI (if not done already)

## 2021-08-10 ENCOUNTER — APPOINTMENT (OUTPATIENT)
Dept: PSYCHIATRY | Facility: CLINIC | Age: 71
End: 2021-08-10

## 2021-08-10 LAB
ANION GAP SERPL CALC-SCNC: 7 MMOL/L — SIGNIFICANT CHANGE UP (ref 5–17)
BUN SERPL-MCNC: 23 MG/DL — HIGH (ref 8–20)
CALCIUM SERPL-MCNC: 7.7 MG/DL — LOW (ref 8.6–10.2)
CHLORIDE SERPL-SCNC: 102 MMOL/L — SIGNIFICANT CHANGE UP (ref 98–107)
CO2 SERPL-SCNC: 27 MMOL/L — SIGNIFICANT CHANGE UP (ref 22–29)
CREAT SERPL-MCNC: 0.6 MG/DL — SIGNIFICANT CHANGE UP (ref 0.5–1.3)
GLUCOSE BLDC GLUCOMTR-MCNC: 142 MG/DL — HIGH (ref 70–99)
GLUCOSE BLDC GLUCOMTR-MCNC: 170 MG/DL — HIGH (ref 70–99)
GLUCOSE BLDC GLUCOMTR-MCNC: 182 MG/DL — HIGH (ref 70–99)
GLUCOSE BLDC GLUCOMTR-MCNC: 263 MG/DL — HIGH (ref 70–99)
GLUCOSE BLDC GLUCOMTR-MCNC: 81 MG/DL — SIGNIFICANT CHANGE UP (ref 70–99)
GLUCOSE BLDC GLUCOMTR-MCNC: 87 MG/DL — SIGNIFICANT CHANGE UP (ref 70–99)
GLUCOSE SERPL-MCNC: 213 MG/DL — HIGH (ref 70–99)
HCT VFR BLD CALC: 31 % — LOW (ref 34.5–45)
HGB BLD-MCNC: 10.6 G/DL — LOW (ref 11.5–15.5)
MAGNESIUM SERPL-MCNC: 2 MG/DL — SIGNIFICANT CHANGE UP (ref 1.6–2.6)
MCHC RBC-ENTMCNC: 31.9 PG — SIGNIFICANT CHANGE UP (ref 27–34)
MCHC RBC-ENTMCNC: 34.2 GM/DL — SIGNIFICANT CHANGE UP (ref 32–36)
MCV RBC AUTO: 93.4 FL — SIGNIFICANT CHANGE UP (ref 80–100)
PHOSPHATE SERPL-MCNC: 2.5 MG/DL — SIGNIFICANT CHANGE UP (ref 2.4–4.7)
PLATELET # BLD AUTO: 147 K/UL — LOW (ref 150–400)
POTASSIUM SERPL-MCNC: 4.2 MMOL/L — SIGNIFICANT CHANGE UP (ref 3.5–5.3)
POTASSIUM SERPL-SCNC: 4.2 MMOL/L — SIGNIFICANT CHANGE UP (ref 3.5–5.3)
RBC # BLD: 3.32 M/UL — LOW (ref 3.8–5.2)
RBC # FLD: 14.5 % — SIGNIFICANT CHANGE UP (ref 10.3–14.5)
SODIUM SERPL-SCNC: 136 MMOL/L — SIGNIFICANT CHANGE UP (ref 135–145)
WBC # BLD: 6.84 K/UL — SIGNIFICANT CHANGE UP (ref 3.8–10.5)
WBC # FLD AUTO: 6.84 K/UL — SIGNIFICANT CHANGE UP (ref 3.8–10.5)

## 2021-08-10 PROCEDURE — 99232 SBSQ HOSP IP/OBS MODERATE 35: CPT

## 2021-08-10 RX ADMIN — Medication 4 UNIT(S): at 17:52

## 2021-08-10 RX ADMIN — Medication 2: at 08:52

## 2021-08-10 RX ADMIN — Medication 10 MILLIGRAM(S): at 21:39

## 2021-08-10 RX ADMIN — Medication 4 UNIT(S): at 08:52

## 2021-08-10 RX ADMIN — HEPARIN SODIUM 5000 UNIT(S): 5000 INJECTION INTRAVENOUS; SUBCUTANEOUS at 21:38

## 2021-08-10 RX ADMIN — HEPARIN SODIUM 5000 UNIT(S): 5000 INJECTION INTRAVENOUS; SUBCUTANEOUS at 00:11

## 2021-08-10 RX ADMIN — LOSARTAN POTASSIUM 100 MILLIGRAM(S): 100 TABLET, FILM COATED ORAL at 06:10

## 2021-08-10 RX ADMIN — HEPARIN SODIUM 5000 UNIT(S): 5000 INJECTION INTRAVENOUS; SUBCUTANEOUS at 12:27

## 2021-08-10 RX ADMIN — Medication 81 MILLIGRAM(S): at 12:27

## 2021-08-10 RX ADMIN — CEFTRIAXONE 100 MILLIGRAM(S): 500 INJECTION, POWDER, FOR SOLUTION INTRAMUSCULAR; INTRAVENOUS at 17:49

## 2021-08-10 RX ADMIN — AMLODIPINE BESYLATE 10 MILLIGRAM(S): 2.5 TABLET ORAL at 06:10

## 2021-08-10 RX ADMIN — Medication 2: at 17:50

## 2021-08-10 RX ADMIN — Medication 4 UNIT(S): at 12:24

## 2021-08-10 RX ADMIN — Medication 125 MICROGRAM(S): at 06:10

## 2021-08-10 RX ADMIN — Medication 6: at 12:24

## 2021-08-10 RX ADMIN — INSULIN GLARGINE 15 UNIT(S): 100 INJECTION, SOLUTION SUBCUTANEOUS at 22:41

## 2021-08-10 NOTE — PROGRESS NOTE ADULT - ASSESSMENT
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 ER for evaluation of AMS.        Acute metabolic encephalopathy likely 2/2 UTI vs hypoglycemia w/ hx of progressive behavioral disturbances/dementia  - Repeat UA w/ pyuria w/ leukocytosis  - Leukocytosis now resolved  - c/w Rocephin (D 3/3)  - Initial Ucx (-).  Repeat Ucx pending   - Continue to hold Myrbetriq in case causing urinary retention  - Hypoglecemia resolved   - CTH negative acute changes  - s/p gentle hydration   - TSH, vitamin and ammonia levels WNL  - Outpt documentation reviewed and pt noted to have progressive behavioral disturbances/dementia and following w/ neurology       IDDM w/ impending DKA, resolved   - A1c 9.4  - Hypoglycemic Episodes on admission resolved  - Resumed basal/bolus regimen and titrating to maintain BG <180   - AG and lactic acid WNL   - c/w ISS and hypoglycemic protocol  - Endo following       Normocytic anemia / Thrombocytopenia  - Likely dilutional given hydration   -     HTN Urgency  - BP labile  - c/w home medications and titrate as needed to optimize BP      Hx of CAD / TIA   - c/w ASA and losartan  - Continue to hold HCTZ and resume on discharge      Hypothyroidism, Thyroid Eye Disease  - Tepezza infusions  - c/w Levothyroxine   - TFTs WNL      HLD  - Unable to tolerate statins  - Praluent q2 weeks      VTE ppx: heparin sq    Dispo: pending clinical course 72 y/o F with PMH of IDDM (recently on Insulin pump now on Basal/Bolus regimen), HTN, HLD, Grave's Disease, Hypothyroidism, 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        Acute metabolic encephalopathy likely 2/2 UTI vs hypoglycemia w/ hx of progressive behavioral disturbances/dementia  - Repeat UA w/ pyuria w/ leukocytosis  - Leukocytosis now resolved  - c/w Rocephin (D 3/3)  - Initial Ucx (-).  Repeat Ucx pending   - Continue to hold Myrbetriq in case causing urinary retention  - Hypoglecemia resolved   - CTH negative acute changes  - s/p gentle hydration   - TSH, vitamin and ammonia levels WNL  - Outpt documentation reviewed and pt noted to have progressive behavioral disturbances/dementia and following w/ neurology       IDDM w/ impending DKA, resolved   - A1c 9.4  - Hypoglycemic Episodes on admission resolved  - Resumed basal/bolus regimen and titrating to maintain BG <180   - AG and lactic acid WNL   - c/w ISS and hypoglycemic protocol  - Endo following       Normocytic anemia / Thrombocytopenia  - Likely dilutional given hydration during hospital course  - No signs of active bleeding and hemodynamically stable  - Will monitor CBC and transfuse if Hb <7-8      HTN Urgency  - BP labile  - c/w home medications and titrate as needed to optimize BP      Hx of CAD / TIA   - c/w ASA and losartan  - Continue to hold HCTZ and resume on discharge      Hypothyroidism, Thyroid Eye Disease  - Tepezza infusions  - c/w Levothyroxine   - TFTs WNL      HLD  - Unable to tolerate statins  - Praluent q2 weeks      VTE ppx: heparin sq    Dispo: pending clinical course

## 2021-08-10 NOTE — PROGRESS NOTE ADULT - ASSESSMENT
fairly stable glycemic control at this time. COntinue current regimen. I have not been able to obtain results of MRI as yet, done last week at [facility].

## 2021-08-10 NOTE — PHYSICAL THERAPY INITIAL EVALUATION ADULT - PERTINENT HX OF CURRENT PROBLEM, REHAB EVAL
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

## 2021-08-10 NOTE — PHYSICAL THERAPY INITIAL EVALUATION ADULT - ADDITIONAL COMMENTS
pt states she lives alone in a split level house with 4 steps to enter (no rail), then 6 up and 6 down (+rail). bed bath upstairs. pt amb without device prior to admit. does not drive. son is visiting from out of state.

## 2021-08-11 ENCOUNTER — TRANSCRIPTION ENCOUNTER (OUTPATIENT)
Age: 71
End: 2021-08-11

## 2021-08-11 LAB
-  AMIKACIN: SIGNIFICANT CHANGE UP
-  AMOXICILLIN/CLAVULANIC ACID: SIGNIFICANT CHANGE UP
-  AMPICILLIN/SULBACTAM: SIGNIFICANT CHANGE UP
-  AMPICILLIN: SIGNIFICANT CHANGE UP
-  AZTREONAM: SIGNIFICANT CHANGE UP
-  CEFAZOLIN: SIGNIFICANT CHANGE UP
-  CEFEPIME: SIGNIFICANT CHANGE UP
-  CEFOXITIN: SIGNIFICANT CHANGE UP
-  CEFTRIAXONE: SIGNIFICANT CHANGE UP
-  CIPROFLOXACIN: SIGNIFICANT CHANGE UP
-  ERTAPENEM: SIGNIFICANT CHANGE UP
-  GENTAMICIN: SIGNIFICANT CHANGE UP
-  IMIPENEM: SIGNIFICANT CHANGE UP
-  LEVOFLOXACIN: SIGNIFICANT CHANGE UP
-  MEROPENEM: SIGNIFICANT CHANGE UP
-  NITROFURANTOIN: SIGNIFICANT CHANGE UP
-  PIPERACILLIN/TAZOBACTAM: SIGNIFICANT CHANGE UP
-  TIGECYCLINE: SIGNIFICANT CHANGE UP
-  TOBRAMYCIN: SIGNIFICANT CHANGE UP
-  TRIMETHOPRIM/SULFAMETHOXAZOLE: SIGNIFICANT CHANGE UP
ANION GAP SERPL CALC-SCNC: 8 MMOL/L — SIGNIFICANT CHANGE UP (ref 5–17)
BASOPHILS # BLD AUTO: 0.04 K/UL — SIGNIFICANT CHANGE UP (ref 0–0.2)
BASOPHILS NFR BLD AUTO: 0.4 % — SIGNIFICANT CHANGE UP (ref 0–2)
BUN SERPL-MCNC: 14 MG/DL — SIGNIFICANT CHANGE UP (ref 8–20)
CALCIUM SERPL-MCNC: 8.3 MG/DL — LOW (ref 8.6–10.2)
CHLORIDE SERPL-SCNC: 102 MMOL/L — SIGNIFICANT CHANGE UP (ref 98–107)
CO2 SERPL-SCNC: 28 MMOL/L — SIGNIFICANT CHANGE UP (ref 22–29)
CREAT SERPL-MCNC: 0.53 MG/DL — SIGNIFICANT CHANGE UP (ref 0.5–1.3)
CULTURE RESULTS: SIGNIFICANT CHANGE UP
EOSINOPHIL # BLD AUTO: 0.23 K/UL — SIGNIFICANT CHANGE UP (ref 0–0.5)
EOSINOPHIL NFR BLD AUTO: 2.4 % — SIGNIFICANT CHANGE UP (ref 0–6)
GLUCOSE BLDC GLUCOMTR-MCNC: 211 MG/DL — HIGH (ref 70–99)
GLUCOSE BLDC GLUCOMTR-MCNC: 234 MG/DL — HIGH (ref 70–99)
GLUCOSE BLDC GLUCOMTR-MCNC: 300 MG/DL — HIGH (ref 70–99)
GLUCOSE BLDC GLUCOMTR-MCNC: 326 MG/DL — HIGH (ref 70–99)
GLUCOSE SERPL-MCNC: 214 MG/DL — HIGH (ref 70–99)
HCT VFR BLD CALC: 34.6 % — SIGNIFICANT CHANGE UP (ref 34.5–45)
HGB BLD-MCNC: 11.8 G/DL — SIGNIFICANT CHANGE UP (ref 11.5–15.5)
IMM GRANULOCYTES NFR BLD AUTO: 0.3 % — SIGNIFICANT CHANGE UP (ref 0–1.5)
LYMPHOCYTES # BLD AUTO: 1.73 K/UL — SIGNIFICANT CHANGE UP (ref 1–3.3)
LYMPHOCYTES # BLD AUTO: 18.2 % — SIGNIFICANT CHANGE UP (ref 13–44)
MAGNESIUM SERPL-MCNC: 2 MG/DL — SIGNIFICANT CHANGE UP (ref 1.6–2.6)
MCHC RBC-ENTMCNC: 31.9 PG — SIGNIFICANT CHANGE UP (ref 27–34)
MCHC RBC-ENTMCNC: 34.1 GM/DL — SIGNIFICANT CHANGE UP (ref 32–36)
MCV RBC AUTO: 93.5 FL — SIGNIFICANT CHANGE UP (ref 80–100)
METHOD TYPE: SIGNIFICANT CHANGE UP
MONOCYTES # BLD AUTO: 0.75 K/UL — SIGNIFICANT CHANGE UP (ref 0–0.9)
MONOCYTES NFR BLD AUTO: 7.9 % — SIGNIFICANT CHANGE UP (ref 2–14)
NEUTROPHILS # BLD AUTO: 6.7 K/UL — SIGNIFICANT CHANGE UP (ref 1.8–7.4)
NEUTROPHILS NFR BLD AUTO: 70.8 % — SIGNIFICANT CHANGE UP (ref 43–77)
ORGANISM # SPEC MICROSCOPIC CNT: SIGNIFICANT CHANGE UP
ORGANISM # SPEC MICROSCOPIC CNT: SIGNIFICANT CHANGE UP
PHOSPHATE SERPL-MCNC: 3 MG/DL — SIGNIFICANT CHANGE UP (ref 2.4–4.7)
PLATELET # BLD AUTO: 152 K/UL — SIGNIFICANT CHANGE UP (ref 150–400)
POTASSIUM SERPL-MCNC: 4.3 MMOL/L — SIGNIFICANT CHANGE UP (ref 3.5–5.3)
POTASSIUM SERPL-SCNC: 4.3 MMOL/L — SIGNIFICANT CHANGE UP (ref 3.5–5.3)
RBC # BLD: 3.7 M/UL — LOW (ref 3.8–5.2)
RBC # FLD: 14.3 % — SIGNIFICANT CHANGE UP (ref 10.3–14.5)
SODIUM SERPL-SCNC: 138 MMOL/L — SIGNIFICANT CHANGE UP (ref 135–145)
SPECIMEN SOURCE: SIGNIFICANT CHANGE UP
WBC # BLD: 9.48 K/UL — SIGNIFICANT CHANGE UP (ref 3.8–10.5)
WBC # FLD AUTO: 9.48 K/UL — SIGNIFICANT CHANGE UP (ref 3.8–10.5)

## 2021-08-11 PROCEDURE — 99232 SBSQ HOSP IP/OBS MODERATE 35: CPT

## 2021-08-11 RX ORDER — TAMSULOSIN HYDROCHLORIDE 0.4 MG/1
0.4 CAPSULE ORAL AT BEDTIME
Refills: 0 | Status: DISCONTINUED | OUTPATIENT
Start: 2021-08-11 | End: 2021-08-12

## 2021-08-11 RX ADMIN — Medication 4: at 12:03

## 2021-08-11 RX ADMIN — HEPARIN SODIUM 5000 UNIT(S): 5000 INJECTION INTRAVENOUS; SUBCUTANEOUS at 12:02

## 2021-08-11 RX ADMIN — LOSARTAN POTASSIUM 100 MILLIGRAM(S): 100 TABLET, FILM COATED ORAL at 05:30

## 2021-08-11 RX ADMIN — Medication 4 UNIT(S): at 17:17

## 2021-08-11 RX ADMIN — AMLODIPINE BESYLATE 10 MILLIGRAM(S): 2.5 TABLET ORAL at 05:30

## 2021-08-11 RX ADMIN — Medication 4 UNIT(S): at 08:17

## 2021-08-11 RX ADMIN — INSULIN GLARGINE 15 UNIT(S): 100 INJECTION, SOLUTION SUBCUTANEOUS at 21:51

## 2021-08-11 RX ADMIN — Medication 6: at 08:18

## 2021-08-11 RX ADMIN — TAMSULOSIN HYDROCHLORIDE 0.4 MILLIGRAM(S): 0.4 CAPSULE ORAL at 21:51

## 2021-08-11 RX ADMIN — HEPARIN SODIUM 5000 UNIT(S): 5000 INJECTION INTRAVENOUS; SUBCUTANEOUS at 05:30

## 2021-08-11 RX ADMIN — CEFTRIAXONE 100 MILLIGRAM(S): 500 INJECTION, POWDER, FOR SOLUTION INTRAMUSCULAR; INTRAVENOUS at 08:33

## 2021-08-11 RX ADMIN — Medication 81 MILLIGRAM(S): at 08:18

## 2021-08-11 RX ADMIN — Medication 4 UNIT(S): at 12:03

## 2021-08-11 RX ADMIN — Medication 125 MICROGRAM(S): at 05:30

## 2021-08-11 RX ADMIN — Medication 4: at 17:17

## 2021-08-11 RX ADMIN — HEPARIN SODIUM 5000 UNIT(S): 5000 INJECTION INTRAVENOUS; SUBCUTANEOUS at 21:51

## 2021-08-11 RX ADMIN — Medication 3 MILLIGRAM(S): at 21:51

## 2021-08-11 NOTE — CHART NOTE - NSCHARTNOTEFT_GEN_A_CORE
PA NOTE-MEDICINE    Called by RN due to Pt experiencing PVC's   VSS    Previous Electrolytes WNL  Added Mag/Phos to AM Labs   Continue to monitor  Recall PA if Continues

## 2021-08-11 NOTE — PROGRESS NOTE ADULT - ASSESSMENT
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        Acute metabolic encephalopathy likely 2/2 UTI vs hypoglycemia w/ hx of progressive behavioral disturbances/dementia  - Repeat UA w/ pyuria w/ leukocytosis  - Leukocytosis now resolved  - Completed course of Rocephin   - Ucx (-)  - Hypoglecemia resolved   - CTH negative acute changes  - s/p gentle hydration   - TSH, vitamin and ammonia levels WNL  - Outpt documentation reviewed and pt noted to have progressive behavioral disturbances/dementia and following w/ neurology       IDDM w/ impending DKA, resolved   - A1c 9.4  - Hypoglycemic Episodes on admission resolved  - Resumed basal/bolus regimen and titrating to maintain BG <180   - AG and lactic acid WNL   - c/w ISS and hypoglycemic protocol  - Endo following       Normocytic anemia / Thrombocytopenia  - Likely dilutional given hydration during hospital course  - No signs of active bleeding and hemodynamically stable  - Will monitor CBC and transfuse if Hb <7-8      HTN Urgency  - BP improved since admission  - c/w home medications and titrate as needed to optimize BP      Hx of CAD / TIA   - c/w ASA and losartan  - Continue to hold HCTZ and resume on discharge      Hypothyroidism, Thyroid Eye Disease  - Tepezza infusions  - c/w Levothyroxine   - TFTs WNL      HLD  - Unable to tolerate statins  - Praluent q2 weeks      VTE ppx: heparin sq    Dispo: PT eval noted.  Pt stable for d/c to [facility].

## 2021-08-11 NOTE — DISCHARGE NOTE PROVIDER - NSDCMRMEDTOKEN_GEN_ALL_CORE_FT
aspirin 81 mg oral delayed release tablet: 1 tab(s) orally once a day  hydroCHLOROthiazide 12.5 mg oral capsule: 1 cap(s) orally once a day  insulin lispro 100 units/mL subcutaneous solution:   Lantus 100 units/mL subcutaneous solution: 26 unit(s) subcutaneous once a day (at bedtime)  levothyroxine 125 mcg (0.125 mg) oral tablet: 1 tab(s) orally once a day  losartan 100 mg oral tablet: 1 tab(s) orally once a day  Myrbetriq 50 mg oral tablet, extended release: 1 tab(s) orally once a day  Praluent Pen:   Tepezza:    acetaminophen 325 mg oral tablet: 2 tab(s) orally every 6 hours, As needed, Temp greater or equal to 38.5C (101.3F), Mild Pain (1 - 3)  amLODIPine 10 mg oral tablet: 1 tab(s) orally once a day  aspirin 81 mg oral delayed release tablet: 1 tab(s) orally once a day  hydroCHLOROthiazide 12.5 mg oral capsule: 1 cap(s) orally once a day  insulin lispro 100 units/mL subcutaneous solution:   Lantus 100 units/mL subcutaneous solution: 26 unit(s) subcutaneous once a day (at bedtime)  levothyroxine 125 mcg (0.125 mg) oral tablet: 1 tab(s) orally once a day  losartan 100 mg oral tablet: 1 tab(s) orally once a day  melatonin 3 mg oral tablet: 1 tab(s) orally once a day (at bedtime), As needed, Insomnia  Myrbetriq 50 mg oral tablet, extended release: 1 tab(s) orally once a day  Tepezza:

## 2021-08-11 NOTE — DISCHARGE NOTE PROVIDER - HOSPITAL COURSE
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 Admitted for acute metabolic encephalopathy likely 2/2 UTI vs hypoglycemia w/ hx of progressive behavioral disturbances/dementia. Repeat UA w/ pyuria w/ leukocytosis. Leukocytosis now resolved. Completed course of Rocephin. Ucx (-). Hypoglecemia resolved. CTH negative acute changes. s/p gentle hydration. TSH, vitamin and ammonia levels WNL. Outpt documentation reviewed and pt noted to have progressive behavioral disturbances/dementia and following w/ neurology.     IDDM w/ impending DKA, resolved. A1c 9.4. Hypoglycemic Episodes on admission resolved. Resumed basal/bolus regimen and titrating to maintain BG <180. AG and lactic acid WNL. Seen by endocrine    Normocytic anemia / Thrombocytopenia likely dilutional given hydration during hospital course. 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 Admitted for acute metabolic encephalopathy likely 2/2 UTI vs hypoglycemia w/ hx of progressive behavioral disturbances/dementia. Repeat UA w/ pyuria w/ leukocytosis. Leukocytosis now resolved. Completed course of Rocephin. Ucx (-). Hypoglecemia resolved. CTH negative acute changes. s/p gentle hydration. TSH, vitamin and ammonia levels WNL. Outpt documentation reviewed and pt noted to have progressive behavioral disturbances/dementia and following w/ neurology.     IDDM w/ impending DKA, resolved. A1c 9.4. Hypoglycemic Episodes on admission resolved. Resumed basal/bolus regimen and titrating to maintain BG <180. AG and lactic acid WNL. Seen by endocrine, Pt to follow up ooutpatient. Normocytic anemia / Thrombocytopenia likely dilutional given hydration during hospital course. No signs of active bleeding and hemodynamically stable. HTN Urgency this admission, BP improved since admission. Seen by PT, recommends [facility].    Disposition: Stable for discharge.    Discharge planning time 35 minutes.      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 Admitted for acute metabolic encephalopathy likely 2/2 UTI vs hypoglycemia w/ hx of progressive behavioral disturbances/dementia. Repeat UA w/ pyuria w/ leukocytosis. Leukocytosis now resolved. Completed course of Rocephin. Ucx (-). Hypoglecemia resolved. CTH negative acute changes. s/p gentle hydration. TSH, vitamin and ammonia levels WNL. Outpt documentation reviewed and pt noted to have progressive behavioral disturbances/dementia and following w/ neurology.     IDDM w/ impending DKA, resolved. A1c 9.4. Hypoglycemic Episodes on admission resolved. Resumed basal/bolus regimen and titrating to maintain BG <180. AG and lactic acid WNL. Seen by endocrine, Pt to follow up ooutpatient. Normocytic anemia / Thrombocytopenia likely dilutional given hydration during hospital course. No signs of active bleeding and hemodynamically stable. HTN Urgency this admission, BP improved since admission. Seen by PT, recommends [facility].    Disposition: Stable for discharge.    Discharge planning time 45 minutes.

## 2021-08-11 NOTE — DISCHARGE NOTE PROVIDER - PROVIDER TOKENS
PROVIDER:[TOKEN:[[id number]],FOLLOWUP:[1 week]] PROVIDER:[TOKEN:[[id number]],FOLLOWUP:[1 week]],FREE:[LAST:[Endocrinologist],PHONE:[(   )    -],FAX:[(   )    -],FOLLOWUP:[1 week]],FREE:[LAST:[Primary Care Medical Doctor],PHONE:[(   )    -],FAX:[(   )    -],FOLLOWUP:[1 week]]

## 2021-08-11 NOTE — DISCHARGE NOTE PROVIDER - CARE PROVIDER_API CALL
Quirino Nickerson  ENDOCRINOLOGY/METAB/DIABETES  [address] [place] [address]  Phone: [phone]  Fax: [phone]  Follow Up Time: 1 week   Quirino Nickerson  ENDOCRINOLOGY/METAB/DIABETES  [address] [place] [address]  Phone: [phone]  Fax: [phone]  Follow Up Time: 1 week    Endocrinologist,   Phone: (   )    -  Fax: (   )    -  Follow Up Time: 1 week    Primary Care Medical Doctor,   Phone: (   )    -  Fax: (   )    -  Follow Up Time: 1 week

## 2021-08-11 NOTE — DISCHARGE NOTE PROVIDER - NSDCCPCAREPLAN_GEN_ALL_CORE_FT
PRINCIPAL DISCHARGE DIAGNOSIS  Diagnosis: Encephalopathy  Assessment and Plan of Treatment: Acute metabolic encephalopathy likely 2/2 UTI vs hypoglycemia w/ hx of progressive behavioral disturbances/dementia. Repeat UA w/ pyuria w/ leukocytosis. Leukocytosis now resolved. Completed course of Rocephin. Ucx (-). Hypoglecemia resolved. CTH negative acute changes. s/p gentle hydration. TSH, vitamin and ammonia levels WNL. Outpt documentation reviewed and pt noted to have progressive behavioral disturbances/dementia and following w/ neurology.      SECONDARY DISCHARGE DIAGNOSES  Diagnosis: Encephalopathy  Assessment and Plan of Treatment: Admitted for acute metabolic encephalopathy likely 2/2 UTI vs hypoglycemia w/ hx of progressive behavioral disturbances/dementia. Repeat UA w/ pyuria w/ leukocytosis. Leukocytosis now resolved. Completed course of Rocephin. Ucx (-). Hypoglecemia resolved. CTH negative acute changes. s/p gentle hydration. TSH, vitamin and ammonia levels WNL. Outpt documentation reviewed and pt noted to have progressive behavioral disturbances/dementia and following w/ neurology.       Diagnosis: Hypothyroidism  Assessment and Plan of Treatment: Hypothyroidism, Thyroid Eye Disease. Continue medications as prescribed. Please follow up with your primary care physician and endocrinology within 1 week.    Diagnosis: HLD (hyperlipidemia)  Assessment and Plan of Treatment: Continue medications as prescribed. Please follow up with your primary care physician within 1 week.    Diagnosis: DM (diabetes mellitus)  Assessment and Plan of Treatment: A1c 9.4. Hypoglycemic Episodes on admission resolved. Resumed basal/bolus regimen and titrating to maintain BG <180. AG and lactic acid WNL. Seen by endocrine    Diagnosis: HTN (hypertension)  Assessment and Plan of Treatment: HTN Urgency this admission, BP improved since admission. Continue medications as prescribed. Please follow up with your primary care physician and cardiologist within 1 week.    Diagnosis: UTI (urinary tract infection)  Assessment and Plan of Treatment: Completed course of Rocephin. Ucx (-).    Diagnosis: Thrombocytopenia  Assessment and Plan of Treatment: Likely dilutional given hydration during hospital course. No signs of active bleeding and hemodynamically stable. continue to monitor and follow up outpatient for repeat cbc within 1 week    Diagnosis: CAD (coronary artery disease)  Assessment and Plan of Treatment: Continue medications as prescribed. Please follow up with your primary care physician within 1 week.

## 2021-08-12 ENCOUNTER — TRANSCRIPTION ENCOUNTER (OUTPATIENT)
Age: 71
End: 2021-08-12

## 2021-08-12 VITALS
OXYGEN SATURATION: 96 % | SYSTOLIC BLOOD PRESSURE: 114 MMHG | DIASTOLIC BLOOD PRESSURE: 60 MMHG | RESPIRATION RATE: 18 BRPM | TEMPERATURE: 98 F | HEART RATE: 84 BPM

## 2021-08-12 LAB
ANION GAP SERPL CALC-SCNC: 8 MMOL/L — SIGNIFICANT CHANGE UP (ref 5–17)
BUN SERPL-MCNC: 22 MG/DL — HIGH (ref 8–20)
CALCIUM SERPL-MCNC: 8.4 MG/DL — LOW (ref 8.6–10.2)
CHLORIDE SERPL-SCNC: 99 MMOL/L — SIGNIFICANT CHANGE UP (ref 98–107)
CO2 SERPL-SCNC: 29 MMOL/L — SIGNIFICANT CHANGE UP (ref 22–29)
CREAT SERPL-MCNC: 0.65 MG/DL — SIGNIFICANT CHANGE UP (ref 0.5–1.3)
CULTURE RESULTS: SIGNIFICANT CHANGE UP
CULTURE RESULTS: SIGNIFICANT CHANGE UP
GLUCOSE BLDC GLUCOMTR-MCNC: 338 MG/DL — HIGH (ref 70–99)
GLUCOSE BLDC GLUCOMTR-MCNC: 396 MG/DL — HIGH (ref 70–99)
GLUCOSE BLDC GLUCOMTR-MCNC: 72 MG/DL — SIGNIFICANT CHANGE UP (ref 70–99)
GLUCOSE BLDC GLUCOMTR-MCNC: 90 MG/DL — SIGNIFICANT CHANGE UP (ref 70–99)
GLUCOSE SERPL-MCNC: 194 MG/DL — HIGH (ref 70–99)
HCT VFR BLD CALC: 30.9 % — LOW (ref 34.5–45)
HGB BLD-MCNC: 10.8 G/DL — LOW (ref 11.5–15.5)
MCHC RBC-ENTMCNC: 32 PG — SIGNIFICANT CHANGE UP (ref 27–34)
MCHC RBC-ENTMCNC: 35 GM/DL — SIGNIFICANT CHANGE UP (ref 32–36)
MCV RBC AUTO: 91.4 FL — SIGNIFICANT CHANGE UP (ref 80–100)
PLATELET # BLD AUTO: 159 K/UL — SIGNIFICANT CHANGE UP (ref 150–400)
POTASSIUM SERPL-MCNC: 4.2 MMOL/L — SIGNIFICANT CHANGE UP (ref 3.5–5.3)
POTASSIUM SERPL-SCNC: 4.2 MMOL/L — SIGNIFICANT CHANGE UP (ref 3.5–5.3)
RBC # BLD: 3.38 M/UL — LOW (ref 3.8–5.2)
RBC # FLD: 14.4 % — SIGNIFICANT CHANGE UP (ref 10.3–14.5)
SARS-COV-2 RNA SPEC QL NAA+PROBE: SIGNIFICANT CHANGE UP
SODIUM SERPL-SCNC: 136 MMOL/L — SIGNIFICANT CHANGE UP (ref 135–145)
SPECIMEN SOURCE: SIGNIFICANT CHANGE UP
SPECIMEN SOURCE: SIGNIFICANT CHANGE UP
WBC # BLD: 6.53 K/UL — SIGNIFICANT CHANGE UP (ref 3.8–10.5)
WBC # FLD AUTO: 6.53 K/UL — SIGNIFICANT CHANGE UP (ref 3.8–10.5)

## 2021-08-12 PROCEDURE — 84100 ASSAY OF PHOSPHORUS: CPT

## 2021-08-12 PROCEDURE — 86803 HEPATITIS C AB TEST: CPT

## 2021-08-12 PROCEDURE — 36600 WITHDRAWAL OF ARTERIAL BLOOD: CPT

## 2021-08-12 PROCEDURE — 96375 TX/PRO/DX INJ NEW DRUG ADDON: CPT

## 2021-08-12 PROCEDURE — 81001 URINALYSIS AUTO W/SCOPE: CPT

## 2021-08-12 PROCEDURE — 82010 KETONE BODYS QUAN: CPT

## 2021-08-12 PROCEDURE — 83690 ASSAY OF LIPASE: CPT

## 2021-08-12 PROCEDURE — 97530 THERAPEUTIC ACTIVITIES: CPT

## 2021-08-12 PROCEDURE — 86769 SARS-COV-2 COVID-19 ANTIBODY: CPT

## 2021-08-12 PROCEDURE — 85025 COMPLETE CBC W/AUTO DIFF WBC: CPT

## 2021-08-12 PROCEDURE — 84132 ASSAY OF SERUM POTASSIUM: CPT

## 2021-08-12 PROCEDURE — 85027 COMPLETE CBC AUTOMATED: CPT

## 2021-08-12 PROCEDURE — 36415 COLL VENOUS BLD VENIPUNCTURE: CPT

## 2021-08-12 PROCEDURE — 82746 ASSAY OF FOLIC ACID SERUM: CPT

## 2021-08-12 PROCEDURE — 97163 PT EVAL HIGH COMPLEX 45 MIN: CPT

## 2021-08-12 PROCEDURE — 82962 GLUCOSE BLOOD TEST: CPT

## 2021-08-12 PROCEDURE — 80053 COMPREHEN METABOLIC PANEL: CPT

## 2021-08-12 PROCEDURE — 93005 ELECTROCARDIOGRAM TRACING: CPT

## 2021-08-12 PROCEDURE — 83735 ASSAY OF MAGNESIUM: CPT

## 2021-08-12 PROCEDURE — 82947 ASSAY GLUCOSE BLOOD QUANT: CPT

## 2021-08-12 PROCEDURE — 70450 CT HEAD/BRAIN W/O DYE: CPT | Mod: MG

## 2021-08-12 PROCEDURE — 84295 ASSAY OF SERUM SODIUM: CPT

## 2021-08-12 PROCEDURE — 87086 URINE CULTURE/COLONY COUNT: CPT

## 2021-08-12 PROCEDURE — U0003: CPT

## 2021-08-12 PROCEDURE — 96374 THER/PROPH/DIAG INJ IV PUSH: CPT

## 2021-08-12 PROCEDURE — 82803 BLOOD GASES ANY COMBINATION: CPT

## 2021-08-12 PROCEDURE — 82330 ASSAY OF CALCIUM: CPT

## 2021-08-12 PROCEDURE — 83036 HEMOGLOBIN GLYCOSYLATED A1C: CPT

## 2021-08-12 PROCEDURE — 82435 ASSAY OF BLOOD CHLORIDE: CPT

## 2021-08-12 PROCEDURE — 71045 X-RAY EXAM CHEST 1 VIEW: CPT

## 2021-08-12 PROCEDURE — 82607 VITAMIN B-12: CPT

## 2021-08-12 PROCEDURE — 87040 BLOOD CULTURE FOR BACTERIA: CPT

## 2021-08-12 PROCEDURE — 82009 KETONE BODYS QUAL: CPT

## 2021-08-12 PROCEDURE — 84443 ASSAY THYROID STIM HORMONE: CPT

## 2021-08-12 PROCEDURE — U0005: CPT

## 2021-08-12 PROCEDURE — 99232 SBSQ HOSP IP/OBS MODERATE 35: CPT

## 2021-08-12 PROCEDURE — 80307 DRUG TEST PRSMV CHEM ANLYZR: CPT

## 2021-08-12 PROCEDURE — 82550 ASSAY OF CK (CPK): CPT

## 2021-08-12 PROCEDURE — 82140 ASSAY OF AMMONIA: CPT

## 2021-08-12 PROCEDURE — 80048 BASIC METABOLIC PNL TOTAL CA: CPT

## 2021-08-12 PROCEDURE — 83605 ASSAY OF LACTIC ACID: CPT

## 2021-08-12 PROCEDURE — 99285 EMERGENCY DEPT VISIT HI MDM: CPT | Mod: 25

## 2021-08-12 PROCEDURE — 87186 SC STD MICRODIL/AGAR DIL: CPT

## 2021-08-12 PROCEDURE — 85014 HEMATOCRIT: CPT

## 2021-08-12 PROCEDURE — 84484 ASSAY OF TROPONIN QUANT: CPT

## 2021-08-12 PROCEDURE — 99239 HOSP IP/OBS DSCHRG MGMT >30: CPT

## 2021-08-12 PROCEDURE — 85018 HEMOGLOBIN: CPT

## 2021-08-12 PROCEDURE — G1004: CPT

## 2021-08-12 RX ORDER — INSULIN HUMAN 100 [IU]/ML
5 INJECTION, SOLUTION SUBCUTANEOUS ONCE
Refills: 0 | Status: COMPLETED | OUTPATIENT
Start: 2021-08-12 | End: 2021-08-12

## 2021-08-12 RX ORDER — LANOLIN ALCOHOL/MO/W.PET/CERES
1 CREAM (GRAM) TOPICAL
Qty: 0 | Refills: 0 | DISCHARGE
Start: 2021-08-12

## 2021-08-12 RX ORDER — AMLODIPINE BESYLATE 2.5 MG/1
1 TABLET ORAL
Qty: 0 | Refills: 0 | DISCHARGE
Start: 2021-08-12

## 2021-08-12 RX ORDER — INSULIN LISPRO 100/ML
5 VIAL (ML) SUBCUTANEOUS
Refills: 0 | Status: DISCONTINUED | OUTPATIENT
Start: 2021-08-12 | End: 2021-08-12

## 2021-08-12 RX ORDER — INSULIN GLARGINE 100 [IU]/ML
20 INJECTION, SOLUTION SUBCUTANEOUS AT BEDTIME
Refills: 0 | Status: DISCONTINUED | OUTPATIENT
Start: 2021-08-12 | End: 2021-08-12

## 2021-08-12 RX ORDER — ACETAMINOPHEN 500 MG
2 TABLET ORAL
Qty: 0 | Refills: 0 | DISCHARGE
Start: 2021-08-12

## 2021-08-12 RX ORDER — INSULIN LISPRO 100/ML
6 VIAL (ML) SUBCUTANEOUS
Refills: 0 | Status: DISCONTINUED | OUTPATIENT
Start: 2021-08-12 | End: 2021-08-12

## 2021-08-12 RX ADMIN — HEPARIN SODIUM 5000 UNIT(S): 5000 INJECTION INTRAVENOUS; SUBCUTANEOUS at 05:19

## 2021-08-12 RX ADMIN — Medication 125 MICROGRAM(S): at 05:19

## 2021-08-12 RX ADMIN — Medication 5 UNIT(S): at 17:48

## 2021-08-12 RX ADMIN — Medication 10: at 17:48

## 2021-08-12 RX ADMIN — HEPARIN SODIUM 5000 UNIT(S): 5000 INJECTION INTRAVENOUS; SUBCUTANEOUS at 13:23

## 2021-08-12 RX ADMIN — LOSARTAN POTASSIUM 100 MILLIGRAM(S): 100 TABLET, FILM COATED ORAL at 05:19

## 2021-08-12 RX ADMIN — Medication 4 UNIT(S): at 08:33

## 2021-08-12 RX ADMIN — Medication 81 MILLIGRAM(S): at 13:23

## 2021-08-12 RX ADMIN — Medication 8: at 08:33

## 2021-08-12 RX ADMIN — AMLODIPINE BESYLATE 10 MILLIGRAM(S): 2.5 TABLET ORAL at 05:19

## 2021-08-12 NOTE — PROGRESS NOTE ADULT - SUBJECTIVE AND OBJECTIVE BOX
Chief Complaint:  AMS    SUBJECTIVE / OVERNIGHT EVENTS:  Afew PVCs reported overnight, pt asymptomatic.  Pt poor historian but mentation improved and at baseline.  Pt offers no acute complaints at this time.     Patient denies chest pain, SOB, abd pain, N/V, fever, chills, dysuria or any other complaints. All remainder ROS negative.         PHYSICAL EXAM:  Vital Signs Last 24 Hrs  T(C): 37 (12 Aug 2021 05:17), Max: 37 (12 Aug 2021 05:17)  T(F): 98.6 (12 Aug 2021 05:17), Max: 98.6 (12 Aug 2021 05:17)  HR: 81 (12 Aug 2021 09:04) (66 - 81)  BP: 101/54 (12 Aug 2021 09:04) (94/53 - 137/97)  BP(mean): --  RR: 19 (12 Aug 2021 09:04) (17 - 19)  SpO2: 97% (12 Aug 2021 09:04) (95% - 98%)      GENERAL: elderly female, examined bedside, laying comfortably in bed in NAD  HEENT: NC/AT, moist oral mucosa, clear conjunctiva, sclera nonicteric  RESPIRATORY: Normal respiratory effort; CTA b/l, no wheezing, rhonchi, rales  CARDIOVASCULAR: RRR, normal S1 and S2, no murmur/rub/gallop  ABDOMEN: soft, NT/ND, normoactive bowel sounds, no rebound/guarding  EXTREMITIES: No cynaosis, no clubbing, no lower extremity edema; Peripheral pulses are 2+ bilaterally  PSYCH: affect appropriate and cooperative  NEUROLOGY: A+O to self and place, no focal neurologic deficits appreciated   SKIN: No rashes or no palpable lesions        LABS:                                         10.8   6.53  )-----------( 159      ( 12 Aug 2021 06:47 )             30.9       08-12    136  |  99  |  22.0<H>  ----------------------------<  194<H>  4.2   |  29.0  |  0.65    Ca    8.4<L>      12 Aug 2021 06:47  Phos  3.0     08-11  Mg     2.0     08-11          Urinalysis Basic - ( 08 Aug 2021 02:48 )    Color: Pale Yellow / Appearance: Cloudy / S.010 / pH: x  Gluc: x / Ketone: Small  / Bili: Negative / Urobili: Negative mg/dL   Blood: x / Protein: 30 mg/dL / Nitrite: Negative   Leuk Esterase: Moderate / RBC: 3-5 /HPF / WBC 11-25   Sq Epi: x / Non Sq Epi: Occasional / Bacteria: Few        Culture - Urine (collected 07 Aug 2021 03:11)  Source: Clean Catch Clean Catch (Midstream)  Final Report (07 Aug 2021 22:00):    <10,000 CFU/mL Normal Urogenital Nohemi      CAPILLARY BLOOD GLUCOSE      POCT Blood Glucose.: 443 mg/dL (08 Aug 2021 07:59)  POCT Blood Glucose.: 361 mg/dL (07 Aug 2021 16:02)  POCT Blood Glucose.: 129 mg/dL (07 Aug 2021 11:10)        RADIOLOGY & ADDITIONAL TESTS:          MEDICATIONS  (STANDING):  amLODIPine   Tablet 10 milliGRAM(s) Oral daily  aspirin enteric coated 81 milliGRAM(s) Oral daily  cefTRIAXone   IVPB      cefTRIAXone   IVPB 1000 milliGRAM(s) IV Intermittent once  dextrose 40% Gel 15 Gram(s) Oral once  dextrose 5%. 1000 milliLiter(s) (50 mL/Hr) IV Continuous <Continuous>  dextrose 5%. 1000 milliLiter(s) (100 mL/Hr) IV Continuous <Continuous>  dextrose 50% Injectable 25 Gram(s) IV Push once  dextrose 50% Injectable 12.5 Gram(s) IV Push once  dextrose 50% Injectable 25 Gram(s) IV Push once  glucagon  Injectable 1 milliGRAM(s) IntraMuscular once  heparin   Injectable 5000 Unit(s) SubCutaneous every 8 hours  insulin glargine Injectable (LANTUS) 10 Unit(s) SubCutaneous at bedtime  insulin lispro (ADMELOG) corrective regimen sliding scale   SubCutaneous three times a day before meals  lactated ringers. 1000 milliLiter(s) (100 mL/Hr) IV Continuous <Continuous>  levothyroxine 125 MICROGram(s) Oral daily  losartan 100 milliGRAM(s) Oral daily    MEDICATIONS  (PRN):  acetaminophen   Tablet .. 650 milliGRAM(s) Oral every 6 hours PRN Temp greater or equal to 38.5C (101.3F), Mild Pain (1 - 3)  aluminum hydroxide/magnesium hydroxide/simethicone Suspension 30 milliLiter(s) Oral every 4 hours PRN Dyspepsia  hydrALAZINE Injectable 10 milliGRAM(s) IV Push every 8 hours PRN SBP >160  melatonin 3 milliGRAM(s) Oral at bedtime PRN Insomnia  ondansetron Injectable 4 milliGRAM(s) IV Push every 8 hours PRN Nausea and/or Vomiting                                  
INTERVAL HPI/OVERNIGHT EVENTS: followup of diabetes    MEDICATIONS  (STANDING):  amLODIPine   Tablet 10 milliGRAM(s) Oral daily  aspirin enteric coated 81 milliGRAM(s) Oral daily  cefTRIAXone   IVPB      cefTRIAXone   IVPB 1000 milliGRAM(s) IV Intermittent every 24 hours  dextrose 40% Gel 15 Gram(s) Oral once  dextrose 5%. 1000 milliLiter(s) (50 mL/Hr) IV Continuous <Continuous>  dextrose 5%. 1000 milliLiter(s) (100 mL/Hr) IV Continuous <Continuous>  dextrose 50% Injectable 25 Gram(s) IV Push once  dextrose 50% Injectable 12.5 Gram(s) IV Push once  dextrose 50% Injectable 25 Gram(s) IV Push once  glucagon  Injectable 1 milliGRAM(s) IntraMuscular once  heparin   Injectable 5000 Unit(s) SubCutaneous every 8 hours  insulin glargine Injectable (LANTUS) 15 Unit(s) SubCutaneous at bedtime  insulin lispro (ADMELOG) corrective regimen sliding scale   SubCutaneous three times a day before meals  insulin lispro Injectable (ADMELOG) 3 Unit(s) SubCutaneous three times a day before meals  insulin regular  human recombinant. 10 Unit(s) SubCutaneous once  lactated ringers. 1000 milliLiter(s) (100 mL/Hr) IV Continuous <Continuous>  levothyroxine 125 MICROGram(s) Oral daily  losartan 100 milliGRAM(s) Oral daily    MEDICATIONS  (PRN):  acetaminophen   Tablet .. 650 milliGRAM(s) Oral every 6 hours PRN Temp greater or equal to 38.5C (101.3F), Mild Pain (1 - 3)  aluminum hydroxide/magnesium hydroxide/simethicone Suspension 30 milliLiter(s) Oral every 4 hours PRN Dyspepsia  hydrALAZINE Injectable 10 milliGRAM(s) IV Push every 8 hours PRN SBP >160  melatonin 3 milliGRAM(s) Oral at bedtime PRN Insomnia  ondansetron Injectable 4 milliGRAM(s) IV Push every 8 hours PRN Nausea and/or Vomiting      Allergies    No Known Allergies    Intolerances        Vital Signs Last 24 Hrs  T(C): 36.5 (09 Aug 2021 11:00), Max: 37 (08 Aug 2021 15:49)  T(F): 97.7 (09 Aug 2021 11:00), Max: 98.6 (08 Aug 2021 15:49)  HR: 76 (09 Aug 2021 11:00) (53 - 76)  BP: 99/51 (09 Aug 2021 11:00) (99/51 - 176/61)  BP(mean): --  RR: 18 (09 Aug 2021 11:00) (15 - 20)  SpO2: 100% (09 Aug 2021 11:00) (93% - 100%)        LABS:                        11.6   9.03  )-----------( 161      ( 09 Aug 2021 06:42 )             33.8         136  |  100  |  26.0<H>  ----------------------------<  245<H>  4.1   |  27.0  |  0.67    Ca    8.0<L>      09 Aug 2021 06:42  Phos  2.3       Mg     1.9         TPro  5.9<L>  /  Alb  3.4  /  TBili  0.6  /  DBili  x   /  AST  21  /  ALT  21  /  AlkPhos  64  0808    Urinalysis Basic - ( 08 Aug 2021 02:48 )    Color: Pale Yellow / Appearance: Cloudy / S.010 / pH: x  Gluc: x / Ketone: Small  / Bili: Negative / Urobili: Negative mg/dL   Blood: x / Protein: 30 mg/dL / Nitrite: Negative   Leuk Esterase: Moderate / RBC: 3-5 /HPF / WBC 11-25   Sq Epi: x / Non Sq Epi: Occasional / Bacteria: Few          POCT Blood Glucose.: 225 mg/dL (21 @ 12:06)  POCT Blood Glucose.: 236 mg/dL (21 @ 08:23)  POCT Blood Glucose.: 262 mg/dL (21 @ 21:43)  POCT Blood Glucose.: 460 mg/dL (21 @ 16:13)  POCT Blood Glucose.: 379 mg/dL (21 @ 11:10)  POCT Blood Glucose.: 443 mg/dL (21 @ 07:59)  POCT Blood Glucose.: 361 mg/dL (21 @ 16:02)  POCT Blood Glucose.: 129 mg/dL (21 @ 11:10)  POCT Blood Glucose.: 181 mg/dL (21 @ 08:10)  POCT Blood Glucose.: 177 mg/dL (21 @ 06:31)  POCT Blood Glucose.: 151 mg/dL (21 @ 01:52)  POCT Blood Glucose.: 58 mg/dL (21 @ 00:26)  POCT Blood Glucose.: 212 mg/dL (21 @ 18:19)        
  Chief Complaint:  AMS    SUBJECTIVE / OVERNIGHT EVENTS:  No acute events reported overnight.  Pt poor historian but mentation improved and at baseline.  Pt offers no acute complaints at this time.     Patient denies chest pain, SOB, abd pain, N/V, fever, chills, dysuria or any other complaints. All remainder ROS negative.       I&O's Summary    07 Aug 2021 07:01  -  08 Aug 2021 07:00  --------------------------------------------------------  IN: 0 mL / OUT: 500 mL / NET: -500 mL          PHYSICAL EXAM:  Vital Signs Last 24 Hrs  T(C): 36.4 (10 Aug 2021 15:30), Max: 36.6 (10 Aug 2021 10:50)  T(F): 97.5 (10 Aug 2021 15:30), Max: 97.8 (10 Aug 2021 10:50)  HR: 69 (10 Aug 2021 15:30) (51 - 69)  BP: 108/60 (10 Aug 2021 15:30) (108/60 - 165/64)  BP(mean): --  RR: 18 (10 Aug 2021 10:50) (16 - 18)  SpO2: 97% (10 Aug 2021 15:30) (97% - 98%)      GENERAL: elderly female, examined bedside, laying comfortably in bed in NAD  HEENT: NC/AT, moist oral mucosa, clear conjunctiva, sclera nonicteric  RESPIRATORY: Normal respiratory effort; CTA b/l, no wheezing, rhonchi, rales  CARDIOVASCULAR: RRR, normal S1 and S2, no murmur/rub/gallop  ABDOMEN: soft, NT/ND, normoactive bowel sounds, no rebound/guarding  EXTREMITIES: No cynaosis, no clubbing, no lower extremity edema; Peripheral pulses are 2+ bilaterally  PSYCH: affect appropriate and cooperative  NEUROLOGY: A+O to self and place, no focal neurologic deficits appreciated   SKIN: No rashes or no palpable lesions        LABS:                                        10.6   6.84  )-----------( 147      ( 10 Aug 2021 06:32 )             31.0     08-10    136  |  102  |  23.0<H>  ----------------------------<  213<H>  4.2   |  27.0  |  0.60    Ca    7.7<L>      10 Aug 2021 06:32  Phos  2.5     08-10  Mg     2.0     08-10        Urinalysis Basic - ( 08 Aug 2021 02:48 )    Color: Pale Yellow / Appearance: Cloudy / S.010 / pH: x  Gluc: x / Ketone: Small  / Bili: Negative / Urobili: Negative mg/dL   Blood: x / Protein: 30 mg/dL / Nitrite: Negative   Leuk Esterase: Moderate / RBC: 3-5 /HPF / WBC 11-25   Sq Epi: x / Non Sq Epi: Occasional / Bacteria: Few        Culture - Urine (collected 07 Aug 2021 03:11)  Source: Clean Catch Clean Catch (Midstream)  Final Report (07 Aug 2021 22:00):    <10,000 CFU/mL Normal Urogenital Nohemi      CAPILLARY BLOOD GLUCOSE      POCT Blood Glucose.: 443 mg/dL (08 Aug 2021 07:59)  POCT Blood Glucose.: 361 mg/dL (07 Aug 2021 16:02)  POCT Blood Glucose.: 129 mg/dL (07 Aug 2021 11:10)        RADIOLOGY & ADDITIONAL TESTS:          MEDICATIONS  (STANDING):  amLODIPine   Tablet 10 milliGRAM(s) Oral daily  aspirin enteric coated 81 milliGRAM(s) Oral daily  cefTRIAXone   IVPB      cefTRIAXone   IVPB 1000 milliGRAM(s) IV Intermittent once  dextrose 40% Gel 15 Gram(s) Oral once  dextrose 5%. 1000 milliLiter(s) (50 mL/Hr) IV Continuous <Continuous>  dextrose 5%. 1000 milliLiter(s) (100 mL/Hr) IV Continuous <Continuous>  dextrose 50% Injectable 25 Gram(s) IV Push once  dextrose 50% Injectable 12.5 Gram(s) IV Push once  dextrose 50% Injectable 25 Gram(s) IV Push once  glucagon  Injectable 1 milliGRAM(s) IntraMuscular once  heparin   Injectable 5000 Unit(s) SubCutaneous every 8 hours  insulin glargine Injectable (LANTUS) 10 Unit(s) SubCutaneous at bedtime  insulin lispro (ADMELOG) corrective regimen sliding scale   SubCutaneous three times a day before meals  lactated ringers. 1000 milliLiter(s) (100 mL/Hr) IV Continuous <Continuous>  levothyroxine 125 MICROGram(s) Oral daily  losartan 100 milliGRAM(s) Oral daily    MEDICATIONS  (PRN):  acetaminophen   Tablet .. 650 milliGRAM(s) Oral every 6 hours PRN Temp greater or equal to 38.5C (101.3F), Mild Pain (1 - 3)  aluminum hydroxide/magnesium hydroxide/simethicone Suspension 30 milliLiter(s) Oral every 4 hours PRN Dyspepsia  hydrALAZINE Injectable 10 milliGRAM(s) IV Push every 8 hours PRN SBP >160  melatonin 3 milliGRAM(s) Oral at bedtime PRN Insomnia  ondansetron Injectable 4 milliGRAM(s) IV Push every 8 hours PRN Nausea and/or Vomiting                                  
  Chief Complaint:  AMS    SUBJECTIVE / OVERNIGHT EVENTS:  No acute events reported overnight.  Pt poor historian but mentation improved and at baseline.  Pt offers no acute complaints at this time.     Patient denies chest pain, SOB, abd pain, N/V, fever, chills, dysuria or any other complaints. All remainder ROS negative.       I&O's Summary    07 Aug 2021 07:01  -  08 Aug 2021 07:00  --------------------------------------------------------  IN: 0 mL / OUT: 500 mL / NET: -500 mL          PHYSICAL EXAM:  Vital Signs Last 24 Hrs  T(C): 36.5 (09 Aug 2021 11:00), Max: 37 (08 Aug 2021 15:49)  T(F): 97.7 (09 Aug 2021 11:00), Max: 98.6 (08 Aug 2021 15:49)  HR: 76 (09 Aug 2021 11:00) (53 - 76)  BP: 99/51 (09 Aug 2021 11:00) (99/51 - 176/61)  BP(mean): --  RR: 18 (09 Aug 2021 11:00) (15 - 20)  SpO2: 100% (09 Aug 2021 11:00) (93% - 100%)      GENERAL: elderly female, examined bedside, laying comfortably in bed in NAD  HEENT: NC/AT, moist oral mucosa, clear conjunctiva, sclera nonicteric  RESPIRATORY: Normal respiratory effort; CTA b/l, no wheezing, rhonchi, rales  CARDIOVASCULAR: RRR, normal S1 and S2, no murmur/rub/gallop  ABDOMEN: soft, NT/ND, normoactive bowel sounds, no rebound/guarding  EXTREMITIES: No cynaosis, no clubbing, no lower extremity edema; Peripheral pulses are 2+ bilaterally  PSYCH: affect appropriate and cooperative  NEUROLOGY: A+O to self and place, no focal neurologic deficits appreciated   SKIN: No rashes or no palpable lesions        LABS:                                         11.6   9.03  )-----------( 161      ( 09 Aug 2021 06:42 )             33.8           136  |  100  |  26.0<H>  ----------------------------<  245<H>  4.1   |  27.0  |  0.67    Ca    8.0<L>      09 Aug 2021 06:42  Phos  2.3     -  Mg     1.9     -    TPro  5.9<L>  /  Alb  3.4  /  TBili  0.6  /  DBili  x   /  AST  21  /  ALT  21  /  AlkPhos  64        Urinalysis Basic - ( 08 Aug 2021 02:48 )    Color: Pale Yellow / Appearance: Cloudy / S.010 / pH: x  Gluc: x / Ketone: Small  / Bili: Negative / Urobili: Negative mg/dL   Blood: x / Protein: 30 mg/dL / Nitrite: Negative   Leuk Esterase: Moderate / RBC: 3-5 /HPF / WBC 11-25   Sq Epi: x / Non Sq Epi: Occasional / Bacteria: Few        Culture - Urine (collected 07 Aug 2021 03:11)  Source: Clean Catch Clean Catch (Midstream)  Final Report (07 Aug 2021 22:00):    <10,000 CFU/mL Normal Urogenital Nohemi      CAPILLARY BLOOD GLUCOSE      POCT Blood Glucose.: 443 mg/dL (08 Aug 2021 07:59)  POCT Blood Glucose.: 361 mg/dL (07 Aug 2021 16:02)  POCT Blood Glucose.: 129 mg/dL (07 Aug 2021 11:10)        RADIOLOGY & ADDITIONAL TESTS:          MEDICATIONS  (STANDING):  amLODIPine   Tablet 10 milliGRAM(s) Oral daily  aspirin enteric coated 81 milliGRAM(s) Oral daily  cefTRIAXone   IVPB      cefTRIAXone   IVPB 1000 milliGRAM(s) IV Intermittent once  dextrose 40% Gel 15 Gram(s) Oral once  dextrose 5%. 1000 milliLiter(s) (50 mL/Hr) IV Continuous <Continuous>  dextrose 5%. 1000 milliLiter(s) (100 mL/Hr) IV Continuous <Continuous>  dextrose 50% Injectable 25 Gram(s) IV Push once  dextrose 50% Injectable 12.5 Gram(s) IV Push once  dextrose 50% Injectable 25 Gram(s) IV Push once  glucagon  Injectable 1 milliGRAM(s) IntraMuscular once  heparin   Injectable 5000 Unit(s) SubCutaneous every 8 hours  insulin glargine Injectable (LANTUS) 10 Unit(s) SubCutaneous at bedtime  insulin lispro (ADMELOG) corrective regimen sliding scale   SubCutaneous three times a day before meals  lactated ringers. 1000 milliLiter(s) (100 mL/Hr) IV Continuous <Continuous>  levothyroxine 125 MICROGram(s) Oral daily  losartan 100 milliGRAM(s) Oral daily    MEDICATIONS  (PRN):  acetaminophen   Tablet .. 650 milliGRAM(s) Oral every 6 hours PRN Temp greater or equal to 38.5C (101.3F), Mild Pain (1 - 3)  aluminum hydroxide/magnesium hydroxide/simethicone Suspension 30 milliLiter(s) Oral every 4 hours PRN Dyspepsia  hydrALAZINE Injectable 10 milliGRAM(s) IV Push every 8 hours PRN SBP >160  melatonin 3 milliGRAM(s) Oral at bedtime PRN Insomnia  ondansetron Injectable 4 milliGRAM(s) IV Push every 8 hours PRN Nausea and/or Vomiting                                  
  Chief Complaint:  AMS    SUBJECTIVE / OVERNIGHT EVENTS:  Afew PVCs reported overnight, pt asymptomatic.  Pt poor historian but mentation improved and at baseline.  Pt offers no acute complaints at this time.     Patient denies chest pain, SOB, abd pain, N/V, fever, chills, dysuria or any other complaints. All remainder ROS negative.       I&O's Summary    07 Aug 2021 07:01  -  08 Aug 2021 07:00  --------------------------------------------------------  IN: 0 mL / OUT: 500 mL / NET: -500 mL          PHYSICAL EXAM:  Vital Signs Last 24 Hrs  T(C): 36.3 (11 Aug 2021 08:22), Max: 36.6 (11 Aug 2021 05:21)  T(F): 97.4 (11 Aug 2021 08:22), Max: 97.8 (11 Aug 2021 05:21)  HR: 76 (11 Aug 2021 08:22) (51 - 76)  BP: 124/58 (11 Aug 2021 08:22) (108/60 - 166/60)  BP(mean): --  RR: 17 (11 Aug 2021 08:22) (16 - 18)  SpO2: 97% (11 Aug 2021 08:22) (97% - 98%)      GENERAL: elderly female, examined bedside, laying comfortably in bed in NAD  HEENT: NC/AT, moist oral mucosa, clear conjunctiva, sclera nonicteric  RESPIRATORY: Normal respiratory effort; CTA b/l, no wheezing, rhonchi, rales  CARDIOVASCULAR: RRR, normal S1 and S2, no murmur/rub/gallop  ABDOMEN: soft, NT/ND, normoactive bowel sounds, no rebound/guarding  EXTREMITIES: No cynaosis, no clubbing, no lower extremity edema; Peripheral pulses are 2+ bilaterally  PSYCH: affect appropriate and cooperative  NEUROLOGY: A+O to self and place, no focal neurologic deficits appreciated   SKIN: No rashes or no palpable lesions        LABS:                                          11.8   9.48  )-----------( 152      ( 11 Aug 2021 07:12 )             34.6       08-11    138  |  102  |  14.0  ----------------------------<  214<H>  4.3   |  28.0  |  0.53    Ca    8.3<L>      11 Aug 2021 07:12  Phos  3.0       Mg     2.0             Urinalysis Basic - ( 08 Aug 2021 02:48 )    Color: Pale Yellow / Appearance: Cloudy / S.010 / pH: x  Gluc: x / Ketone: Small  / Bili: Negative / Urobili: Negative mg/dL   Blood: x / Protein: 30 mg/dL / Nitrite: Negative   Leuk Esterase: Moderate / RBC: 3-5 /HPF / WBC 11-25   Sq Epi: x / Non Sq Epi: Occasional / Bacteria: Few        Culture - Urine (collected 07 Aug 2021 03:11)  Source: Clean Catch Clean Catch (Midstream)  Final Report (07 Aug 2021 22:00):    <10,000 CFU/mL Normal Urogenital Nohemi      CAPILLARY BLOOD GLUCOSE      POCT Blood Glucose.: 443 mg/dL (08 Aug 2021 07:59)  POCT Blood Glucose.: 361 mg/dL (07 Aug 2021 16:02)  POCT Blood Glucose.: 129 mg/dL (07 Aug 2021 11:10)        RADIOLOGY & ADDITIONAL TESTS:          MEDICATIONS  (STANDING):  amLODIPine   Tablet 10 milliGRAM(s) Oral daily  aspirin enteric coated 81 milliGRAM(s) Oral daily  cefTRIAXone   IVPB      cefTRIAXone   IVPB 1000 milliGRAM(s) IV Intermittent once  dextrose 40% Gel 15 Gram(s) Oral once  dextrose 5%. 1000 milliLiter(s) (50 mL/Hr) IV Continuous <Continuous>  dextrose 5%. 1000 milliLiter(s) (100 mL/Hr) IV Continuous <Continuous>  dextrose 50% Injectable 25 Gram(s) IV Push once  dextrose 50% Injectable 12.5 Gram(s) IV Push once  dextrose 50% Injectable 25 Gram(s) IV Push once  glucagon  Injectable 1 milliGRAM(s) IntraMuscular once  heparin   Injectable 5000 Unit(s) SubCutaneous every 8 hours  insulin glargine Injectable (LANTUS) 10 Unit(s) SubCutaneous at bedtime  insulin lispro (ADMELOG) corrective regimen sliding scale   SubCutaneous three times a day before meals  lactated ringers. 1000 milliLiter(s) (100 mL/Hr) IV Continuous <Continuous>  levothyroxine 125 MICROGram(s) Oral daily  losartan 100 milliGRAM(s) Oral daily    MEDICATIONS  (PRN):  acetaminophen   Tablet .. 650 milliGRAM(s) Oral every 6 hours PRN Temp greater or equal to 38.5C (101.3F), Mild Pain (1 - 3)  aluminum hydroxide/magnesium hydroxide/simethicone Suspension 30 milliLiter(s) Oral every 4 hours PRN Dyspepsia  hydrALAZINE Injectable 10 milliGRAM(s) IV Push every 8 hours PRN SBP >160  melatonin 3 milliGRAM(s) Oral at bedtime PRN Insomnia  ondansetron Injectable 4 milliGRAM(s) IV Push every 8 hours PRN Nausea and/or Vomiting                                  
INTERVAL HPI/OVERNIGHT EVENTS:  followup for dm1 management     MEDICATIONS  (STANDING):  amLODIPine   Tablet 10 milliGRAM(s) Oral daily  aspirin enteric coated 81 milliGRAM(s) Oral daily  cefTRIAXone   IVPB      dextrose 40% Gel 15 Gram(s) Oral once  dextrose 5%. 1000 milliLiter(s) (50 mL/Hr) IV Continuous <Continuous>  dextrose 5%. 1000 milliLiter(s) (100 mL/Hr) IV Continuous <Continuous>  dextrose 50% Injectable 25 Gram(s) IV Push once  dextrose 50% Injectable 12.5 Gram(s) IV Push once  dextrose 50% Injectable 25 Gram(s) IV Push once  glucagon  Injectable 1 milliGRAM(s) IntraMuscular once  heparin   Injectable 5000 Unit(s) SubCutaneous every 8 hours  insulin glargine Injectable (LANTUS) 10 Unit(s) SubCutaneous at bedtime  insulin lispro (ADMELOG) corrective regimen sliding scale   SubCutaneous three times a day before meals  insulin lispro Injectable (ADMELOG) 3 Unit(s) SubCutaneous three times a day before meals  insulin regular  human recombinant. 10 Unit(s) SubCutaneous once  insulin regular  human recombinant. 10 Unit(s) SubCutaneous once  lactated ringers. 1000 milliLiter(s) (100 mL/Hr) IV Continuous <Continuous>  levothyroxine 125 MICROGram(s) Oral daily  losartan 100 milliGRAM(s) Oral daily  sodium chloride 0.9%. 1000 milliLiter(s) (100 mL/Hr) IV Continuous <Continuous>    MEDICATIONS  (PRN):  acetaminophen   Tablet .. 650 milliGRAM(s) Oral every 6 hours PRN Temp greater or equal to 38.5C (101.3F), Mild Pain (1 - 3)  aluminum hydroxide/magnesium hydroxide/simethicone Suspension 30 milliLiter(s) Oral every 4 hours PRN Dyspepsia  hydrALAZINE Injectable 10 milliGRAM(s) IV Push every 8 hours PRN SBP >160  melatonin 3 milliGRAM(s) Oral at bedtime PRN Insomnia  ondansetron Injectable 4 milliGRAM(s) IV Push every 8 hours PRN Nausea and/or Vomiting      Allergies    No Known Allergies    Review of systems: no CP, no SOb, no HA     Vital Signs Last 24 Hrs  T(C): 37 (08 Aug 2021 15:49), Max: 37 (08 Aug 2021 15:49)  T(F): 98.6 (08 Aug 2021 15:49), Max: 98.6 (08 Aug 2021 15:49)  HR: 73 (08 Aug 2021 15:49) (59 - 80)  BP: 138/65 (08 Aug 2021 15:49) (114/54 - 227/83)  BP(mean): --  RR: 15 (08 Aug 2021 15:49) (15 - 20)  SpO2: 98% (08 Aug 2021 15:49) (98% - 100%)    PHYSICAL EXAM:  General appearance: Well developed, well nourished  Eyes: Pupils equal and reactive to light.  Neck: Trachea midline.   Lungs: Normal respiratory excursion. Lungs clear.  CV: Regular cardiac rhythm. No murmur. Carotid and pedal pulses intact.  Abdomen: Soft, non tender, no organomegaly or mass.  Skin: Warm and moist. No rash.   Neuro: A1x 2 x 3   Psych: no depressed,     LABS:                        13.6   12.23 )-----------( 167      ( 08 Aug 2021 07:49 )             40.2     08-08    136  |  99  |  26.3<H>  ----------------------------<  271<H>  3.8   |  22.0  |  0.82    Ca    8.7      08 Aug 2021 12:43  Phos  3.2     08-08  Mg     1.9     08-08    TPro  5.9<L>  /  Alb  3.4  /  TBili  0.6  /  DBili  x   /  AST  21  /  ALT  21  /  AlkPhos  64  08-08    Urinalysis Basic - ( 08 Aug 2021 02:48 )    Color: Pale Yellow / Appearance: Cloudy / S.010 / pH: x  Gluc: x / Ketone: Small  / Bili: Negative / Urobili: Negative mg/dL   Blood: x / Protein: 30 mg/dL / Nitrite: Negative   Leuk Esterase: Moderate / RBC: 3-5 /HPF / WBC 11-25   Sq Epi: x / Non Sq Epi: Occasional / Bacteria: Few    CAPILLARY BLOOD GLUCOSE  POCT Blood Glucose.: 379 mg/dL (08 Aug 2021 11:10)  POCT Blood Glucose.: 443 mg/dL (08 Aug 2021 07:59)  
INTERVAL HPI/OVERNIGHT EVENTS: follow up of diabetes    MEDICATIONS  (STANDING):  amLODIPine   Tablet 10 milliGRAM(s) Oral daily  aspirin enteric coated 81 milliGRAM(s) Oral daily  dextrose 40% Gel 15 Gram(s) Oral once  dextrose 5%. 1000 milliLiter(s) (50 mL/Hr) IV Continuous <Continuous>  dextrose 5%. 1000 milliLiter(s) (100 mL/Hr) IV Continuous <Continuous>  dextrose 50% Injectable 25 Gram(s) IV Push once  dextrose 50% Injectable 12.5 Gram(s) IV Push once  dextrose 50% Injectable 25 Gram(s) IV Push once  glucagon  Injectable 1 milliGRAM(s) IntraMuscular once  heparin   Injectable 5000 Unit(s) SubCutaneous every 8 hours  insulin glargine Injectable (LANTUS) 20 Unit(s) SubCutaneous at bedtime  insulin lispro (ADMELOG) corrective regimen sliding scale   SubCutaneous three times a day before meals  insulin lispro Injectable (ADMELOG) 6 Unit(s) SubCutaneous three times a day before meals  insulin regular  human recombinant. 10 Unit(s) SubCutaneous once  lactated ringers. 1000 milliLiter(s) (100 mL/Hr) IV Continuous <Continuous>  levothyroxine 125 MICROGram(s) Oral daily  losartan 100 milliGRAM(s) Oral daily  tamsulosin 0.4 milliGRAM(s) Oral at bedtime    MEDICATIONS  (PRN):  acetaminophen   Tablet .. 650 milliGRAM(s) Oral every 6 hours PRN Temp greater or equal to 38.5C (101.3F), Mild Pain (1 - 3)  aluminum hydroxide/magnesium hydroxide/simethicone Suspension 30 milliLiter(s) Oral every 4 hours PRN Dyspepsia  hydrALAZINE Injectable 10 milliGRAM(s) IV Push every 8 hours PRN SBP >160  melatonin 3 milliGRAM(s) Oral at bedtime PRN Insomnia  ondansetron Injectable 4 milliGRAM(s) IV Push every 8 hours PRN Nausea and/or Vomiting      Allergies    No Known Allergies    Intolerances    Vital Signs Last 24 Hrs  T(C): 36.6 (12 Aug 2021 10:28), Max: 37 (12 Aug 2021 05:17)  T(F): 97.8 (12 Aug 2021 10:28), Max: 98.6 (12 Aug 2021 05:17)  HR: 81 (12 Aug 2021 09:04) (66 - 81)  BP: 101/54 (12 Aug 2021 09:04) (94/53 - 137/97)  BP(mean): --  RR: 19 (12 Aug 2021 09:04) (17 - 19)  SpO2: 97% (12 Aug 2021 09:04) (95% - 98%)      LABS:                        10.8   6.53  )-----------( 159      ( 12 Aug 2021 06:47 )             30.9     08-12    136  |  99  |  22.0<H>  ----------------------------<  194<H>  4.2   |  29.0  |  0.65    Ca    8.4<L>      12 Aug 2021 06:47  Phos  3.0     08-11  Mg     2.0     08-11            POCT Blood Glucose.: 90 mg/dL (08-12-21 @ 11:48)  POCT Blood Glucose.: 72 mg/dL (08-12-21 @ 11:01)  POCT Blood Glucose.: 338 mg/dL (08-12-21 @ 08:19)  POCT Blood Glucose.: 326 mg/dL (08-11-21 @ 21:38)  POCT Blood Glucose.: 234 mg/dL (08-11-21 @ 17:08)  POCT Blood Glucose.: 211 mg/dL (08-11-21 @ 12:02)  POCT Blood Glucose.: 300 mg/dL (08-11-21 @ 08:11)  POCT Blood Glucose.: 142 mg/dL (08-10-21 @ 22:39)  POCT Blood Glucose.: 81 mg/dL (08-10-21 @ 21:22)  POCT Blood Glucose.: 87 mg/dL (08-10-21 @ 21:20)  POCT Blood Glucose.: 170 mg/dL (08-10-21 @ 16:57)  POCT Blood Glucose.: 263 mg/dL (08-10-21 @ 12:06)  POCT Blood Glucose.: 182 mg/dL (08-10-21 @ 07:53)  POCT Blood Glucose.: 186 mg/dL (08-09-21 @ 21:50)  POCT Blood Glucose.: 178 mg/dL (08-09-21 @ 21:48)  POCT Blood Glucose.: 313 mg/dL (08-09-21 @ 21:44)  POCT Blood Glucose.: 181 mg/dL (08-09-21 @ 17:15)      RADIOLOGY & ADDITIONAL TESTS:  
INTERVAL HPI/OVERNIGHT EVENTS: follow up ofdiabetes    MEDICATIONS  (STANDING):  amLODIPine   Tablet 10 milliGRAM(s) Oral daily  aspirin enteric coated 81 milliGRAM(s) Oral daily  cefTRIAXone   IVPB      cefTRIAXone   IVPB 1000 milliGRAM(s) IV Intermittent every 24 hours  dextrose 40% Gel 15 Gram(s) Oral once  dextrose 5%. 1000 milliLiter(s) (50 mL/Hr) IV Continuous <Continuous>  dextrose 5%. 1000 milliLiter(s) (100 mL/Hr) IV Continuous <Continuous>  dextrose 50% Injectable 25 Gram(s) IV Push once  dextrose 50% Injectable 12.5 Gram(s) IV Push once  dextrose 50% Injectable 25 Gram(s) IV Push once  glucagon  Injectable 1 milliGRAM(s) IntraMuscular once  heparin   Injectable 5000 Unit(s) SubCutaneous every 8 hours  insulin glargine Injectable (LANTUS) 15 Unit(s) SubCutaneous at bedtime  insulin lispro (ADMELOG) corrective regimen sliding scale   SubCutaneous three times a day before meals  insulin lispro Injectable (ADMELOG) 4 Unit(s) SubCutaneous three times a day before meals  insulin regular  human recombinant. 10 Unit(s) SubCutaneous once  lactated ringers. 1000 milliLiter(s) (100 mL/Hr) IV Continuous <Continuous>  levothyroxine 125 MICROGram(s) Oral daily  losartan 100 milliGRAM(s) Oral daily    MEDICATIONS  (PRN):  acetaminophen   Tablet .. 650 milliGRAM(s) Oral every 6 hours PRN Temp greater or equal to 38.5C (101.3F), Mild Pain (1 - 3)  aluminum hydroxide/magnesium hydroxide/simethicone Suspension 30 milliLiter(s) Oral every 4 hours PRN Dyspepsia  hydrALAZINE Injectable 10 milliGRAM(s) IV Push every 8 hours PRN SBP >160  melatonin 3 milliGRAM(s) Oral at bedtime PRN Insomnia  ondansetron Injectable 4 milliGRAM(s) IV Push every 8 hours PRN Nausea and/or Vomiting      Allergies    No Known Allergies    Intolerances        Vital Signs Last 24 Hrs  T(C): 36.6 (10 Aug 2021 10:50), Max: 36.6 (10 Aug 2021 10:50)  T(F): 97.8 (10 Aug 2021 10:50), Max: 97.8 (10 Aug 2021 10:50)  HR: 65 (10 Aug 2021 10:50) (51 - 69)  BP: 128/63 (10 Aug 2021 10:50) (120/51 - 165/64)  BP(mean): --  RR: 18 (10 Aug 2021 10:50) (16 - 18)  SpO2: 98% (10 Aug 2021 05:51) (97% - 98%)      LABS:                        10.6   6.84  )-----------( 147      ( 10 Aug 2021 06:32 )             31.0     08-10    136  |  102  |  23.0<H>  ----------------------------<  213<H>  4.2   |  27.0  |  0.60    Ca    7.7<L>      10 Aug 2021 06:32  Phos  2.5     08-10  Mg     2.0     08-10            POCT Blood Glucose.: 263 mg/dL (08-10-21 @ 12:06)  POCT Blood Glucose.: 182 mg/dL (08-10-21 @ 07:53)  POCT Blood Glucose.: 186 mg/dL (08-09-21 @ 21:50)  POCT Blood Glucose.: 178 mg/dL (08-09-21 @ 21:48)  POCT Blood Glucose.: 313 mg/dL (08-09-21 @ 21:44)  POCT Blood Glucose.: 181 mg/dL (08-09-21 @ 17:15)  POCT Blood Glucose.: 225 mg/dL (08-09-21 @ 12:06)  POCT Blood Glucose.: 236 mg/dL (08-09-21 @ 08:23)  POCT Blood Glucose.: 262 mg/dL (08-08-21 @ 21:43)  POCT Blood Glucose.: 460 mg/dL (08-08-21 @ 16:13)  POCT Blood Glucose.: 379 mg/dL (08-08-21 @ 11:10)  POCT Blood Glucose.: 443 mg/dL (08-08-21 @ 07:59)  POCT Blood Glucose.: 361 mg/dL (08-07-21 @ 16:02)      
  Chief Complaint:  AMS    SUBJECTIVE / OVERNIGHT EVENTS:  No acute events reported overnight.  Pt poor historian but mentation improving.  Pt offers no acute complaints at this time.     Patient denies chest pain, SOB, abd pain, N/V, fever, chills, dysuria or any other complaints. All remainder ROS negative.       I&O's Summary    07 Aug 2021 07:01  -  08 Aug 2021 07:00  --------------------------------------------------------  IN: 0 mL / OUT: 500 mL / NET: -500 mL          PHYSICAL EXAM:  Vital Signs Last 24 Hrs  T(C): 36.5 (08 Aug 2021 08:32), Max: 36.9 (07 Aug 2021 23:23)  T(F): 97.7 (08 Aug 2021 08:32), Max: 98.4 (07 Aug 2021 23:23)  HR: 77 (08 Aug 2021 08:32) (55 - 80)  BP: 114/54 (08 Aug 2021 08:32) (114/54 - 227/83)  BP(mean): --  RR: 19 (08 Aug 2021 08:32) (18 - 20)  SpO2: 98% (08 Aug 2021 08:32) (97% - 100%)      GENERAL: elderly female, examined bedside, laying comfortably in bed in NAD  HEENT: NC/AT, moist oral mucosa, clear conjunctiva, sclera nonicteric  RESPIRATORY: Normal respiratory effort; CTA b/l, no wheezing, rhonchi, rales  CARDIOVASCULAR: RRR, normal S1 and S2, no murmur/rub/gallop  ABDOMEN: soft, NT/ND, normoactive bowel sounds, no rebound/guarding  EXTREMITIES: No cynaosis, no clubbing, no lower extremity edema; Peripheral pulses are 2+ bilaterally  PSYCH: affect appropriate and cooperative  NEUROLOGY: A+O to self and place, no focal neurologic deficits appreciated   SKIN: No rashes or no palpable lesions        LABS:                        13.6   12.23 )-----------( 167      ( 08 Aug 2021 07:49 )             40.2     08-08    137  |  97<L>  |  22.0<H>  ----------------------------<  463<H>  4.5   |  22.0  |  0.68    Ca    8.4<L>      08 Aug 2021 07:49  Phos  3.2     08-08  Mg     1.9     08-08    TPro  5.9<L>  /  Alb  3.4  /  TBili  0.6  /  DBili  x   /  AST  21  /  ALT  21  /  AlkPhos  64  08-08      CARDIAC MARKERS ( 07 Aug 2021 12:09 )  x     / <0.01 ng/mL / 64 U/L / x     / x          Urinalysis Basic - ( 08 Aug 2021 02:48 )    Color: Pale Yellow / Appearance: Cloudy / S.010 / pH: x  Gluc: x / Ketone: Small  / Bili: Negative / Urobili: Negative mg/dL   Blood: x / Protein: 30 mg/dL / Nitrite: Negative   Leuk Esterase: Moderate / RBC: 3-5 /HPF / WBC 11-25   Sq Epi: x / Non Sq Epi: Occasional / Bacteria: Few        Culture - Urine (collected 07 Aug 2021 03:11)  Source: Clean Catch Clean Catch (Midstream)  Final Report (07 Aug 2021 22:00):    <10,000 CFU/mL Normal Urogenital Nohemi      CAPILLARY BLOOD GLUCOSE      POCT Blood Glucose.: 443 mg/dL (08 Aug 2021 07:59)  POCT Blood Glucose.: 361 mg/dL (07 Aug 2021 16:02)  POCT Blood Glucose.: 129 mg/dL (07 Aug 2021 11:10)        RADIOLOGY & ADDITIONAL TESTS:          MEDICATIONS  (STANDING):  amLODIPine   Tablet 10 milliGRAM(s) Oral daily  aspirin enteric coated 81 milliGRAM(s) Oral daily  cefTRIAXone   IVPB      cefTRIAXone   IVPB 1000 milliGRAM(s) IV Intermittent once  dextrose 40% Gel 15 Gram(s) Oral once  dextrose 5%. 1000 milliLiter(s) (50 mL/Hr) IV Continuous <Continuous>  dextrose 5%. 1000 milliLiter(s) (100 mL/Hr) IV Continuous <Continuous>  dextrose 50% Injectable 25 Gram(s) IV Push once  dextrose 50% Injectable 12.5 Gram(s) IV Push once  dextrose 50% Injectable 25 Gram(s) IV Push once  glucagon  Injectable 1 milliGRAM(s) IntraMuscular once  heparin   Injectable 5000 Unit(s) SubCutaneous every 8 hours  insulin glargine Injectable (LANTUS) 10 Unit(s) SubCutaneous at bedtime  insulin lispro (ADMELOG) corrective regimen sliding scale   SubCutaneous three times a day before meals  lactated ringers. 1000 milliLiter(s) (100 mL/Hr) IV Continuous <Continuous>  levothyroxine 125 MICROGram(s) Oral daily  losartan 100 milliGRAM(s) Oral daily    MEDICATIONS  (PRN):  acetaminophen   Tablet .. 650 milliGRAM(s) Oral every 6 hours PRN Temp greater or equal to 38.5C (101.3F), Mild Pain (1 - 3)  aluminum hydroxide/magnesium hydroxide/simethicone Suspension 30 milliLiter(s) Oral every 4 hours PRN Dyspepsia  hydrALAZINE Injectable 10 milliGRAM(s) IV Push every 8 hours PRN SBP >160  melatonin 3 milliGRAM(s) Oral at bedtime PRN Insomnia  ondansetron Injectable 4 milliGRAM(s) IV Push every 8 hours PRN Nausea and/or Vomiting

## 2021-08-12 NOTE — DISCHARGE NOTE NURSING/CASE MANAGEMENT/SOCIAL WORK - PATIENT PORTAL LINK FT
You can access the FollowMyHealth Patient Portal offered by [facility] by registering at the following website: [email or web address]. By joining [facility]’s FollowMyHealth portal, you will also be able to view your health information using other applications (apps) compatible with our system.

## 2021-08-12 NOTE — PROGRESS NOTE ADULT - ASSESSMENT
Outpatient MRI just revealed cerebral atrophy and microvascular changes  Borderline hypoglycemia. Will reduce prandial insulin by one unit

## 2021-08-12 NOTE — PROGRESS NOTE ADULT - ASSESSMENT
72 y/o F with PMH of IDDM (recently on Insulin pump now on Basal/Bolus regimen), HTN, HLD, Grave's Disease, Hypothyroidism, Thyroid Eye Disease on [place], BPPV, VitD Deficiency, B12 Deficiency, TIA, MDD, ?dementia w/ behavioral disturbances (per outpt records) presents to [facility] ER for evaluation of AMS.        Acute metabolic encephalopathy likely 2/2 UTI vs hypoglycemia w/ hx of progressive behavioral disturbances/dementia  - Repeat UA w/ pyuria w/ leukocytosis  - Leukocytosis now resolved  - Completed course of Rocephin   - Ucx (-)  - Hypoglecemia resolved   - CTH negative acute changes  - s/p gentle hydration   - TSH, vitamin and ammonia levels WNL  - Outpt documentation reviewed and pt noted to have progressive behavioral disturbances/dementia and following w/ neurology       IDDM w/ impending DKA, resolved   - A1c 9.4  - Hypoglycemic Episodes on admission resolved  - c/w basal/bolus regimen and titrating to maintain BG <180   - AG and lactic acid WNL   - c/w ISS and hypoglycemic protocol  - Endo following       Normocytic anemia / Thrombocytopenia  - Improving   - Likely dilutional given hydration during hospital course  - No signs of active bleeding and hemodynamically stable  - Will monitor CBC and transfuse if Hb <7-8      HTN Urgency  - BP improved since admission  - c/w home medications and titrate as needed to optimize BP      Hx of CAD / TIA   - c/w ASA and losartan  - Continue to hold HCTZ and resume on discharge      Hypothyroidism, Thyroid Eye Disease  - Tepezza infusions  - c/w Levothyroxine   - TFTs WNL      HLD  - Unable to tolerate statins  - Praluent q2 weeks      VTE ppx: heparin sq    Dispo: PT eval noted.  Pt stable for d/c to [facility].     Have reached out to son Stanley ([phone]) mx times but no answer.  Left mx VMs.  Will reach out again today.

## 2021-08-12 NOTE — PROGRESS NOTE ADULT - REASON FOR ADMISSION
AMS/Confusion

## 2021-08-12 NOTE — PROGRESS NOTE ADULT - PROVIDER SPECIALTY LIST ADULT
Hospitalist
Hospitalist
Endocrinology
Endocrinology
Hospitalist
Hospitalist
Endocrinology
Endocrinology
Hospitalist

## 2021-08-13 ENCOUNTER — NON-APPOINTMENT (OUTPATIENT)
Age: 71
End: 2021-08-13

## 2021-08-30 RX ORDER — MIRABEGRON 50 MG/1
1 TABLET, EXTENDED RELEASE ORAL
Qty: 0 | Refills: 0 | DISCHARGE

## 2021-08-30 RX ORDER — ASPIRIN/CALCIUM CARB/MAGNESIUM 324 MG
1 TABLET ORAL
Qty: 0 | Refills: 0 | DISCHARGE

## 2021-08-30 RX ORDER — ALIROCUMAB 75 MG/ML
0 INJECTION, SOLUTION SUBCUTANEOUS
Qty: 0 | Refills: 0 | DISCHARGE

## 2021-08-30 RX ORDER — LOSARTAN POTASSIUM 100 MG/1
1 TABLET, FILM COATED ORAL
Qty: 0 | Refills: 0 | DISCHARGE

## 2021-08-30 RX ORDER — TEPROTUMUMAB 500 MG/1
0 INJECTION, POWDER, LYOPHILIZED, FOR SOLUTION INTRAVENOUS
Qty: 0 | Refills: 0 | DISCHARGE

## 2021-08-30 RX ORDER — LEVOTHYROXINE SODIUM 125 MCG
1 TABLET ORAL
Qty: 0 | Refills: 0 | DISCHARGE

## 2021-08-30 RX ORDER — INSULIN GLARGINE 100 [IU]/ML
26 INJECTION, SOLUTION SUBCUTANEOUS
Qty: 0 | Refills: 0 | DISCHARGE

## 2021-08-30 RX ORDER — INSULIN LISPRO 100/ML
0 VIAL (ML) SUBCUTANEOUS
Qty: 0 | Refills: 0 | DISCHARGE

## 2021-09-13 ENCOUNTER — NON-APPOINTMENT (OUTPATIENT)
Age: 71
End: 2021-09-13

## 2021-09-19 NOTE — DISCHARGE NOTE NURSING/CASE MANAGEMENT/SOCIAL WORK - NSTRANSFERHEARINGAID_GEN_A_NUR
Subjective   9/15/21: tolerated plasmapheresis well. Still some shortness of breath.   9/16/21: feeling ok today. Awaiting plasmapheresis  9/17/21: some shortness of breath persists. Tolerated plasmapheresis well 9/16.   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     I/O's    Intake/Output Summary (Last 24 hours) at 9/19/2021 1110  Last data filed at 9/19/2021 0900  Gross per 24 hour   Intake 494 ml   Output 1625 ml   Net -1131 ml       Last Recorded Vitals  Blood pressure 126/81, pulse 73, temperature 97.2 °F (36.2 °C), temperature source Axillary, resp. rate 19, height 6' 4\" (1.93 m), weight 112.2 kg (247 lb 5.7 oz), SpO2 96 %.  Body mass index is 30.11 kg/m².    Physical Exam  Constitutional:       Appearance: Normal appearance.   HENT:      Head: Normocephalic.      Mouth/Throat:      Mouth: Mucous membranes are moist.   Cardiovascular:      Rate and Rhythm: Normal rate.      Pulses: Normal pulses.   Pulmonary:      Breath sounds: Normal breath sounds.   Abdominal:      General: Bowel sounds are normal.   Musculoskeletal:         General: No swelling.   Skin:     Coloration: Skin is not jaundiced.   Psychiatric:         Mood and Affect: Mood normal.         Behavior: Behavior normal.         Thought Content: Thought content normal.         Judgment: Judgment normal.          Medications  Current Facility-Administered Medications   Medication   • sodium citrate anticoagulant 4 % flush 3 mL   • albumin human 5 % injection 12.5 g   • calcium gluconate 4,000 mg in sodium chloride 0.9 % 250 mL IVPB   • sodium citrate anticoagulant 4 % flush 3 mL   • diphenhydrAMINE (BENADRYL) injection  25 mg   • hydroCORTisone (Solu-CORTEF) PF injection 100 mg   • albumin human 5 % injection 3,500 mL   • ipratropium-albuterol (DUONEB) 0.5-2.5 (3) MG/3ML nebulizer solution 3 mL   • calcium carbonate (TUMS) chewable tablet 1,000 mg   • fluticasone-vilanterol (BREO ELLIPTA) 100-25 MCG/INH inhaler 1 puff   • folic acid (FOLATE) tablet 1 mg   • mycophenolate (CELLCEPT) tablet 500 mg   • pantoprazole (PROTONIX) EC tablet 40 mg   • predniSONE (DELTASONE) tablet 10 mg   • cholecalciferol (VITAMIN D) tablet 50 mcg   • azelastine (ASTELIN) 0.1 % nasal spray 2 spray   • HYDROcodone-acetaminophen (NORCO)  MG per tablet 1 tablet   • acetaminophen (TYLENOL) tablet 650 mg   • ondansetron (ZOFRAN) injection 4 mg   • celecoxib (CeleBREX) capsule 200 mg   • gabapentin (NEURONTIN) capsule 300 mg   • hydroxychloroquine (PLAQUENIL) tablet 200 mg   • methotrexate (RHEUMATREX) tablet 20 mg   • levothyroxine (SYNTHROID, LEVOTHROID) tablet 100 mcg    And   • levothyroxine (SYNTHROID, LEVOTHROID) tablet 75 mcg   • atenolol (TENORMIN) tablet 25 mg   • losartan (COZAAR) tablet 100 mg   • ALPRAZolam (XANAX) tablet 1 mg      Medications Prior to Admission   Medication Sig Dispense Refill   • Azelastine HCl 137 MCG/SPRAY Solution Spray 2 sprays in each nostril 2 times daily as needed.      • mycophenolate (CELLCEPT) 500 MG tablet Take 500 mg by mouth daily.      • omeprazole (PrilOSEC) 20 MG capsule Take 20 mg by mouth daily.      • predniSONE (DELTASONE) 10 MG tablet Take 10 mg by mouth 2 times daily.      • Cholecalciferol (Vitamin D) 50 mcg (2,000 units) tablet Take 50 mcg by mouth daily.     • levothyroxine 25 MCG tablet Take 25 mcg by mouth daily. With 150 mcg for total dose 175 mcg daily     • gabapentin (NEURONTIN) 300 MG capsule Take 300 mg by mouth 2 times daily.     • fluticasone-salmeterol (ADVAIR, WIXELA) 100-50 MCG/DOSE inhaler Inhale 1 puff into the lungs two times daily.     • albuterol-ipratropium (Combivent Respimat)  100-20 MCG/ACT inhaler Inhale 1 puff into the lungs every 6 hours as needed for Shortness of Breath. Do not start before November 6, 2020.     • methotrexate (RHEUMATREX) 2.5 MG tablet Take 20 mg by mouth 1 day a week. On Tuesday     • folic acid (FOLATE) 1 MG tablet Take 1 mg by mouth 6 days a week. Every day EXCEPT Tuesday     • hydroxychloroquine (PLAQUENIL) 200 MG tablet Take 200 mg by mouth daily.      • omega-3 acid ethyl esters (LOVAZA) 1 g capsule Take 2 g by mouth 2 times daily.     • celecoxib (CeleBREX) 200 MG capsule Take 200 mg by mouth 2 times daily.     • calcium carbonate (TUMS) 500 MG chewable tablet Chew 2 tablets by mouth every 8 hours as needed for Heartburn.     • HYDROcodone-acetaminophen (NORCO)  MG per tablet Take 1 tablet by mouth every 6 hours as needed for Pain (1-2 tabs every 4-6 hours as needed for pain). 60 tablet 0   • atenolol (TENORMIN) 50 MG tablet Take 50 mg by mouth daily.     • amLODIPine (NORVASC) 10 MG tablet Take 10 mg by mouth daily.     • lidocaine (LIDODERM) 5 % patch Place 1 patch onto the skin daily as needed for Pain. Remove patch 12 hours after applying      • levothyroxine (SYNTHROID, LEVOTHROID) 150 MCG tablet Take 150 mcg by mouth daily. With 25 mcg for total dose 175 mcg daily         Labs     Recent Labs   Lab 09/19/21  0413 09/18/21  0426 09/17/21  0350   WBC 9.5 8.3 8.3   RBC 4.56 4.67 5.04   HGB 15.1 14.3 15.5   HCT 43.7 44.1 47.3    212 228       Recent Labs   Lab 09/19/21  0413 09/18/21  0426 09/17/21  0350   SODIUM 141 141 140   CHLORIDE 111* 109* 111*   CO2 29 27 26   BUN 17 21* 22*   CREATININE 0.43* 0.44* 0.38*   CALCIUM 8.6 8.6 8.3*   ALBUMIN 3.7 3.5* 3.7   BILIRUBIN 0.7 0.6 0.9   ALKPT 14* 17* 15*   GPT 21 25 18   AST 17 14 11   GLUCOSE 100* 92 105*     Potassium (mmol/L)   Date Value   09/19/2021 4.4          • albumin human 5 %  3,500 mL Intravenous Q48H   • ipratropium-albuterol  3 mL Nebulization Q4H Resp   • fluticasone-vilanterol  1  puff Inhalation Daily Resp   • folic acid  1 mg Oral Once per day on Sun Mon Wed Thu Fri Sat   • mycophenolate  500 mg Oral Daily Tx   • pantoprazole  40 mg Oral QAM AC   • predniSONE  10 mg Oral BID WC   • cholecalciferol  50 mcg Oral Daily   • azelastine  2 spray Each Nare 2 times per day   • celecoxib  200 mg Oral BID   • gabapentin  300 mg Oral BID   • hydroxychloroquine  200 mg Oral Daily   • methotrexate  20 mg Oral Once per day on Tue   • levothyroxine  100 mcg Oral QAM AC    And   • levothyroxine  75 mcg Oral QAM AC   • atenolol  25 mg Oral Daily   • losartan  100 mg Oral Daily       IR TEMPORARY DIALYSIS CATHETER INSERTION AGE 5 OR OLDER    Result Date: 9/14/2021  EXAM: IR TEMPORARY DIALYSIS CATHETER INSERTION AGE 5 OR OLDER     CLINICAL IMPRESSION: 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 INTERVENTIONALIST: Dr. Chin Doyle CONSENT: After the risks, benefits, and alternatives to the procedure were explained to the patient/patient's decision maker formal informed written consent was documented in the chart.  The risk/benefit discussion was understood and all questions were answered.  It is understood that this procedure may not provide all the information that their physician needs, and thus the possibility for further testing may be needed.  Specific risks including but not limited to bleeding, infection and damage to adjacent organs were discussed and accepted Patient's identification, correct procedure and position were verified. The appropriate site was verified and marked as appropriate. Immediately prior to the start of the procedure, a \"time-out\" was taken to reconfirm this information with acknowledgement of the staff present in the exam room. SEDATION: None FLUOROSCOPIC TIME: 0.7 minutes CATHETER: 13 Uz[place] x 20 cm DuoGlide temporary hemodialysis catheter. COMPLICATIONS: No acute  complications PROCEDURE/FINDINGS: All elements of maximal sterile barrier technique were followed including cap, mask, sterile gown, large sterile sheet, hand hygiene, and 2% chlorhexidine for cutaneous antisepsis (or acceptable alternative antiseptic such as an iodophor, tincture or iodine, or 70% alcohol). All elements of Sterile Ultrasound Technique have been met.  The right neck was prepped and draped in usual sterile fashion. Sonographic evaluation of the right internal jugular vein demonstrated patent and compressible vein. An image was taken for documentation and sent to PACS. Using ultrasound guidance right internal jugular vein was punctured with micropuncture needle, after infiltration of the skin and deep tissues with local anesthetic.  Ultrasound image show the tip of the needle is in a patent right internal jugular vein. Using this access a 13 [place] x 20 cm dual lumen DuoGlide hemodialysis catheter was inserted under fluoroscopy after serial dilation.  The catheter was secured at the skin exit site 2-0 silk suture.  There is free aspiration of blood from all ports of the catheter.  Excellent function with 10 cc syringe. The ports of the catheter were locked with heparin.  A sterile dressing was then applied. Fluoroscopic image shows the tip of the catheter is in the right atrium. The patient tolerated the procedure well with no immediate complications. This procedure was performed using ultrasound and fluoroscopy. IMPRESSION: Successful nontunneled (temporary) hemodialysis catheter placement as discussed above. Electronically Signed by: HUMZA FRANCISCO M.D. 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 Nephrology was  Consulted for empiric  plasmapheresis management.      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 Tolerated well  - Tolerated plasmapheresis 9/18 again  - plasmapheresis # 4/5 t9/18  - will monitor     Kidney function  - in acceptable range  - monitor on celebrex and losartan     Electrolytes  - stable       HTN  - continue current meds and monitor     Mixed connective tissues disease  - on methotrexate, hydroxychloroquine and prednisone  - management as per Rheumatology     d/w pt and  RN     Thank you. Will follow with you    Smoking Cessation  Counseling given: Not Answered  Comment: quit 2001                 with pt/bilateral

## 2021-09-27 ENCOUNTER — RX RENEWAL (OUTPATIENT)
Age: 71
End: 2021-09-27

## 2021-09-28 ENCOUNTER — APPOINTMENT (OUTPATIENT)
Dept: ENDOCRINOLOGY | Facility: CLINIC | Age: 71
End: 2021-09-28

## 2021-09-29 LAB — HBA1C MFR BLD HPLC: 8

## 2021-10-19 ENCOUNTER — NON-APPOINTMENT (OUTPATIENT)
Age: 71
End: 2021-10-19

## 2021-10-25 ENCOUNTER — APPOINTMENT (OUTPATIENT)
Dept: INTERNAL MEDICINE | Facility: CLINIC | Age: 71
End: 2021-10-25
Payer: MEDICARE

## 2021-10-25 VITALS
OXYGEN SATURATION: 97 % | SYSTOLIC BLOOD PRESSURE: 122 MMHG | DIASTOLIC BLOOD PRESSURE: 68 MMHG | TEMPERATURE: 97.7 F | WEIGHT: 125 LBS | HEIGHT: 61 IN | HEART RATE: 77 BPM | RESPIRATION RATE: 14 BRPM | BODY MASS INDEX: 23.6 KG/M2

## 2021-10-25 DIAGNOSIS — D64.9 ANEMIA, UNSPECIFIED: ICD-10-CM

## 2021-10-25 DIAGNOSIS — R26.81 UNSTEADINESS ON FEET: ICD-10-CM

## 2021-10-25 PROCEDURE — 99214 OFFICE O/P EST MOD 30 MIN: CPT

## 2021-10-25 NOTE — HEALTH RISK ASSESSMENT
[Not at All (0)] : 9.) Thoughts that you would be off dead or of hurting yourself in some way? Not at all [PHQ-9 Negative - No further assessment needed] : PHQ-9 Negative - No further assessment needed [[id number]] : 0

## 2021-10-25 NOTE — REVIEW OF SYSTEMS
[Unsteady Walking] : ataxia [Fever] : no fever [Chills] : no chills [Night Sweats] : no night sweats [Chest Pain] : no chest pain [Palpitations] : no palpitations [Lower Ext Edema] : no lower extremity edema [Shortness Of Breath] : no shortness of breath [Wheezing] : no wheezing [Cough] : no cough [Dyspnea on Exertion] : no dyspnea on exertion [Abdominal Pain] : no abdominal pain [Nausea] : no nausea [Constipation] : no constipation [Diarrhea] : diarrhea [Vomiting] : no vomiting [Dysuria] : no dysuria

## 2021-10-25 NOTE — HISTORY OF PRESENT ILLNESS
[de-identified] : 71F presents for follow-up after admission to hospital for UTI. Does not have discharge summary. Reports was discharged to rehab for unsteady gait. Since discharge, gait has improved. Denies fever, chills, dysuria, flank pain. \par \par Of note, has had fluctuating blood sugars. Aid has been giving lantus 10U daily and novolog 4U at meals. Has had episodes of hypoglycemia in the morning and night. \par

## 2021-10-25 NOTE — PHYSICAL EXAM
[No Acute Distress] : no acute distress [Normal Sclera/Conjunctiva] : normal sclera/conjunctiva [PERRL] : pupils equal round and reactive to light [EOMI] : extraocular movements intact [No Respiratory Distress] : no respiratory distress  [No Accessory Muscle Use] : no accessory muscle use [Clear to Auscultation] : lungs were clear to auscultation bilaterally [Normal Rate] : normal rate  [Regular Rhythm] : with a regular rhythm [Normal S1, S2] : normal S1 and S2 [No Edema] : there was no peripheral edema [Soft] : abdomen soft [Non Tender] : non-tender [Non-distended] : non-distended [Normal Bowel Sounds] : normal bowel sounds [de-identified] : unsteady gait

## 2021-10-25 NOTE — ASSESSMENT
[FreeTextEntry1] : UTI: Resolved. Check labs. \par Unsteady gait: Refer to PT. \par Depression: Resolved. \par \par

## 2021-10-27 LAB
ANION GAP SERPL CALC-SCNC: 12 MMOL/L
APPEARANCE: CLEAR
BASOPHILS # BLD AUTO: 0.05 K/UL
BASOPHILS NFR BLD AUTO: 0.7 %
BILIRUBIN URINE: NEGATIVE
BLOOD URINE: NEGATIVE
BUN SERPL-MCNC: 20 MG/DL
CALCIUM SERPL-MCNC: 9.8 MG/DL
CHLORIDE SERPL-SCNC: 99 MMOL/L
CO2 SERPL-SCNC: 28 MMOL/L
COLOR: NORMAL
CREAT SERPL-MCNC: 0.64 MG/DL
CREAT SPEC-SCNC: 67 MG/DL
EOSINOPHIL # BLD AUTO: 0.13 K/UL
EOSINOPHIL NFR BLD AUTO: 1.7 %
FERRITIN SERPL-MCNC: 134 NG/ML
FOLATE SERPL-MCNC: >20 NG/ML
GLUCOSE QUALITATIVE U: NEGATIVE
GLUCOSE SERPL-MCNC: 70 MG/DL
HCT VFR BLD CALC: 35.5 %
HGB BLD-MCNC: 12 G/DL
IMM GRANULOCYTES NFR BLD AUTO: 0.3 %
IRON SATN MFR SERPL: 20 %
IRON SERPL-MCNC: 67 UG/DL
KETONES URINE: NEGATIVE
LEUKOCYTE ESTERASE URINE: NEGATIVE
LYMPHOCYTES # BLD AUTO: 1.66 K/UL
LYMPHOCYTES NFR BLD AUTO: 22.3 %
MAN DIFF?: NORMAL
MCHC RBC-ENTMCNC: 33.2 PG
MCHC RBC-ENTMCNC: 33.8 GM/DL
MCV RBC AUTO: 98.3 FL
MICROALBUMIN 24H UR DL<=1MG/L-MCNC: 1.4 MG/DL
MICROALBUMIN/CREAT 24H UR-RTO: 21 MG/G
MONOCYTES # BLD AUTO: 0.91 K/UL
MONOCYTES NFR BLD AUTO: 12.2 %
NEUTROPHILS # BLD AUTO: 4.69 K/UL
NEUTROPHILS NFR BLD AUTO: 62.8 %
NITRITE URINE: NEGATIVE
PH URINE: 7
PLATELET # BLD AUTO: 255 K/UL
POTASSIUM SERPL-SCNC: 4.2 MMOL/L
PROTEIN URINE: NORMAL
RBC # BLD: 3.61 M/UL
RBC # FLD: 13.3 %
SODIUM SERPL-SCNC: 139 MMOL/L
SPECIFIC GRAVITY URINE: 1.02
TIBC SERPL-MCNC: 327 UG/DL
TSH SERPL-ACNC: 0.1 UIU/ML
UIBC SERPL-MCNC: 260 UG/DL
UROBILINOGEN URINE: NORMAL
VIT B12 SERPL-MCNC: 862 PG/ML
WBC # FLD AUTO: 7.46 K/UL

## 2021-11-03 ENCOUNTER — APPOINTMENT (OUTPATIENT)
Dept: ENDOCRINOLOGY | Facility: CLINIC | Age: 71
End: 2021-11-03
Payer: MEDICARE

## 2021-11-03 VITALS — BODY MASS INDEX: 24.35 KG/M2 | HEIGHT: 61 IN | WEIGHT: 129 LBS

## 2021-11-03 VITALS — OXYGEN SATURATION: 99 % | HEART RATE: 72 BPM

## 2021-11-03 LAB — GLUCOSE BLDC GLUCOMTR-MCNC: 221

## 2021-11-03 PROCEDURE — 99214 OFFICE O/P EST MOD 30 MIN: CPT | Mod: 25

## 2021-11-03 PROCEDURE — 95251 CONT GLUC MNTR ANALYSIS I&R: CPT

## 2021-11-03 PROCEDURE — 82962 GLUCOSE BLOOD TEST: CPT

## 2021-11-03 NOTE — HISTORY OF PRESENT ILLNESS
[FreeTextEntry1] : Pt fell in the summer time  and was in a rehab facility to help with PT and had UTI. 8/12/21-10/19/21. She has a 24/7 live in aid. \par \par Quality: T1DM\par Severity: moderate \par Duration: 48 years\par Onset: leg cramps \par Modifying Factors: insulin\par \par SMBG\par wears DEXCOM\par Average Glucose: 185\par Target Range: 48%\par Very High: 16%\par High: 34%\par Low: 1%\par Very Low: <1% \par \par HgbA1C: 8.0% in 9/2021\par \par Current Regimen:\par Lantus 4 units in AM and 4 units in PM (afriad of sugars going low) \par Humalog- 6 if if carb heavy meal or 4-5 units for less carbs in meals \par \par Weight: stable \par Diet:watching sugar and carbs, reads every label \par Exercise: physical therapy, walked twice and goes to mall to walk \par Smoking:  none\par \par Hypothyroidism\par -LT4 125 mcg, 1/2 tablet Monday - Saturday, Skip Sunday \par -Grave's opthalmopathy ++ TSI, had 1 injection tepezza\par -TSH 0.1

## 2021-11-03 NOTE — PHYSICAL EXAM
[Alert] : alert [Normal Sclera/Conjunctiva] : normal sclera/conjunctiva [Normal Hearing] : hearing was normal [No Neck Mass] : no neck mass was observed [Thyroid Not Enlarged] : the thyroid was not enlarged [No Accessory Muscle Use] : no accessory muscle use [Clear to Auscultation] : lungs were clear to auscultation bilaterally [Normal S1, S2] : normal S1 and S2 [Normal Rate] : heart rate was normal [No Stigmata of Cushings Syndrome] : no stigmata of Cushings Syndrome [Normal Gait] : normal gait [Oriented x3] : oriented to person, place, and time

## 2021-11-03 NOTE — REVIEW OF SYSTEMS
[Recent Weight Gain (___ Lbs)] : recent weight gain: [unfilled] lbs [Visual Field Defect] : visual field defect [Blurred Vision] : blurred vision [Fatigue] : no fatigue [Recent Weight Loss (___ Lbs)] : no recent weight loss [Dysphagia] : no dysphagia [Neck Pain] : no neck pain [Chest Pain] : no chest pain [Palpitations] : no palpitations [Shortness Of Breath] : no shortness of breath [Nausea] : no nausea [Constipation] : no constipation [Vomiting] : no vomiting [Diarrhea] : no diarrhea [Polyuria] : no polyuria [Polydipsia] : no polydipsia

## 2021-11-03 NOTE — ASSESSMENT
[FreeTextEntry1] : 71 y/o female with Type 1 DM, Hypothyroidism, and Hyperlipidemia. \par \par T2DM\par -continue to wear Dexcom\par -Increase Lantus to 5 units in AM and 4 units in PM\par -Continue to use 4-6units of humalog for meals\par -Education provided on not overcompensating to treat a low blood sugar, only have 4 oz OJ or a Tablespoon on PB\par -continue to increase exercise as tolerated, to 5 days a week 30 min/day\par -Have carbs with each meal\par -Make appt with CDE\par \par Hypothyroidism\par -Continue current dose LT4\par -Follow up with optho for second treatment of Tepezza\par \par Discussed plan with Dr. Das\par \par RTO in 3 weeks CDE\par RTO in 6 weeks NP \par RTO with Dr. Das in 8 weeks [Diabetes Foot Care] : diabetes foot care [Long Term Vascular Complications] : long term vascular complications of diabetes [Carbohydrate Consistent Diet] : carbohydrate consistent diet [Importance of Diet and Exercise] : importance of diet and exercise to improve glycemic control, achieve weight loss and improve cardiovascular health [Retinopathy Screening] : Patient was referred to ophthalmology for retinopathy screening

## 2021-11-09 RX ORDER — BLOOD-GLUCOSE,RECEIVER,CONT
EACH MISCELLANEOUS
Qty: 1 | Refills: 2 | Status: ACTIVE | COMMUNITY
Start: 2021-11-09 | End: 1900-01-01

## 2021-11-12 ENCOUNTER — NON-APPOINTMENT (OUTPATIENT)
Age: 71
End: 2021-11-12

## 2021-11-16 ENCOUNTER — APPOINTMENT (OUTPATIENT)
Dept: INTERNAL MEDICINE | Facility: CLINIC | Age: 71
End: 2021-11-16
Payer: MEDICARE

## 2021-11-16 VITALS
WEIGHT: 127 LBS | SYSTOLIC BLOOD PRESSURE: 100 MMHG | HEIGHT: 61 IN | TEMPERATURE: 97.2 F | BODY MASS INDEX: 23.98 KG/M2 | OXYGEN SATURATION: 97 % | RESPIRATION RATE: 14 BRPM | DIASTOLIC BLOOD PRESSURE: 60 MMHG | HEART RATE: 72 BPM

## 2021-11-16 VITALS — SYSTOLIC BLOOD PRESSURE: 110 MMHG | DIASTOLIC BLOOD PRESSURE: 60 MMHG

## 2021-11-16 DIAGNOSIS — Z86.69 PERSONAL HISTORY OF OTHER DISEASES OF THE NERVOUS SYSTEM AND SENSE ORGANS: ICD-10-CM

## 2021-11-16 DIAGNOSIS — R42 DIZZINESS AND GIDDINESS: ICD-10-CM

## 2021-11-16 DIAGNOSIS — N39.0 URINARY TRACT INFECTION, SITE NOT SPECIFIED: ICD-10-CM

## 2021-11-16 DIAGNOSIS — F32.5 MAJOR DEPRESSIVE DISORDER, SINGLE EPISODE, IN FULL REMISSION: ICD-10-CM

## 2021-11-16 DIAGNOSIS — F32.A DEPRESSION, UNSPECIFIED: ICD-10-CM

## 2021-11-16 DIAGNOSIS — R79.89 OTHER SPECIFIED ABNORMAL FINDINGS OF BLOOD CHEMISTRY: ICD-10-CM

## 2021-11-16 DIAGNOSIS — E78.00 PURE HYPERCHOLESTEROLEMIA, UNSPECIFIED: ICD-10-CM

## 2021-11-16 PROCEDURE — 99213 OFFICE O/P EST LOW 20 MIN: CPT

## 2021-11-16 RX ORDER — ASPIRIN 81 MG/1
81 TABLET ORAL
Qty: 90 | Refills: 1 | Status: ACTIVE | COMMUNITY
Start: 2020-03-23 | End: 1900-01-01

## 2021-11-16 NOTE — HEALTH RISK ASSESSMENT
[Not at All (0)] : 9.) Thoughts that you would be off dead or of hurting yourself in some way? Not at all [PHQ-9 Negative - No further assessment needed] : PHQ-9 Negative - No further assessment needed [[id number]] : 0

## 2021-11-16 NOTE — HISTORY OF PRESENT ILLNESS
[Hypertension] : Hypertension [<130/90] : Target blood pressure is  <130/90 [Target goal met] : BP target goal met

## 2021-12-01 ENCOUNTER — APPOINTMENT (OUTPATIENT)
Dept: ENDOCRINOLOGY | Facility: CLINIC | Age: 71
End: 2021-12-01
Payer: MEDICARE

## 2021-12-01 PROCEDURE — 97803 MED NUTRITION INDIV SUBSEQ: CPT

## 2021-12-22 ENCOUNTER — APPOINTMENT (OUTPATIENT)
Dept: ENDOCRINOLOGY | Facility: CLINIC | Age: 71
End: 2021-12-22
Payer: MEDICARE

## 2021-12-22 VITALS — HEIGHT: 61 IN | BODY MASS INDEX: 23.22 KG/M2 | WEIGHT: 123 LBS

## 2021-12-22 VITALS — OXYGEN SATURATION: 98 % | DIASTOLIC BLOOD PRESSURE: 60 MMHG | SYSTOLIC BLOOD PRESSURE: 92 MMHG | HEART RATE: 78 BPM

## 2021-12-22 LAB — GLUCOSE BLDC GLUCOMTR-MCNC: 159

## 2021-12-22 PROCEDURE — 95251 CONT GLUC MNTR ANALYSIS I&R: CPT

## 2021-12-22 PROCEDURE — 82962 GLUCOSE BLOOD TEST: CPT

## 2021-12-22 PROCEDURE — 99214 OFFICE O/P EST MOD 30 MIN: CPT | Mod: 25

## 2021-12-23 ENCOUNTER — APPOINTMENT (OUTPATIENT)
Dept: INTERNAL MEDICINE | Facility: CLINIC | Age: 71
End: 2021-12-23
Payer: MEDICARE

## 2021-12-23 ENCOUNTER — TRANSCRIPTION ENCOUNTER (OUTPATIENT)
Age: 71
End: 2021-12-23

## 2021-12-23 VITALS
RESPIRATION RATE: 14 BRPM | BODY MASS INDEX: 23.22 KG/M2 | HEART RATE: 66 BPM | WEIGHT: 123 LBS | HEIGHT: 61 IN | SYSTOLIC BLOOD PRESSURE: 102 MMHG | OXYGEN SATURATION: 98 % | DIASTOLIC BLOOD PRESSURE: 60 MMHG

## 2021-12-23 PROCEDURE — 99213 OFFICE O/P EST LOW 20 MIN: CPT

## 2021-12-23 NOTE — HISTORY OF PRESENT ILLNESS
[FreeTextEntry8] : Pt with complaint of right 5th digit swelling and pain after injury 1 week ago where she hit it on a wall. The pain is present when she bends the finger. She has not taken anything for the pain.

## 2021-12-23 NOTE — PHYSICAL EXAM
[No Acute Distress] : no acute distress [Well-Appearing] : well-appearing [Normal Voice/Communication] : normal voice/communication [No Respiratory Distress] : no respiratory distress  [No Accessory Muscle Use] : no accessory muscle use [Clear to Auscultation] : lungs were clear to auscultation bilaterally [Normal Rate] : normal rate  [Regular Rhythm] : with a regular rhythm [No Murmur] : no murmur heard [No Focal Deficits] : no focal deficits [Alert and Oriented x3] : oriented to person, place, and time [de-identified] : right 5th digit swelling and pain with flexion and extension at DIP

## 2021-12-27 NOTE — HISTORY OF PRESENT ILLNESS
[FreeTextEntry1] : Pt fell in the summer time  and was in a rehab facility to help with PT and had UTI.\par  8/12/21-10/19/21. She has a 24/7 live in aid. memory has been  off\par \par \par BS have been more stable \par \par Quality: T1DM\par Severity: moderate \par Duration: 48 years\par Onset: leg cramps \par Modifying Factors: insulin\par \par SMBG\par wears DEXCOM\par Average Glucose: 191\par Target Range: 40%\par Very High: 15%\par High: 43%\par Low: 1%\par Very Low: 1% \par Std deviation 55mg/dL\par \par \par Current Regimen:\par Lantus 5  units in AM and 4 units in PM (afriad of sugars going low) \par Humalog- 6-8 units  if if carb heavy meal or 4-5 units for less carbs in meals \par \par Weight: stable \par Diet:watching sugar and carbs, reads every label \par Exercise: physical therapy, walked twice and goes to mall to walk \par Smoking:  none\par \par Hypothyroidism\par -LT4 125 mcg, 1 tablet Monday - Saturday, Skip Sunday \par -Grave's opthalmopathy ++ TSI, had 1 injection tepezza\par

## 2021-12-27 NOTE — PHYSICAL EXAM
[Alert] : alert [Normal Hearing] : hearing was normal [No Neck Mass] : no neck mass was observed [Thyroid Not Enlarged] : the thyroid was not enlarged [No Accessory Muscle Use] : no accessory muscle use [Clear to Auscultation] : lungs were clear to auscultation bilaterally [Normal S1, S2] : normal S1 and S2 [Normal Rate] : heart rate was normal [No Stigmata of Cushings Syndrome] : no stigmata of Cushings Syndrome [Normal Gait] : normal gait [Oriented x3] : oriented to person, place, and time [No Edema] : no peripheral edema [Normal] : normal [Full ROM] : with full range of motion [Diminished Throughout Both Feet] : normal tactile sensation with monofilament testing throughout both feet [de-identified] : left eye with stare  tearing

## 2021-12-27 NOTE — ASSESSMENT
[FreeTextEntry1] : 71 y/o female with Type 1 DM, Hypothyroidism, and Hyperlipidemia. \par \par T2DM\par -continue to wear Dexcom\par -Increase Lantus to 5 units in AM and 4 units in PM\par -Continue to use 6-8units of humalog for meals\par \par -Have carbs with each meal\par -Make appt with CDE\par \par Hypothyroidism\par -Continue current dose LT4\par -Follow up with optho for second treatment of Tepezza\par \par \par memory- will followup with neurology

## 2021-12-28 ENCOUNTER — APPOINTMENT (OUTPATIENT)
Dept: INTERNAL MEDICINE | Facility: CLINIC | Age: 71
End: 2021-12-28
Payer: MEDICARE

## 2021-12-28 VITALS
SYSTOLIC BLOOD PRESSURE: 120 MMHG | BODY MASS INDEX: 23.22 KG/M2 | WEIGHT: 123 LBS | HEIGHT: 61 IN | HEART RATE: 66 BPM | TEMPERATURE: 97.8 F | OXYGEN SATURATION: 98 % | RESPIRATION RATE: 14 BRPM | DIASTOLIC BLOOD PRESSURE: 50 MMHG

## 2021-12-28 DIAGNOSIS — M79.646 PAIN IN UNSPECIFIED FINGER(S): ICD-10-CM

## 2021-12-28 PROCEDURE — 99214 OFFICE O/P EST MOD 30 MIN: CPT

## 2021-12-28 NOTE — END OF VISIT
[FreeTextEntry3] : "I, Virginie Vasquez, personally scribed the services dictated to me by Dr. Chaitanya Acharya MD in this documentation on 12/28/2021 " \par \par "I Dr. Chaitanya Acharya MD, personally performed the services described in this documentation on 12/28/2021 for the patient as scribed by Virginie Vasquez in my presence. I have reviewed and verified that all the information is accurate and true."\par

## 2021-12-28 NOTE — HEALTH RISK ASSESSMENT
[No] : In the past 12 months have you used drugs other than those required for medical reasons? No [No falls in past year] : Patient reported no falls in the past year [0] : 2) Feeling down, depressed, or hopeless: Not at all (0) [PHQ-2 Negative - No further assessment needed] : PHQ-2 Negative - No further assessment needed [[id number]] : 0

## 2021-12-28 NOTE — PHYSICAL EXAM
[No Acute Distress] : no acute distress [Well Nourished] : well nourished [Well Developed] : well developed [Well-Appearing] : well-appearing [Normal Voice/Communication] : normal voice/communication [Normal Sclera/Conjunctiva] : normal sclera/conjunctiva [PERRL] : pupils equal round and reactive to light [EOMI] : extraocular movements intact [Normal Outer Ear/Nose] : the outer ears and nose were normal in appearance [No JVD] : no jugular venous distention [No Lymphadenopathy] : no lymphadenopathy [Supple] : supple [Thyroid Normal, No Nodules] : the thyroid was normal and there were no nodules present [No Respiratory Distress] : no respiratory distress  [No Accessory Muscle Use] : no accessory muscle use [Clear to Auscultation] : lungs were clear to auscultation bilaterally [Normal Rate] : normal rate  [Regular Rhythm] : with a regular rhythm [Normal S1, S2] : normal S1 and S2 [No Murmur] : no murmur heard [No Carotid Bruits] : no carotid bruits [No Abdominal Bruit] : a ~M bruit was not heard ~T in the abdomen [No Varicosities] : no varicosities [Pedal Pulses Present] : the pedal pulses are present [No Edema] : there was no peripheral edema [No Palpable Aorta] : no palpable aorta [No Extremity Clubbing/Cyanosis] : no extremity clubbing/cyanosis [Soft] : abdomen soft [Non Tender] : non-tender [Non-distended] : non-distended [No Masses] : no abdominal mass palpated [No HSM] : no HSM [Normal Bowel Sounds] : normal bowel sounds [Normal Supraclavicular Nodes] : no supraclavicular lymphadenopathy [Normal Anterior Cervical Nodes] : no anterior cervical lymphadenopathy [Normal Posterior Cervical Nodes] : no posterior cervical lymphadenopathy [No CVA Tenderness] : no CVA  tenderness [No Spinal Tenderness] : no spinal tenderness [Grossly Normal Strength/Tone] : grossly normal strength/tone [No Rash] : no rash [Coordination Grossly Intact] : coordination grossly intact [No Focal Deficits] : no focal deficits [Normal Gait] : normal gait [Deep Tendon Reflexes (DTR)] : deep tendon reflexes were 2+ and symmetric [Speech Grossly Normal] : speech grossly normal [Memory Grossly Normal] : memory grossly normal [Normal Affect] : the affect was normal [Alert and Oriented x3] : oriented to person, place, and time [Normal Mood] : the mood was normal [Normal Insight/Judgement] : insight and judgment were intact [de-identified] : swelling of R pinky finger tenderness

## 2021-12-29 ENCOUNTER — APPOINTMENT (OUTPATIENT)
Dept: ENDOCRINOLOGY | Facility: CLINIC | Age: 71
End: 2021-12-29

## 2021-12-29 ENCOUNTER — NON-APPOINTMENT (OUTPATIENT)
Age: 71
End: 2021-12-29

## 2022-01-24 RX ORDER — BLOOD-GLUCOSE TRANSMITTER
EACH MISCELLANEOUS
Qty: 1 | Refills: 1 | Status: ACTIVE | COMMUNITY
Start: 2022-01-24 | End: 1900-01-01

## 2022-02-08 ENCOUNTER — APPOINTMENT (OUTPATIENT)
Dept: INTERNAL MEDICINE | Facility: CLINIC | Age: 72
End: 2022-02-08
Payer: MEDICARE

## 2022-02-08 VITALS
RESPIRATION RATE: 14 BRPM | HEART RATE: 70 BPM | DIASTOLIC BLOOD PRESSURE: 60 MMHG | SYSTOLIC BLOOD PRESSURE: 110 MMHG | BODY MASS INDEX: 22.47 KG/M2 | TEMPERATURE: 97.1 F | HEIGHT: 61 IN | WEIGHT: 119 LBS | OXYGEN SATURATION: 98 %

## 2022-02-08 DIAGNOSIS — Z23 ENCOUNTER FOR IMMUNIZATION: ICD-10-CM

## 2022-02-08 PROCEDURE — G0439: CPT

## 2022-02-08 PROCEDURE — G0009: CPT

## 2022-02-08 PROCEDURE — 90732 PPSV23 VACC 2 YRS+ SUBQ/IM: CPT

## 2022-02-08 RX ORDER — MIRABEGRON 50 MG/1
50 TABLET, FILM COATED, EXTENDED RELEASE ORAL
Refills: 0 | Status: DISCONTINUED | COMMUNITY
End: 2022-02-08

## 2022-02-08 RX ORDER — INSULIN ADMIN. SUPPLIES
30G X 8 MM INSULIN PEN (EA) SUBCUTANEOUS
Qty: 100 | Refills: 0 | Status: ACTIVE | COMMUNITY
Start: 2021-10-21

## 2022-02-08 RX ORDER — AMOXICILLIN AND CLAVULANATE POTASSIUM 875; 125 MG/1; MG/1
875-125 TABLET, COATED ORAL
Qty: 14 | Refills: 0 | Status: DISCONTINUED | COMMUNITY
Start: 2022-01-10

## 2022-02-08 NOTE — PLAN
[FreeTextEntry1] : HCM: \par -had labs December 21, notable for a1c of 8.3 \par -UTD with mammo \par -UTD with DEXA\par -covid vaccine moderna x3\par -PPSV-23 today \par \par HLD: continue praluent, has upcoming appt with cardio-Dr. Kaufman \par T2DM: continue with lantus and novolog, pt is on ARB and statin \par HTN: stable, continue with amlodipine, losartan, HCTZ\par Hypothyroidism: Stable, continue with levothyroxine

## 2022-02-08 NOTE — HISTORY OF PRESENT ILLNESS
[de-identified] : Pt here for CPE. Pt followed by ortho for fracture of right 5th digit fracture. She is followed by endo for thyroid nodules and T2DM. She is taking Lantus 4u in AM and 5u in PM as well as 6units of pre-meal insulin. She has a dexcom sensor, sugars have been labile though pt reports eating well and carb counting. She is being treated for thyroid related eye disease by neuroophthalmology. \par

## 2022-02-14 ENCOUNTER — APPOINTMENT (OUTPATIENT)
Dept: CARDIOLOGY | Facility: CLINIC | Age: 72
End: 2022-02-14

## 2022-03-16 ENCOUNTER — APPOINTMENT (OUTPATIENT)
Dept: ENDOCRINOLOGY | Facility: CLINIC | Age: 72
End: 2022-03-16
Payer: MEDICARE

## 2022-03-16 VITALS
DIASTOLIC BLOOD PRESSURE: 62 MMHG | HEIGHT: 61 IN | WEIGHT: 120 LBS | BODY MASS INDEX: 22.66 KG/M2 | SYSTOLIC BLOOD PRESSURE: 106 MMHG

## 2022-03-16 LAB
GLUCOSE BLDC GLUCOMTR-MCNC: 310
HBA1C MFR BLD HPLC: 8.3
LDLC SERPL DIRECT ASSAY-MCNC: 119

## 2022-03-16 PROCEDURE — 82962 GLUCOSE BLOOD TEST: CPT

## 2022-03-16 PROCEDURE — 99215 OFFICE O/P EST HI 40 MIN: CPT | Mod: 25

## 2022-03-16 PROCEDURE — 95251 CONT GLUC MNTR ANALYSIS I&R: CPT

## 2022-03-16 RX ORDER — BLOOD-GLUCOSE TRANSMITTER
EACH MISCELLANEOUS
Qty: 1 | Refills: 1 | Status: DISCONTINUED | COMMUNITY
Start: 2021-11-09 | End: 2022-03-16

## 2022-03-16 RX ORDER — ALIROCUMAB 150 MG/ML
INJECTION, SOLUTION SUBCUTANEOUS
Refills: 0 | Status: DISCONTINUED | COMMUNITY
End: 2022-03-16

## 2022-03-16 NOTE — ASSESSMENT
[FreeTextEntry1] : 71 y/o female with Type 1 DM, Hypothyroidism, and Hyperlipidemia. \par \par T2DM\par -continue to wear Dexcom\par Cont Lantus to 5 units in AM and 4 units in PM\par -Continue to use 6-8units of humalog tid ac only  but take right before meals \par \par If needs to coorect- wait 4 hrs before correction \par  and no more than 1-2 unit Humalog\par Keep Lantus same\par \par \par may need new Dexcom transmitter - will try that to help with discrepant readings  alos do not forcefully apply Dexcom \par \par Needs to rotate injecitons sites   leave upper arm on left alone- for several months \par gave info on site selection \par \par -Have carbs with each meal\par -Make appt with CDE in 2 weeks to do Download \par \par Hypothyroidism\par -Continue current dose LT4\par -Follow up with optho for second treatment of Tepezza  needs better BS control \par \par \par memory- will followup with neurology

## 2022-03-16 NOTE — PHYSICAL EXAM
[Alert] : alert [Normal Hearing] : hearing was normal [No Neck Mass] : no neck mass was observed [Thyroid Not Enlarged] : the thyroid was not enlarged [No Accessory Muscle Use] : no accessory muscle use [Clear to Auscultation] : lungs were clear to auscultation bilaterally [Normal S1, S2] : normal S1 and S2 [Normal Rate] : heart rate was normal [No Edema] : no peripheral edema [No Stigmata of Cushings Syndrome] : no stigmata of Cushings Syndrome [Normal Gait] : normal gait [Normal] : normal [Full ROM] : with full range of motion [Oriented x3] : oriented to person, place, and time [Diminished Throughout Both Feet] : normal tactile sensation with monofilament testing throughout both feet [de-identified] : left eye with stare  tearing  [de-identified] : Left upper arm with site hypertrophy

## 2022-03-16 NOTE — HISTORY OF PRESENT ILLNESS
[FreeTextEntry1] : Follwo up T1DM\par  Graves ophthalopathy \par Hypothryoidsm due to HT \par \par BS have been more erratic lately \par eg today  around 6 am - took 6 untis Novolog  then BS dropped to after starting to eat at 730 Am  by 8 am BS 53  so now BS increased back to 300 \par \par also noted some discrepancy between Dexcom ANd BS meter- \par \par ALso injecting into same spot in upper arm \par \par pt ehre with AIde and S.O. Mateo \par \par Quality: T1DM\par Severity: moderate \par Duration: 48 years\par Onset: leg cramps \par Modifying Factors: insulin\par \par SMBG\par wears DEXCOM\par Average Glucose: 191\par Target Range: 40%\par Very High: 15%\par High: 43%\par Low: 1%\par Very Low: 1% \par Std deviation 55mg/dL\par \par \par Current Regimen:\par Lantus 5  units in AM and 4 units in PM (afriad of sugars going low) \par Humalog- 6-8 units  tidac but pt also took 6 untis for correction \par \par Weight: stable \par Diet:watching sugar and carbs, reads every label \par Exercise: physical therapy, walked twice and goes to mall to walk \par Smoking:  none\par \par Hypothyroidism\par -LT4 125 mcg, 1 tablet Monday - Saturday, Skip Sunday \par -Grave's opthalmopathy ++ TSI, had 1 injection tepezza needs another \par

## 2022-03-16 NOTE — DISCUSSION/SUMMARY
[FreeTextEntry1] : Staff has been trying to call patient on both numbers provided since yesterday 12/28 to confirm apt be transitioned to TEB. Multiple voicemail's left on both lines, no answers. Pt did not show in personfor apt, [facility] apt.

## 2022-03-30 RX ORDER — BLOOD SUGAR DIAGNOSTIC
STRIP MISCELLANEOUS
Qty: 10 | Refills: 1 | Status: ACTIVE | COMMUNITY
Start: 2019-04-03 | End: 1900-01-01

## 2022-04-06 ENCOUNTER — APPOINTMENT (OUTPATIENT)
Dept: ENDOCRINOLOGY | Facility: CLINIC | Age: 72
End: 2022-04-06
Payer: MEDICARE

## 2022-04-06 VITALS
BODY MASS INDEX: 24.35 KG/M2 | HEIGHT: 61 IN | DIASTOLIC BLOOD PRESSURE: 52 MMHG | SYSTOLIC BLOOD PRESSURE: 122 MMHG | WEIGHT: 129 LBS

## 2022-04-06 PROCEDURE — 82962 GLUCOSE BLOOD TEST: CPT

## 2022-04-06 PROCEDURE — 99215 OFFICE O/P EST HI 40 MIN: CPT | Mod: 25

## 2022-04-06 PROCEDURE — 95251 CONT GLUC MNTR ANALYSIS I&R: CPT

## 2022-04-06 PROCEDURE — 97803 MED NUTRITION INDIV SUBSEQ: CPT

## 2022-04-07 LAB — GLUCOSE BLDC GLUCOMTR-MCNC: 375

## 2022-04-11 PROBLEM — Z11.59 SCREENING FOR VIRAL DISEASE: Status: ACTIVE | Noted: 2020-05-21

## 2022-04-15 ENCOUNTER — APPOINTMENT (OUTPATIENT)
Dept: INTERNAL MEDICINE | Facility: CLINIC | Age: 72
End: 2022-04-15
Payer: MEDICARE

## 2022-04-15 VITALS
SYSTOLIC BLOOD PRESSURE: 132 MMHG | HEART RATE: 73 BPM | TEMPERATURE: 97.3 F | HEIGHT: 61 IN | OXYGEN SATURATION: 99 % | RESPIRATION RATE: 16 BRPM | DIASTOLIC BLOOD PRESSURE: 76 MMHG | BODY MASS INDEX: 22.66 KG/M2 | WEIGHT: 120 LBS

## 2022-04-15 PROCEDURE — 99213 OFFICE O/P EST LOW 20 MIN: CPT

## 2022-04-15 NOTE — PLAN
[FreeTextEntry1] : HLD: continue praluent\par T2DM: continue with lantus and novolog, pt is on ARB and statin \par HTN: stable, continue with amlodipine, losartan, HCTZ\par Hypothyroidism: Stable, continue with levothyroxine\par \par UTD with labs\par has upcoming appt with GI-Dr. Parr for colonoscopy \par has upcoming appt with carido-Dr. Kaufman

## 2022-04-15 NOTE — PHYSICAL EXAM
[No Acute Distress] : no acute distress [Well-Appearing] : well-appearing [Normal Voice/Communication] : normal voice/communication [Normal Oropharynx] : the oropharynx was normal [Normal TMs] : both tympanic membranes were normal [No Respiratory Distress] : no respiratory distress  [No Accessory Muscle Use] : no accessory muscle use [Clear to Auscultation] : lungs were clear to auscultation bilaterally [Normal Rate] : normal rate  [Regular Rhythm] : with a regular rhythm [No Murmur] : no murmur heard [Soft] : abdomen soft [Non Tender] : non-tender [No Masses] : no abdominal mass palpated [Normal Bowel Sounds] : normal bowel sounds [No Focal Deficits] : no focal deficits [Alert and Oriented x3] : oriented to person, place, and time [de-identified] : 2+ pitting edema b/l

## 2022-04-25 ENCOUNTER — APPOINTMENT (OUTPATIENT)
Dept: CARDIOLOGY | Facility: CLINIC | Age: 72
End: 2022-04-25
Payer: MEDICARE

## 2022-04-25 ENCOUNTER — NON-APPOINTMENT (OUTPATIENT)
Age: 72
End: 2022-04-25

## 2022-04-25 VITALS
DIASTOLIC BLOOD PRESSURE: 64 MMHG | WEIGHT: 122 LBS | BODY MASS INDEX: 23.03 KG/M2 | HEART RATE: 57 BPM | HEIGHT: 61 IN | OXYGEN SATURATION: 99 % | SYSTOLIC BLOOD PRESSURE: 162 MMHG

## 2022-04-25 DIAGNOSIS — R06.00 DYSPNEA, UNSPECIFIED: ICD-10-CM

## 2022-04-25 PROCEDURE — 99214 OFFICE O/P EST MOD 30 MIN: CPT

## 2022-04-25 PROCEDURE — 93000 ELECTROCARDIOGRAM COMPLETE: CPT

## 2022-04-25 RX ORDER — ISOPROPYL ALCOHOL 0.7 ML/ML
SWAB TOPICAL
Qty: 3 | Refills: 0 | Status: DISCONTINUED | COMMUNITY
Start: 2021-07-28 | End: 2022-04-25

## 2022-04-28 ENCOUNTER — APPOINTMENT (OUTPATIENT)
Dept: ENDOCRINOLOGY | Facility: CLINIC | Age: 72
End: 2022-04-28
Payer: MEDICARE

## 2022-04-28 PROCEDURE — 97803 MED NUTRITION INDIV SUBSEQ: CPT

## 2022-05-10 ENCOUNTER — RX RENEWAL (OUTPATIENT)
Age: 72
End: 2022-05-10

## 2022-06-02 ENCOUNTER — APPOINTMENT (OUTPATIENT)
Dept: ENDOCRINOLOGY | Facility: CLINIC | Age: 72
End: 2022-06-02
Payer: MEDICARE

## 2022-06-02 LAB — HBA1C MFR BLD HPLC: 8.4

## 2022-06-02 PROCEDURE — 97803 MED NUTRITION INDIV SUBSEQ: CPT | Mod: GA

## 2022-06-06 NOTE — HISTORY OF PRESENT ILLNESS
[FreeTextEntry1] : Follwo up T1DM\par  Graves ophthalopathy \par Hypothryoidsm due to HT \par \par hre with Aide andcompainion Arik \par \par BS have been a bit lesserratic lately  but more towards igher side\par less severe lows  sicne doing siterotatin \par   needs to get  Tepezaa again \par \par \par Dexcom reviews\par Avg  \par  SD 70 \par  GMI 8.3% \par 34% in range \par 34% high \par 30% very hig \par 1% low\par <1% very low \par \par ALso injecting into same spot in upper arm \par \par pt ehre with AIde and S.O. Mateo \par \par Quality: T1DM\par Severity: moderate \par Duration: 48 years\par Onset: leg cramps \par Modifying Factors: insulin\par \par \par \par \par Current Regimen:\par Lantus 5  units in AM and 4 units in PM (afriad of sugars going low) \par Humalog- 6-8 units  tidac but pt also took 6 untis for correction \par \par Weight: stable \par Diet:watching sugar and carbs, reads every label \par Exercise: physical therapy, walked twice and goes to mall to walk \par Smoking:  none\par \par Hypothyroidism\par -LT4 125 mcg, 1 tablet Monday - Saturday, Skip Sunday \par -Grave's opthalmopathy ++ TSI, had 1 injection tepezza needs another \par

## 2022-06-06 NOTE — PHYSICAL EXAM
[Alert] : alert [Normal Hearing] : hearing was normal [No Neck Mass] : no neck mass was observed [Thyroid Not Enlarged] : the thyroid was not enlarged [No Accessory Muscle Use] : no accessory muscle use [Clear to Auscultation] : lungs were clear to auscultation bilaterally [Normal S1, S2] : normal S1 and S2 [Normal Rate] : heart rate was normal [No Edema] : no peripheral edema [No Stigmata of Cushings Syndrome] : no stigmata of Cushings Syndrome [Normal Gait] : normal gait [Normal] : normal [Full ROM] : with full range of motion [Oriented x3] : oriented to person, place, and time [Diminished Throughout Both Feet] : normal tactile sensation with monofilament testing throughout both feet [de-identified] : left eye with stare  tearing  [de-identified] : Left upper arm with site hypertrophy

## 2022-06-06 NOTE — ASSESSMENT
[FreeTextEntry1] : 69 y/o female with Type 1 DM, Hypothyroidism, and Hyperlipidemia. \par \par T2DM\par -continue to wear Dexcom\par Inc  Lantus to 5 units in AM and5 units in PM so may not need to correct so much with HUmalog \par -Continue to use 6-8units of humalog tid ac only  but take right before meals \par \par If needs to coorect- wait 4 hrs before correction \par  and no more than 2 unit Humalog  overnight  soemtimes overnight was usign 4-6 untis Humalog \par Keep Lantus same\par \par Keep rotate injecitons sites   leave upper arm on left alone- for several months \par gave info on site selection \par \par \par Hypothyroidism\par -Continue current dose LT4\par \par Graves ophtalmolopathy Follow up with optho for second treatment of Tepezza  needs better BS control first \par  corbin ee how she does over next few weeks \par \par \par memory- will followup with neurology \par \par Glucose Sensor Necessity:  This patient with diabetes performs 4 or more glucose checks per day utilizing a home blood glucose monitor.  The patient is treated with insulin via 3 or more injections daily.  This patient requires frequent adjustments to their insulin treatment on the basis of therapeutic continuous glucose monitoring results.  In addition, the patient has been to our office for an evaluation of their diabetes control within the past 6 months.  \par \par

## 2022-06-08 ENCOUNTER — APPOINTMENT (OUTPATIENT)
Dept: ENDOCRINOLOGY | Facility: CLINIC | Age: 72
End: 2022-06-08
Payer: MEDICARE

## 2022-06-08 VITALS — HEART RATE: 62 BPM | OXYGEN SATURATION: 98 % | DIASTOLIC BLOOD PRESSURE: 52 MMHG | SYSTOLIC BLOOD PRESSURE: 110 MMHG

## 2022-06-08 VITALS — HEIGHT: 61 IN | WEIGHT: 122 LBS | BODY MASS INDEX: 23.03 KG/M2

## 2022-06-08 LAB — GLUCOSE BLDC GLUCOMTR-MCNC: 142

## 2022-06-08 PROCEDURE — 82962 GLUCOSE BLOOD TEST: CPT

## 2022-06-08 PROCEDURE — 99214 OFFICE O/P EST MOD 30 MIN: CPT | Mod: 25

## 2022-06-08 PROCEDURE — 95251 CONT GLUC MNTR ANALYSIS I&R: CPT

## 2022-06-13 NOTE — PHYSICAL EXAM
[Alert] : alert [Normal Hearing] : hearing was normal [No Neck Mass] : no neck mass was observed [Thyroid Not Enlarged] : the thyroid was not enlarged [No Accessory Muscle Use] : no accessory muscle use [Clear to Auscultation] : lungs were clear to auscultation bilaterally [Normal S1, S2] : normal S1 and S2 [Normal Rate] : heart rate was normal [No Edema] : no peripheral edema [No Stigmata of Cushings Syndrome] : no stigmata of Cushings Syndrome [Normal Gait] : normal gait [Normal] : normal [Full ROM] : with full range of motion [Diminished Throughout Both Feet] : normal tactile sensation with monofilament testing throughout both feet [Oriented x3] : oriented to person, place, and time [de-identified] : left eye with stare    [de-identified] : Left upper arm with site hypertrophy

## 2022-06-13 NOTE — ASSESSMENT
[FreeTextEntry1] : 69 y/o female with Type 1 DM, Hypothyroidism, and Hyperlipidemia. \par \par T1DM\par -continue to wear Dexcom\par Inc  Lantus to 6 units in AM and 6  units in PM so may not need to correct so much with HUmalog \par -Continue to use 6-8 units of humalog tid ac only  but take right before meals \par \par  and no more than 2 unit Humalog  overnight  not use > 4 units Humalog  \par Keep Lantus same\par \par Keep rotate injecitons sites   leave upper arm on left alone- for several months \par gave info on site selection \par \par \par Hypothyroidism\par -Continue current dose LT4\par \par Graves ophtalmolopathy Follow up with optho for second treatment of Tepezza  needs better BS control first \par  will see how she does over next few weeks \par \par \par memory- will followup with neurology \par \par Glucose Sensor Necessity:  This patient with diabetes performs 4 or more glucose checks per day utilizing a home blood glucose monitor.  The patient is treated with insulin via 3 or more injections daily.  This patient requires frequent adjustments to their insulin treatment on the basis of therapeutic continuous glucose monitoring results.  In addition, the patient has been to our office for an evaluation of their diabetes control within the past 6 months.  \par \par

## 2022-06-13 NOTE — REVIEW OF SYSTEMS
[Fatigue] : no fatigue [Recent Weight Gain (___ Lbs)] : recent weight gain: [unfilled] lbs [Recent Weight Loss (___ Lbs)] : no recent weight loss [Visual Field Defect] : visual field defect [Blurred Vision] : blurred vision [Dysphagia] : no dysphagia [Neck Pain] : no neck pain [Chest Pain] : no chest pain [Palpitations] : no palpitations [Shortness Of Breath] : no shortness of breath [Nausea] : no nausea [Constipation] : no constipation [Vomiting] : no vomiting [Diarrhea] : no diarrhea [Polyuria] : no polyuria [Polydipsia] : no polydipsia

## 2022-06-13 NOTE — HISTORY OF PRESENT ILLNESS
[FreeTextEntry1] : Follwo up T1DM\par  Graves ophthalopathy   now wearign prsims \par Hypothryoidsm due to HT \par \par hre with Aide \par \par  Aide herlps her durign week - \par   Arik helps on weekend\par \par BS have been a bit lesserratic lately  but more towards higher side in PM  still using up to 4 units Novolog to correct overnight  \par   needs to get  Tepezaa again \par \par \par Dexcom reviews\par Avg \par  SD 70 \par  GMI 8.3% \par 31% in range \par 27% high \par 40% very hig \par 1% low\par <1% very low \par SD 75  GMI 8.7% \par ALso injecting into same spot in upper arm \par \par \par Quality: T1DM\par Severity: moderate \par Duration: 48 years\par Onset: leg cramps \par Modifying Factors: insulin\par \par \par \par \par Current Regimen:\par Lantus 5  units in AM and 4 units in PM (afriad of sugars going low) \par Humalog- 6-8 units  tidac but pt also took 6 untis for correction \par \par Weight: stable \par Diet:watching sugar and carbs, reads every label \par Exercise: physical therapy, walked twice and goes to mall to walk \par Smoking:  none\par \par Hypothyroidism\par -LT4 125 mcg, 1 tablet Monday - Saturday, Skip Sunday \par -Grave's opthalmopathy ++ TSI, had 1 injection tepezza needs another \par

## 2022-06-30 ENCOUNTER — APPOINTMENT (OUTPATIENT)
Dept: ENDOCRINOLOGY | Facility: CLINIC | Age: 72
End: 2022-06-30

## 2022-06-30 PROCEDURE — G0108 DIAB MANAGE TRN  PER INDIV: CPT

## 2022-07-08 RX ORDER — BLOOD-GLUCOSE SENSOR
EACH MISCELLANEOUS
Qty: 3 | Refills: 1 | Status: ACTIVE | COMMUNITY
Start: 2022-01-24 | End: 1900-01-01

## 2022-07-13 ENCOUNTER — APPOINTMENT (OUTPATIENT)
Dept: ENDOCRINOLOGY | Facility: CLINIC | Age: 72
End: 2022-07-13

## 2022-07-27 ENCOUNTER — APPOINTMENT (OUTPATIENT)
Dept: ENDOCRINOLOGY | Facility: CLINIC | Age: 72
End: 2022-07-27

## 2022-07-27 PROCEDURE — G0108 DIAB MANAGE TRN  PER INDIV: CPT

## 2022-08-01 ENCOUNTER — APPOINTMENT (OUTPATIENT)
Dept: INTERNAL MEDICINE | Facility: CLINIC | Age: 72
End: 2022-08-01

## 2022-08-02 ENCOUNTER — APPOINTMENT (OUTPATIENT)
Dept: INTERNAL MEDICINE | Facility: CLINIC | Age: 72
End: 2022-08-02
Payer: MEDICARE

## 2022-08-02 VITALS
HEIGHT: 61 IN | HEART RATE: 63 BPM | RESPIRATION RATE: 15 BRPM | WEIGHT: 125 LBS | DIASTOLIC BLOOD PRESSURE: 56 MMHG | TEMPERATURE: 97.3 F | SYSTOLIC BLOOD PRESSURE: 112 MMHG | BODY MASS INDEX: 23.6 KG/M2

## 2022-08-02 PROCEDURE — 99213 OFFICE O/P EST LOW 20 MIN: CPT

## 2022-08-02 NOTE — PLAN
[FreeTextEntry1] : HLD: continue praluent\par T2DM: continue with lantus and novolog, pt is on ARB and statin, followed by endo \par HTN: stable, continue with amlodipine, losartan, HCTZ\par Hypothyroidism: Stable, continue with levothyroxine\par \par UTD with labs\par \par

## 2022-08-10 ENCOUNTER — APPOINTMENT (OUTPATIENT)
Dept: ENDOCRINOLOGY | Facility: CLINIC | Age: 72
End: 2022-08-10

## 2022-08-18 RX ORDER — BLOOD-GLUCOSE TRANSMITTER
EACH MISCELLANEOUS
Qty: 1 | Refills: 1 | Status: ACTIVE | COMMUNITY
Start: 2022-08-18 | End: 1900-01-01

## 2022-09-12 ENCOUNTER — RX RENEWAL (OUTPATIENT)
Age: 72
End: 2022-09-12

## 2022-10-11 ENCOUNTER — APPOINTMENT (OUTPATIENT)
Dept: ENDOCRINOLOGY | Facility: CLINIC | Age: 72
End: 2022-10-11
Payer: MEDICARE

## 2022-10-11 VITALS
DIASTOLIC BLOOD PRESSURE: 78 MMHG | HEIGHT: 61 IN | WEIGHT: 131 LBS | BODY MASS INDEX: 24.73 KG/M2 | SYSTOLIC BLOOD PRESSURE: 120 MMHG

## 2022-10-11 DIAGNOSIS — E16.1 OTHER HYPOGLYCEMIA: ICD-10-CM

## 2022-10-11 PROCEDURE — 95251 CONT GLUC MNTR ANALYSIS I&R: CPT

## 2022-10-11 PROCEDURE — 99214 OFFICE O/P EST MOD 30 MIN: CPT | Mod: 25

## 2022-10-11 RX ORDER — INSULIN ASPART 100 [IU]/ML
100 INJECTION, SOLUTION INTRAVENOUS; SUBCUTANEOUS
Qty: 70 | Refills: 1 | Status: DISCONTINUED | COMMUNITY
Start: 2018-07-05 | End: 2022-10-11

## 2022-10-11 NOTE — ASSESSMENT
[FreeTextEntry1] : T1DM\par -Pt appears to be doing well, follows a very strict diet and exercise routine with help of rober Jimenez however does seem to be too aggressive with correction boluses, specifically at bedtime\par -Do not inject more then 4 units of Novolog prior to bed, would rather her run mid to high 100s overnight then have overnight lows\par -Ok to continue Novolog averaging 6 units TID with meals and Lantus 7 units BID\par -Continue DEXCOM use\par -Continue to follow up with all specialists including ophtho and podiatry\par \par Hypothyroidism\par -Need updated TFTs , continue LT4 125 mcg daily\par Patient advised to take every day in the morning, on an empty stomach, and with no other medications. \par -RX given for updated thyroid sonogram\par \par HLD\par -On Praluent \par -Need updated lipid panel\par \par Vit D Def\par -Updated levels due with labs\par \par Vit B Def\par -Updated levels due with labs\par \par Fasting labs needed asap\par RTO 4 weeks with Dr. Das

## 2022-10-11 NOTE — HISTORY OF PRESENT ILLNESS
[FreeTextEntry1] : Here with aidtomy Jimenez\par \par Quality: T1DM\par Severity: moderate \par Duration: 48 years\par Onset: leg cramps \par Modifying Factors: insulin\par \par SMBG\par DEXCOM\par Average glucose 181\par SD 63\par GMI 7.6%\par \par 13% Very high \par 37% High\par 48% In range \par 1% Low \par <1% Very low \par \par Current drug regimen\par Lantus 7 units BID\par Novolog  6 units TID with meals (based off meal shes about to eat or what her current sugar is )\par Gives corrections upwards of 4-6 units at times, sometimes at night before bed (then has some middle of the night hypoglycemia) \par \par Eye exam: no longer getting tapezza- has prism lens - LV 9/2022\par Foot exam: last visit 2 weeks ago \par Kidney disease: denies \par Heart disease: denies \par \par Weight: stable\par Diet: follows a diabetic diet \par Exercise; goes to exercise daily \par Smoking: denies \par \par Graves ophthalmopathy now wearing prisms -has gotten Tapezza in the past \par Hypothyroidism due to HT \par \par Need updated TFTs\par Current regimen\par LT4 125 mcg daily\par Patient advised to take every day in the morning, on an empty stomach, and with no other medications. \par \par Vit D Def\par -On MTV \par \par Vit B Def\par -On MTV

## 2022-10-11 NOTE — PHYSICAL EXAM
[Alert] : alert [Well Nourished] : well nourished [No Acute Distress] : no acute distress [Normal Sclera/Conjunctiva] : normal sclera/conjunctiva [Normal Hearing] : hearing was normal [Thyroid Not Enlarged] : the thyroid was not enlarged [No Accessory Muscle Use] : no accessory muscle use [Clear to Auscultation] : lungs were clear to auscultation bilaterally [Normal S1, S2] : normal S1 and S2 [Normal Rate] : heart rate was normal [No Edema] : no peripheral edema [Not Tender] : non-tender [Soft] : abdomen soft [Normal Gait] : normal gait [No Rash] : no rash [No Tremors] : no tremors [Oriented x3] : oriented to person, place, and time [de-identified] : +1 edema in right LE

## 2022-10-13 ENCOUNTER — APPOINTMENT (OUTPATIENT)
Dept: ULTRASOUND IMAGING | Facility: CLINIC | Age: 72
End: 2022-10-13

## 2022-10-13 PROCEDURE — 76536 US EXAM OF HEAD AND NECK: CPT

## 2022-10-17 ENCOUNTER — NON-APPOINTMENT (OUTPATIENT)
Age: 72
End: 2022-10-17

## 2022-10-21 ENCOUNTER — NON-APPOINTMENT (OUTPATIENT)
Age: 72
End: 2022-10-21

## 2022-10-28 ENCOUNTER — NON-APPOINTMENT (OUTPATIENT)
Age: 72
End: 2022-10-28

## 2022-10-31 ENCOUNTER — NON-APPOINTMENT (OUTPATIENT)
Age: 72
End: 2022-10-31

## 2022-11-01 ENCOUNTER — APPOINTMENT (OUTPATIENT)
Dept: INTERNAL MEDICINE | Facility: CLINIC | Age: 72
End: 2022-11-01
Payer: MEDICARE

## 2022-11-01 VITALS
BODY MASS INDEX: 24.73 KG/M2 | WEIGHT: 131 LBS | DIASTOLIC BLOOD PRESSURE: 50 MMHG | RESPIRATION RATE: 14 BRPM | TEMPERATURE: 98.2 F | HEART RATE: 71 BPM | SYSTOLIC BLOOD PRESSURE: 120 MMHG | HEIGHT: 61 IN | OXYGEN SATURATION: 100 %

## 2022-11-01 DIAGNOSIS — R39.9 UNSPECIFIED SYMPTOMS AND SIGNS INVOLVING THE GENITOURINARY SYSTEM: ICD-10-CM

## 2022-11-01 PROCEDURE — 99213 OFFICE O/P EST LOW 20 MIN: CPT

## 2022-11-02 ENCOUNTER — NON-APPOINTMENT (OUTPATIENT)
Age: 72
End: 2022-11-02

## 2022-11-02 NOTE — HISTORY OF PRESENT ILLNESS
[FreeTextEntry8] : Pt with complaint of frequent urination and dark colored urine for past 5 days. Denies dysuria, fever/chills, hematuria, or flank pain. Of note, pt's sugars have been very high past few weeks, ranging from 300-500s. Working with endo, lantus 10u was added.

## 2022-11-02 NOTE — PHYSICAL EXAM
[No Acute Distress] : no acute distress [Well-Appearing] : well-appearing [Normal Voice/Communication] : normal voice/communication [No Respiratory Distress] : no respiratory distress  [No Accessory Muscle Use] : no accessory muscle use [Clear to Auscultation] : lungs were clear to auscultation bilaterally [Normal Rate] : normal rate  [Regular Rhythm] : with a regular rhythm [No Murmur] : no murmur heard [No CVA Tenderness] : no CVA  tenderness [No Focal Deficits] : no focal deficits [Alert and Oriented x3] : oriented to person, place, and time

## 2022-11-03 LAB
APPEARANCE: CLEAR
BACTERIA UR CULT: NORMAL
BACTERIA: NEGATIVE
BILIRUBIN URINE: NEGATIVE
BLOOD URINE: NEGATIVE
COLOR: NORMAL
GLUCOSE QUALITATIVE U: ABNORMAL
HYALINE CASTS: 0 /LPF
KETONES URINE: NEGATIVE
LEUKOCYTE ESTERASE URINE: NEGATIVE
MICROSCOPIC-UA: NORMAL
NITRITE URINE: NEGATIVE
PH URINE: 7
PROTEIN URINE: NEGATIVE
RED BLOOD CELLS URINE: 1 /HPF
SPECIFIC GRAVITY URINE: 1.01
SQUAMOUS EPITHELIAL CELLS: 0 /HPF
UROBILINOGEN URINE: NORMAL
WHITE BLOOD CELLS URINE: 0 /HPF

## 2022-11-07 ENCOUNTER — NON-APPOINTMENT (OUTPATIENT)
Age: 72
End: 2022-11-07

## 2022-11-14 ENCOUNTER — RX RENEWAL (OUTPATIENT)
Age: 72
End: 2022-11-14

## 2022-11-23 LAB
LDLC SERPL DIRECT ASSAY-MCNC: 73
MICROALBUMIN/CREAT 24H UR-RTO: 28.2
TSH SERPL-ACNC: 2.34

## 2022-11-30 ENCOUNTER — APPOINTMENT (OUTPATIENT)
Dept: ENDOCRINOLOGY | Facility: CLINIC | Age: 72
End: 2022-11-30
Payer: MEDICARE

## 2022-12-01 ENCOUNTER — APPOINTMENT (OUTPATIENT)
Dept: ENDOCRINOLOGY | Facility: CLINIC | Age: 72
End: 2022-12-01
Payer: MEDICARE

## 2022-12-01 PROCEDURE — 99215 OFFICE O/P EST HI 40 MIN: CPT | Mod: 25,95

## 2022-12-02 LAB — HBA1C MFR BLD HPLC: 7.6

## 2022-12-08 NOTE — DATA REVIEWED
[FreeTextEntry1] : DExcom reviewd \par 41% in range\par 2% high \par 7% very high \par 4% low\par  <1% very low\par  avg glcuose 192\par SD 78 \par GMI 7.9%

## 2022-12-19 NOTE — ASSESSMENT
[FreeTextEntry1] : T1DM\par will try new long acting insulin- tresiba- may be better ramon bid LAntus dosing  will have supervise dinjecitonin afternoon with Aide or SO\par  pt and aide will talk with son about having someone there with her 5 days per week and SO on weekened\par \par \par change to 12 unit tresiba starting Friday afternoon \par Keep Novolog 4- 6 tid ac\par \par \par memory - needs follow up with NEuro \par Hypothyroidism\par -Need updated TFTs , continue LT4 125 mcg 1 tab Mon thru  Sat  1/2 onSUn \par Patient advised to take every day in the morning, on an empty stomach, and with no other medications. \par HLD\par -On Praluent \par -Need updated lipid panel\par \par Vit D Def\par -Updated levels due with labs\par \par memory- follow up with PMD and NEuro\par    \par Graves ophthalmolpathy - off YTepezza  seems stable - to see Neuro opthalmology \par \par Vit B Def\par -Updated levels due with labs\par \par Fasting labs needed asap\par RTO 4 weeks with Dr. Das

## 2022-12-27 ENCOUNTER — RX RENEWAL (OUTPATIENT)
Age: 72
End: 2022-12-27

## 2023-01-04 ENCOUNTER — APPOINTMENT (OUTPATIENT)
Dept: ENDOCRINOLOGY | Facility: CLINIC | Age: 73
End: 2023-01-04
Payer: MEDICARE

## 2023-01-04 VITALS
OXYGEN SATURATION: 98 % | SYSTOLIC BLOOD PRESSURE: 116 MMHG | BODY MASS INDEX: 24.55 KG/M2 | HEART RATE: 78 BPM | WEIGHT: 130 LBS | HEIGHT: 61 IN | DIASTOLIC BLOOD PRESSURE: 60 MMHG

## 2023-01-04 LAB — GLUCOSE BLDC GLUCOMTR-MCNC: 295

## 2023-01-04 PROCEDURE — 99214 OFFICE O/P EST MOD 30 MIN: CPT | Mod: 25

## 2023-01-04 PROCEDURE — 82962 GLUCOSE BLOOD TEST: CPT

## 2023-01-04 PROCEDURE — 95251 CONT GLUC MNTR ANALYSIS I&R: CPT

## 2023-01-04 NOTE — PHYSICAL EXAM
[Alert] : alert [No Neck Mass] : no neck mass was observed [No LAD] : no lymphadenopathy [Thyroid Not Enlarged] : the thyroid was not enlarged [No Respiratory Distress] : no respiratory distress [No Accessory Muscle Use] : no accessory muscle use [Clear to Auscultation] : lungs were clear to auscultation bilaterally [Normal S1, S2] : normal S1 and S2 [Normal Rate] : heart rate was normal [Regular Rhythm] : with a regular rhythm [No Edema] : no peripheral edema [Pedal Pulses Normal] : the pedal pulses are present [No Varicosities] : there were no varicosital changes [Normal Supraclavicular Nodes] : no supraclavicular lymphadenopathy [Normal Inguinal Nodes] : no inguinal lymphadenopathy [No Rash] : no rash [Delayed in the Right Toes] : normal in the toes [Normal] : normal [Full ROM] : with full range of motion [Delayed in the Left Toes] : normal in the toes [2+] : 2+ in the dorsalis pedis [Diminished Throughout Both Feet] : normal tactile sensation with monofilament testing throughout both feet [Oriented x3] : oriented to person, place, and time [Normal Affect] : the affect was normal [Normal Mood] : the mood was normal [de-identified] : no further eyepatch

## 2023-01-04 NOTE — HISTORY OF PRESENT ILLNESS
[FreeTextEntry1] : \par Pt seen  with rober Jimenez \par \par \par Quality: T1DM\par Severity: moderate \par Duration: 48 years\par Onset: leg cramps \par Modifying Factors: insulin\par \par SMBG\par DEXCOM\par Average glucose 216\par SD 77\par GMI 8.5%\par \par 38% Very high \par 32% High\par 27% In range \par 1% Low \par 2% Very low \par Avg Glucose 230   STd deviaiton 94 \par Current drug regimen\par Tresiba 12 unit at 2PM \par  Jadatomy Barbara is present  5 days per week and has S.O  on weekend to help \par Novolog  6 units TID with meals (based off meal shes about to eat or what her current sugar is )\par \par \par  S.O on weekendEye exam: no longer getting tapezza- has prism lens - LV 9/2022\par Foot exam: last visit 2 weeks ago \par Kidney disease: denies \par Heart disease: denies \par \par Weight: stable\par Diet: follows a diabetic diet \par Exercise; goes to exercise daily \par Smoking: denies \par \par Graves ophthalmopathy now wearing prisms -has gotten Tapezza in the past \par Hypothyroidism due to HT \par \par Need updated TFTs\par Current regimen\par LT4 125 mcg daily at (2Am  takes with other meds \par   but now the aide tells mept  may be also on a a green thryoid pill in AM \par  takes 1/2 pill daily on empty stoamch at 630 AM ? 0.088 mg LT4\par \par \par Vit D Def\par -On MTV \par \par Vit B Def\par -On MTV \par \par Memory issue0  seen by Neurology  memory issue is ludwig

## 2023-01-04 NOTE — ASSESSMENT
[FreeTextEntry1] : T1DM\par Cont Tresiba 12 untis daily \par Keep Novolog 4- 6 tid ac\par \par some high glycemic excursions   but some diet related  - th on 12/24 - pt high all day  does not recall why\par \par  numbers seem more steady overall \par \par some low BS in evening -- will  try not to overcorrect \par \par memory -per Neuro all stbale\par  has aide with her 5 dys per week and S.O on weekends \par aide helps her to change Dexcom \par \par Hypothyroidism- will get back to me with name of meds  she istaking in AM -- ?LT4 0.088 mg 1/2 tab daily \par also ON LT4 0.125 mg qd  takign with other food at 9AM \par \par HLD\par -On Praluent \par -Need updated lipid panel\par \par Vit D Def\par -Updated levels due with labs\par \par    \par Graves ophthalmolpathy - off Tepezza  seems stable - to see Neuro opthalmology \par \par Vit B Def\par -Updated levels due with labs\par \par \par see me in 8 weeks

## 2023-01-08 ENCOUNTER — RX RENEWAL (OUTPATIENT)
Age: 73
End: 2023-01-08

## 2023-01-11 ENCOUNTER — NON-APPOINTMENT (OUTPATIENT)
Age: 73
End: 2023-01-11

## 2023-01-13 ENCOUNTER — NON-APPOINTMENT (OUTPATIENT)
Age: 73
End: 2023-01-13

## 2023-01-13 ENCOUNTER — APPOINTMENT (OUTPATIENT)
Dept: INTERNAL MEDICINE | Facility: CLINIC | Age: 73
End: 2023-01-13
Payer: MEDICARE

## 2023-01-13 VITALS
SYSTOLIC BLOOD PRESSURE: 112 MMHG | DIASTOLIC BLOOD PRESSURE: 50 MMHG | OXYGEN SATURATION: 97 % | WEIGHT: 126 LBS | HEART RATE: 69 BPM | BODY MASS INDEX: 23.79 KG/M2 | RESPIRATION RATE: 14 BRPM | TEMPERATURE: 98.4 F | HEIGHT: 61 IN

## 2023-01-13 PROCEDURE — G0439: CPT

## 2023-01-13 PROCEDURE — 93000 ELECTROCARDIOGRAM COMPLETE: CPT

## 2023-01-13 NOTE — HISTORY OF PRESENT ILLNESS
[FreeTextEntry1] : annual physical [de-identified] : Patient is 73 y/o F with DM on insulin, HTN, HLD here for annual physical. Pt is c/o dry cough that began 2 days ago, no associated fever, chills , no ear pain/ discharge/ no scratchy throat. Reports not taking anything for the cough at the home. Cough is worse early morning, taking herbal tea at home. Denies sick contacts. Lives with house aide, 6 hrs per day. \azam Reports she was seen by endocrinologist Dr Das last week, taking tresiba 12 U daily and novolog 4-6 U pre-meals. \azam Has f/u appointment with ophthalmologist  next week , using prism lenses. \azam

## 2023-01-13 NOTE — HEALTH RISK ASSESSMENT
[Patient reported mammogram was normal] : Patient reported mammogram was normal [MammogramDate] : 01/22 [ColonoscopyDate] : 01/22 [ColonoscopyComments] : cologuard

## 2023-01-13 NOTE — PLAN
[FreeTextEntry1] : # annual physical\par - will do blood work\par - UTD with mammo and DEXA \par - cologuard with GI last year was negative\par - vaccine UTD flu, covid\par \par # Cough\par - likely 2/2 viral URI\par - Flonase PRN\par - tessalon pearls PRN\par \par #DM\par - continue tresiba 12 U daily and novolog 4-6U pre-meals\par - followed by endo Dr Das \par - Podiatry seen last year, f/u\par - microalbumin checked Oct 2022\par \par \par # Hypothyroidism\par - continue Synthroid\par - thyroid panel  \par \par #HTN \par - stable \par -continue with amlodipine, HCTZ, and losartan \par

## 2023-01-19 DIAGNOSIS — R05.9 COUGH, UNSPECIFIED: ICD-10-CM

## 2023-01-19 LAB
25(OH)D3 SERPL-MCNC: 43.9 NG/ML
ALBUMIN SERPL ELPH-MCNC: 3.6 G/DL
ALP BLD-CCNC: 78 U/L
ALT SERPL-CCNC: 15 U/L
ANION GAP SERPL CALC-SCNC: 11 MMOL/L
AST SERPL-CCNC: 15 U/L
BASOPHILS # BLD AUTO: 0.02 K/UL
BASOPHILS NFR BLD AUTO: 0.2 %
BILIRUB SERPL-MCNC: 0.2 MG/DL
BUN SERPL-MCNC: 18 MG/DL
CALCIUM SERPL-MCNC: 8.9 MG/DL
CHLORIDE SERPL-SCNC: 100 MMOL/L
CHOLEST SERPL-MCNC: 151 MG/DL
CO2 SERPL-SCNC: 28 MMOL/L
CREAT SERPL-MCNC: 0.76 MG/DL
EGFR: 82 ML/MIN/1.73M2
EOSINOPHIL # BLD AUTO: 0.27 K/UL
EOSINOPHIL NFR BLD AUTO: 3.4 %
ESTIMATED AVERAGE GLUCOSE: 194 MG/DL
FERRITIN SERPL-MCNC: 181 NG/ML
GLUCOSE SERPL-MCNC: 262 MG/DL
HBA1C MFR BLD HPLC: 8.4 %
HCT VFR BLD CALC: 32.6 %
HDLC SERPL-MCNC: 79 MG/DL
HGB BLD-MCNC: 10.7 G/DL
IMM GRANULOCYTES NFR BLD AUTO: 0.1 %
IRON SATN MFR SERPL: 14 %
IRON SERPL-MCNC: 34 UG/DL
LDLC SERPL CALC-MCNC: 64 MG/DL
LYMPHOCYTES # BLD AUTO: 1.31 K/UL
LYMPHOCYTES NFR BLD AUTO: 16.3 %
MAN DIFF?: NORMAL
MCHC RBC-ENTMCNC: 31.8 PG
MCHC RBC-ENTMCNC: 32.8 GM/DL
MCV RBC AUTO: 96.7 FL
MONOCYTES # BLD AUTO: 0.72 K/UL
MONOCYTES NFR BLD AUTO: 9 %
NEUTROPHILS # BLD AUTO: 5.71 K/UL
NEUTROPHILS NFR BLD AUTO: 71 %
NONHDLC SERPL-MCNC: 72 MG/DL
PLATELET # BLD AUTO: 275 K/UL
POTASSIUM SERPL-SCNC: 4.7 MMOL/L
PROT SERPL-MCNC: 6.4 G/DL
RBC # BLD: 3.37 M/UL
RBC # FLD: 13.4 %
SODIUM SERPL-SCNC: 138 MMOL/L
TIBC SERPL-MCNC: 238 UG/DL
TRIGL SERPL-MCNC: 40 MG/DL
TSH SERPL-ACNC: 2.89 UIU/ML
UIBC SERPL-MCNC: 204 UG/DL
WBC # FLD AUTO: 8.04 K/UL

## 2023-01-25 ENCOUNTER — RX RENEWAL (OUTPATIENT)
Age: 73
End: 2023-01-25

## 2023-01-25 RX ORDER — BLOOD-GLUCOSE SENSOR
EACH MISCELLANEOUS
Qty: 6 | Refills: 0 | Status: ACTIVE | COMMUNITY
Start: 2021-11-09 | End: 1900-01-01

## 2023-01-25 RX ORDER — BLOOD-GLUCOSE SENSOR
EACH MISCELLANEOUS
Qty: 9 | Refills: 1 | Status: ACTIVE | COMMUNITY
Start: 2023-01-23 | End: 1900-01-01

## 2023-01-31 NOTE — ED ADULT NURSE NOTE - PMH
Diabetes mellitus  type 1  Graves disease    Hyperlipidemia    
[email or web address]

## 2023-02-01 ENCOUNTER — APPOINTMENT (OUTPATIENT)
Dept: ENDOCRINOLOGY | Facility: CLINIC | Age: 73
End: 2023-02-01
Payer: MEDICARE

## 2023-02-01 PROCEDURE — 97803 MED NUTRITION INDIV SUBSEQ: CPT

## 2023-03-01 ENCOUNTER — APPOINTMENT (OUTPATIENT)
Dept: ENDOCRINOLOGY | Facility: CLINIC | Age: 73
End: 2023-03-01
Payer: MEDICARE

## 2023-03-01 VITALS
BODY MASS INDEX: 23.79 KG/M2 | RESPIRATION RATE: 14 BRPM | HEIGHT: 61 IN | DIASTOLIC BLOOD PRESSURE: 55 MMHG | OXYGEN SATURATION: 97 % | SYSTOLIC BLOOD PRESSURE: 100 MMHG | WEIGHT: 126 LBS | HEART RATE: 68 BPM

## 2023-03-01 LAB — GLUCOSE BLDC GLUCOMTR-MCNC: 112

## 2023-03-01 PROCEDURE — 99215 OFFICE O/P EST HI 40 MIN: CPT | Mod: 25

## 2023-03-01 PROCEDURE — 95251 CONT GLUC MNTR ANALYSIS I&R: CPT

## 2023-03-01 RX ORDER — INSULIN GLARGINE 100 [IU]/ML
100 INJECTION, SOLUTION SUBCUTANEOUS TWICE DAILY
Qty: 2 | Refills: 1 | Status: DISCONTINUED | COMMUNITY
Start: 2021-08-04 | End: 2023-03-01

## 2023-03-01 NOTE — ASSESSMENT
[FreeTextEntry1] : T1DM\par Inc Tresiba 13 untis daily \par Keep Novolog6-8 tid  but take 30-45 min after eating rather than 2 hours\par  may help with reducing glycemic excursions - reviewd pt log book \par \par ?if pt misses Tresiba some  days on weekend as is not written in her book but she   thinks she is taking it  \par aide helps her to change Dexcom \par consider change to Dexcom & - will seeif insurance will cover\par \par \par \par \par memory -per Neuro all stbale\par  has aide with her 5 days per week and S.O on weekends \par \par opthalmopathy  Pt wants to drive\par I do not think this is good idea - as has memory issues and needs aide to help her out\par will see ophtalmology  and neurology to discuss \par \par Hypothyroidism- \par cont 0.125 mg  1 tab Mon thru SAt 1/2 on Sunday \par \par HLD\par -On Praluent \par -Need updated lipid panel\par \par Vit D Def\par -Updated levels due with labs\par \par    \par Graves ophthalmolpathy - off Tepezza  seems stable - to see Neuro opthalmology \par \par Vit B Def\par -Updated levels due with labs\par \par Glucose Sensor Necessity:  This patient with diabetes performs 4 or more glucose checks per day utilizing a Continuous glucose monitor.  The patient is treated with insulin via 4 injections daily )  This patient requires frequent adjustments to their insulin treatment on the basis of therapeutic continuous glucose monitoring results.  In addition, the patient has been to our office for an evaluation of their diabetes control within the past 6 months.  \par \par

## 2023-03-01 NOTE — PHYSICAL EXAM
[Alert] : alert [No Neck Mass] : no neck mass was observed [No LAD] : no lymphadenopathy [Thyroid Not Enlarged] : the thyroid was not enlarged [No Respiratory Distress] : no respiratory distress [No Accessory Muscle Use] : no accessory muscle use [Clear to Auscultation] : lungs were clear to auscultation bilaterally [Normal S1, S2] : normal S1 and S2 [Normal Rate] : heart rate was normal [Regular Rhythm] : with a regular rhythm [No Edema] : no peripheral edema [Pedal Pulses Normal] : the pedal pulses are present [No Varicosities] : there were no varicosital changes [Normal Supraclavicular Nodes] : no supraclavicular lymphadenopathy [Normal Inguinal Nodes] : no inguinal lymphadenopathy [No Rash] : no rash [Delayed in the Right Toes] : normal in the toes [Normal] : normal [Full ROM] : with full range of motion [Delayed in the Left Toes] : normal in the toes [2+] : 2+ in the dorsalis pedis [Diminished Throughout Both Feet] : normal tactile sensation with monofilament testing throughout both feet [Oriented x3] : oriented to person, place, and time [Normal Affect] : the affect was normal [Normal Mood] : the mood was normal [de-identified] : no further eyepatch

## 2023-03-01 NOTE — HISTORY OF PRESENT ILLNESS
[FreeTextEntry1] : \par Pt seen  with rober Jimenez \par \par \par Quality: T1DM\par Severity: moderate \par Duration: 48 years\par Onset: leg cramps \par Modifying Factors: insulin\par \par SMBG\par DEXCOM\par Average glucose 170 \par SD 70\par GMI 8.5%\par \par 15% Very high \par 23% High\par 58% In range \par 3% Low \par 1% Very low \par Avg Glucose 230   STd deviaiton 94 \par Current drug regimen\par Tresiba 12 unit at 2PM \par  Rober Jimenez is present  5 days per week and has S.O  on weekend to help \par Novolog  6 -8units TID  will  often take 2 hrs after emal if between meals are  <100\par  often pt is correcting wit novolog between meals \par \par \par  S.O on weekendEye exam: no longer getting tapezza- has prism lens - LV 9/2022\par Foot exam: last visit 2 weeks ago \par Kidney disease: denies \par Heart disease: denies \par \par Weight: stable\par Diet: follows a diabetic diet \par Exercise; goes to exercise daily \par Smoking: denies \par \par Graves ophthalmopathy now wearing prisms -has gotten Tapezza in the past \par Hypothyroidism due to HT \par \par Need updated TFTs\par Current regimen\par LT4 125 mcg daily at (2Am  takes with other meds \par   but now the aide tells mept  may be also on a a green thryoid pill in AM \par  takes 1/2 pill daily on empty stoamch at 630 AM ? 0.088 mg LT4\par \par \par Vit D Def\par -On MTV \par \par Vit B Def\par -On MTV \par \par Memory issue0  seen by Neurology  memory issue is ludwig

## 2023-03-01 NOTE — PHYSICAL EXAM
[Alert] : alert [No Neck Mass] : no neck mass was observed [No LAD] : no lymphadenopathy [Thyroid Not Enlarged] : the thyroid was not enlarged [No Respiratory Distress] : no respiratory distress [No Accessory Muscle Use] : no accessory muscle use [Clear to Auscultation] : lungs were clear to auscultation bilaterally [Normal S1, S2] : normal S1 and S2 [Normal Rate] : heart rate was normal [Regular Rhythm] : with a regular rhythm [No Edema] : no peripheral edema [Pedal Pulses Normal] : the pedal pulses are present [No Varicosities] : there were no varicosital changes [Normal Supraclavicular Nodes] : no supraclavicular lymphadenopathy [Normal Inguinal Nodes] : no inguinal lymphadenopathy [No Rash] : no rash [Delayed in the Right Toes] : normal in the toes [Normal] : normal [Full ROM] : with full range of motion [Delayed in the Left Toes] : normal in the toes [2+] : 2+ in the dorsalis pedis [Diminished Throughout Both Feet] : normal tactile sensation with monofilament testing throughout both feet [Oriented x3] : oriented to person, place, and time [Normal Affect] : the affect was normal [Normal Mood] : the mood was normal [de-identified] : no further eyepatch

## 2023-03-24 ENCOUNTER — NON-APPOINTMENT (OUTPATIENT)
Age: 73
End: 2023-03-24

## 2023-03-24 ENCOUNTER — APPOINTMENT (OUTPATIENT)
Dept: CARDIOLOGY | Facility: CLINIC | Age: 73
End: 2023-03-24
Payer: MEDICARE

## 2023-03-24 VITALS
BODY MASS INDEX: 24.55 KG/M2 | WEIGHT: 130 LBS | HEIGHT: 61 IN | HEART RATE: 63 BPM | SYSTOLIC BLOOD PRESSURE: 145 MMHG | OXYGEN SATURATION: 100 % | DIASTOLIC BLOOD PRESSURE: 70 MMHG

## 2023-03-24 VITALS — DIASTOLIC BLOOD PRESSURE: 55 MMHG | SYSTOLIC BLOOD PRESSURE: 120 MMHG

## 2023-03-24 PROCEDURE — 93000 ELECTROCARDIOGRAM COMPLETE: CPT

## 2023-03-24 PROCEDURE — 99214 OFFICE O/P EST MOD 30 MIN: CPT

## 2023-03-24 NOTE — PHYSICAL EXAM
[General Appearance - Well Developed] : well developed [Normal Appearance] : normal appearance [Well Groomed] : well groomed [General Appearance - Well Nourished] : well nourished [No Deformities] : no deformities [General Appearance - In No Acute Distress] : no acute distress [Normal Conjunctiva] : the conjunctiva exhibited no abnormalities [Eyelids - No Xanthelasma] : the eyelids demonstrated no xanthelasmas [Normal Oral Mucosa] : normal oral mucosa [No Oral Pallor] : no oral pallor [No Oral Cyanosis] : no oral cyanosis [Normal Jugular Venous A Waves Present] : normal jugular venous A waves present [Normal Jugular Venous V Waves Present] : normal jugular venous V waves present [No Jugular Venous Pérez A Waves] : no jugular venous pérez A waves [Respiration, Rhythm And Depth] : normal respiratory rhythm and effort [Exaggerated Use Of Accessory Muscles For Inspiration] : no accessory muscle use [Auscultation Breath Sounds / Voice Sounds] : lungs were clear to auscultation bilaterally [Abdomen Soft] : soft [Abdomen Tenderness] : non-tender [Abdomen Mass (___ Cm)] : no abdominal mass palpated [Abnormal Walk] : normal gait [Gait - Sufficient For Exercise Testing] : the gait was sufficient for exercise testing [Nail Clubbing] : no clubbing of the fingernails [Cyanosis, Localized] : no localized cyanosis [Petechial Hemorrhages (___cm)] : no petechial hemorrhages [Skin Color & Pigmentation] : normal skin color and pigmentation [] : no rash [No Venous Stasis] : no venous stasis [Skin Lesions] : no skin lesions [No Skin Ulcers] : no skin ulcer [No Xanthoma] : no  xanthoma was observed [Oriented To Time, Place, And Person] : oriented to person, place, and time [Affect] : the affect was normal [Mood] : the mood was normal [No Anxiety] : not feeling anxious [5th Left ICS - MCL] : palpated at the 5th LICS in the midclavicular line [Normal] : normal [No Precordial Heave] : no precordial heave was noted [Normal Rate] : normal [Heart Rate ___] : [unfilled] bpm [Rhythm Regular] : regular [Normal S1] : normal S1 [Normal S2] : normal S2 [No Gallop] : no gallop heard [2+] : left 2+ [No Abnormalities] : the abdominal aorta was not enlarged and no bruit was heard [Bruit] : a bruit was heard [No Pitting Edema] : no pitting edema present [Apical Thrill] : no thrill palpable at the apex [S3] : no S3 [S4] : no S4 [Click] : no click [Pericardial Rub] : no pericardial rub [I] : a grade 1 [Right Carotid Bruit] : right carotid bruit heard [Left Carotid Bruit] : no bruit heard over the left carotid [Right Femoral Bruit] : no bruit heard over the right femoral artery [Left Femoral Bruit] : no bruit heard over the left femoral artery [Rt] : no varicose veins of the right leg [Lt] : no varicose veins of the left leg

## 2023-03-24 NOTE — DISCUSSION/SUMMARY
[FreeTextEntry1] : Amanda feels well from a cardiovascular perspective.  Her major issue seems to be dementia.  Of course she has longstanding type 1 diabetes, with associated diabetic complications.  This certainly put her at risk for cardiovascular issues as well.  \par \par I reviewed her most recent blood work from January 2023, revealing a total cholesterol of 151, HDL 79, triglycerides 40, calculated LDL 64.  Echocardiography in September 2018 revealed an ejection fraction of 64%, with age-appropriate valvular calcification, and minimal mitral regurgitation.  Nuclear stress testing in August 2017 revealed no evidence of ischemia, with an exercise capacity of 13 METS.  There were less than 1 mm horizontal ST segment depressions noted, but no perfusion abnormalities.  Carotid ultrasound in February 2017 revealed mild bilateral plaque.\par  \par Given her longstanding diabetes, I have suggested nuclear stress testing to make sure that she does not have any ischemia, perhaps that she would be unable to appreciate, given her diabetic neuropathy.  She does have a soft cardiac murmur on examination, as well as a right carotid bruit.  She will schedule an echocardiogram and a carotid ultrasound.  I will be in contact with her to discuss the results.  Pending the results, she will see me in 1 year.

## 2023-03-24 NOTE — REVIEW OF SYSTEMS
[Negative] : Psychiatric [SOB] : no shortness of breath [Dyspnea on exertion] : not dyspnea during exertion [Leg Claudication] : no intermittent leg claudication [Palpitations] : no palpitations [Syncope] : no syncope [Cough] : no cough [Wheezing] : no wheezing

## 2023-03-24 NOTE — HISTORY OF PRESENT ILLNESS
[FreeTextEntry1] : Amanda Hoyt presented to the office today for a follow-up evaluation.  She was last seen in the office by Dr. Kaufman about 1 year ago.\par \par She is now 72 years old, with a history of type I diabetes with associated diabetic retinopathy, and neuropathy. Her diabetes was diagnosed as a teenager.  She has a history of hypertension and hypercholesterolemia.  She has been relatively intolerant to statin therapy and has been on Praluent.  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  She was undergoing an evaluation by neurology at that time.\par \par She presents to the office today having been feeling well.  She reports no chest discomfort or shortness of breath suggestive of angina.  She has been exercising, and she feels well with that. She denies orthopnea, PND.  She has noted lower extremity edema recently, not progressive.  She denies palpitations, dizziness and syncope.  She reports that her blood pressure and cholesterol have been well controlled.   Her memory has been worsening, and her significant other, Mateo, believes that she has some borderline dementia.\par \par She experienced leg cramping with atorvastatin, Crestor,  and pravastatin which improved with discontinuation. She does have some diabetic peripheral neuropathy by report  evaluated by Dr. Murrell of neurology.\par \par  Past medical history is remarkable for menopause at age 49, type 1 diabetes for approximately 45 years complicated by diabetic retinopathy, neuropathy,  hypertension, Graves' Disease, hyperlipidemia, Guillain-Barré syndrome at age 31, left cataract surgery, right rotator cuff surgery. \par \par She is now retired from teaching

## 2023-03-30 ENCOUNTER — APPOINTMENT (OUTPATIENT)
Dept: CARDIOLOGY | Facility: CLINIC | Age: 73
End: 2023-03-30
Payer: MEDICARE

## 2023-03-30 PROCEDURE — 93306 TTE W/DOPPLER COMPLETE: CPT

## 2023-03-30 PROCEDURE — 93880 EXTRACRANIAL BILAT STUDY: CPT

## 2023-04-03 ENCOUNTER — APPOINTMENT (OUTPATIENT)
Dept: ENDOCRINOLOGY | Facility: CLINIC | Age: 73
End: 2023-04-03
Payer: MEDICARE

## 2023-04-03 VITALS
DIASTOLIC BLOOD PRESSURE: 48 MMHG | BODY MASS INDEX: 24.55 KG/M2 | HEIGHT: 61 IN | SYSTOLIC BLOOD PRESSURE: 122 MMHG | WEIGHT: 130 LBS

## 2023-04-03 DIAGNOSIS — E53.8 DEFICIENCY OF OTHER SPECIFIED B GROUP VITAMINS: ICD-10-CM

## 2023-04-03 DIAGNOSIS — E55.9 VITAMIN D DEFICIENCY, UNSPECIFIED: ICD-10-CM

## 2023-04-03 PROCEDURE — 95251 CONT GLUC MNTR ANALYSIS I&R: CPT

## 2023-04-03 PROCEDURE — 82962 GLUCOSE BLOOD TEST: CPT

## 2023-04-03 PROCEDURE — 99214 OFFICE O/P EST MOD 30 MIN: CPT | Mod: 25

## 2023-04-03 RX ORDER — PEN NEEDLE, DIABETIC 32GX 5/32"
32G X 4 MM NEEDLE, DISPOSABLE MISCELLANEOUS
Qty: 4 | Refills: 1 | Status: ACTIVE | COMMUNITY
Start: 2021-08-04 | End: 1900-01-01

## 2023-04-03 NOTE — HISTORY OF PRESENT ILLNESS
[FreeTextEntry1] : Quality: T1DM\par Severity: moderate \par Duration: 48 years\par Onset: leg cramps \par Modifying Factors: insulin\par \par \par HgbA1C: 8.4%\par \par SMBG\par DEXCOM\par Average glucose 192\par SD 73\par GMI N/A\par \par 21% Very high \par 32%  High\par 44% In range \par 2% Low \par <1% Very low \par \par Current drug regimen\par Tresiba 14 unit at 2PM \par  Celia Atkinsid is present 5 days per week and has S.O on weekend to help \par Novolog 6 -8units TID will often take 2 hrs after emal if between meals are <100\par  often pt is correcting wit novolog between meals, not often\par \par \par Eye exam: no longer getting tapezza- has prism lens - one week ago, 3/2023\par Foot exam: went this morning \par Kidney disease: denies \par Heart disease: denies \par \par Weight: stable\par Diet: follows a diabetic diet \par Exercise; stopped classes\par Smoking: denies \par \par Graves ophthalmopathy now wearing prisms -has gotten Tapezza in the past \par Hypothyroidism due to HT \par \par Current regimen\par LT4 125 mcg daily at 6 AM on empty stomach \par TSH 2.89, \par \par Vit D Def\par -On MTV \par -Vit D 43.9\par \par Vit B Def\par -On MTV, no vit b12 levels \par \par Memory issue- seen by Neurology memory issue things have no been great lately early dementia gets upset easily \par

## 2023-04-03 NOTE — ASSESSMENT
[Diabetes Foot Care] : diabetes foot care [Long Term Vascular Complications] : long term vascular complications of diabetes [Carbohydrate Consistent Diet] : carbohydrate consistent diet [Importance of Diet and Exercise] : importance of diet and exercise to improve glycemic control, achieve weight loss and improve cardiovascular health [Exercise/Effect on Glucose] : exercise/effect on glucose [Hypoglycemia Management] : hypoglycemia management [Retinopathy Screening] : Patient was referred to ophthalmology for retinopathy screening [Levothyroxine] : The patient was instructed to take Levothyroxine on an empty stomach, separate from vitamins, and wait at least 30 minutes before eating [FreeTextEntry1] : T1DM\par -Reviewed risk/complication of uncontrolled DM \par -Increase exercise as tolerated 5 days a week x 30 mins/day, start going back to gym\par -Increase dietary efforts, low carb/low sugar\par -Continue wear CMG\par -Repeat HgbA1C prior to next appt \par -Increase TResiba to 15 units QAM, and keep novolog scale \par -follow up with optho\par \par Hypothyroidism- \par cont 0.125 mcg QD\par repeat tfts\par \par HLD\par -On Praluent \par -lipids at goal \par \par Vit D Def\par -continue MV\par \par  \par Graves ophthalmolpathy - off Tepezza seems stable - to see Neuro opthalmology \par \par Vit B Def\par -Updated levels due with labs\par \par Glucose Sensor Necessity: This patient with diabetes performs 4 or more glucose checks per day utilizing a Continuous glucose monitor. The patient is treated with insulin via 4 injections daily ) This patient requires frequent adjustments to their insulin treatment on the basis of therapeutic continuous glucose monitoring results. In addition, the patient has been to our office for an evaluation of their diabetes control within the past 6 months. \par \par RTO in 4 weeks CDE\par Has f/u appt in June scheduled with Dr. Das

## 2023-04-05 ENCOUNTER — NON-APPOINTMENT (OUTPATIENT)
Age: 73
End: 2023-04-05

## 2023-04-07 ENCOUNTER — APPOINTMENT (OUTPATIENT)
Dept: CARDIOLOGY | Facility: CLINIC | Age: 73
End: 2023-04-07
Payer: MEDICARE

## 2023-04-07 PROCEDURE — A9500: CPT

## 2023-04-07 PROCEDURE — 93015 CV STRESS TEST SUPVJ I&R: CPT

## 2023-04-07 PROCEDURE — 78452 HT MUSCLE IMAGE SPECT MULT: CPT

## 2023-05-01 ENCOUNTER — APPOINTMENT (OUTPATIENT)
Dept: ENDOCRINOLOGY | Facility: CLINIC | Age: 73
End: 2023-05-01
Payer: MEDICARE

## 2023-05-01 PROCEDURE — 97803 MED NUTRITION INDIV SUBSEQ: CPT

## 2023-05-02 ENCOUNTER — APPOINTMENT (OUTPATIENT)
Dept: ENDOCRINOLOGY | Facility: CLINIC | Age: 73
End: 2023-05-02

## 2023-05-17 ENCOUNTER — NON-APPOINTMENT (OUTPATIENT)
Age: 73
End: 2023-05-17

## 2023-06-21 NOTE — HISTORY OF PRESENT ILLNESS
[Home] : at home, [unfilled] , at the time of the visit. [Medical Office: ([facility])___] : at the medical office located in  [Verbal consent obtained from patient] : the patient, [unfilled] [FreeTextEntry1] : TEB start time 1030 AM\par  end time 1054 AM \par Pt seen  with aide Barbara \par \par \par Quality: T1DM\par Severity: moderate \par Duration: 48 years\par Onset: leg cramps \par Modifying Factors: insulin\par \par SMBG\par DEXCOM\par Average glucose 216\par SD 77\par GMI 8.5%\par \par 33% Very high \par 37% High\par 27% In range \par 2% Low \par <1% Very low \par \par Current drug regimen\par Lantus 7 units BID\par Novolog  6 units TID with meals (based off meal shes about to eat or what her current sugar is )\par Gives corrections upwards of 4-6 units at times, sometimes at night before bed (then has some middle of the night hypoglycemia) \par \par some  eryhigh readings ovnernight  then lower by 9 am\par  no one is sure if she is taking her insulin in the evening \par pt states hse is but number arehigh-\par  pt has aide 4/7 days \par  S.O on weekend\par  aide leaves now by 3-4 PM\par Eye exam: no longer getting tapezza- has prism lens - LV 9/2022\par Foot exam: last visit 2 weeks ago \par Kidney disease: denies \par Heart disease: denies \par \par Weight: stable\par Diet: follows a diabetic diet \par Exercise; goes to exercise daily \par Smoking: denies \par \par Graves ophthalmopathy now wearing prisms -has gotten Tapezza in the past \par Hypothyroidism due to HT \par \par Need updated TFTs\par Current regimen\par LT4 125 mcg daily\par Patient advised to take every day in the morning, on an empty stomach, and with no other medications. \par \par Vit D Def\par -On MTV \par \par Vit B Def\par -On MTV  19-Jun-2023 22:00

## 2023-06-28 ENCOUNTER — APPOINTMENT (OUTPATIENT)
Dept: ENDOCRINOLOGY | Facility: CLINIC | Age: 73
End: 2023-06-28
Payer: MEDICARE

## 2023-06-28 VITALS — HEIGHT: 61 IN | WEIGHT: 133 LBS | BODY MASS INDEX: 25.11 KG/M2

## 2023-06-28 VITALS — SYSTOLIC BLOOD PRESSURE: 118 MMHG | DIASTOLIC BLOOD PRESSURE: 48 MMHG | OXYGEN SATURATION: 100 % | HEART RATE: 63 BPM

## 2023-06-28 LAB — GLUCOSE BLDC GLUCOMTR-MCNC: 326

## 2023-06-28 PROCEDURE — 99214 OFFICE O/P EST MOD 30 MIN: CPT | Mod: 25

## 2023-06-28 PROCEDURE — 82962 GLUCOSE BLOOD TEST: CPT

## 2023-06-28 PROCEDURE — 95251 CONT GLUC MNTR ANALYSIS I&R: CPT

## 2023-07-03 NOTE — ASSESSMENT
[Diabetes Foot Care] : diabetes foot care [Long Term Vascular Complications] : long term vascular complications of diabetes [Carbohydrate Consistent Diet] : carbohydrate consistent diet [Importance of Diet and Exercise] : importance of diet and exercise to improve glycemic control, achieve weight loss and improve cardiovascular health [Exercise/Effect on Glucose] : exercise/effect on glucose [Hypoglycemia Management] : hypoglycemia management [Retinopathy Screening] : Patient was referred to ophthalmology for retinopathy screening [Levothyroxine] : The patient was instructed to take Levothyroxine on an empty stomach, separate from vitamins, and wait at least 30 minutes before eating [FreeTextEntry1] : T1DM\par -reviewd with pt and aide to take mealtime insulin rigth after meal\par cont tesiba\par no correctionat  bedtime- see how she does so she does not get into touble wihtlow BS after MN \par -Increase exercise as tolerated 5 days a week x 30 mins/day, start going back to gym\par -Increase dietary efforts, low carb/low sugar\par -Continue wear CMG\par -Repeat HgbA1C prior to next appt \par -\par -follow up with optho\par \par Hypothyroidism- \par cont 0.125 mcg QD\par repeat tfts\par \par HLD\par -On Praluent \par -lipids at goal \par \par Vit D Def\par -continue MV\par \par  \par Graves ophthalmolpathy - off Tepezza seems stable - to see Neuro opthalmology \par \par Vit B Def\par -Updated levels due with labs\par \par Glucose Sensor Necessity: This patient with diabetes performs 4 or more glucose checks per day utilizing a Continuous glucose monitor. The patient is treated with insulin via 4 injections daily ) This patient requires frequent adjustments to their insulin treatment on the basis of therapeutic continuous glucose monitoring results. In addition, the patient has been to our office for an evaluation of their diabetes control within the past 6 months. \par \par

## 2023-07-03 NOTE — REVIEW OF SYSTEMS
[Fatigue] : no fatigue [Recent Weight Gain (___ Lbs)] : no recent weight gain [Visual Field Defect] : no visual field defect [Recent Weight Loss (___ Lbs)] : no recent weight loss [Blurred Vision] : no blurred vision [Dysphagia] : no dysphagia [Neck Pain] : no neck pain [Chest Pain] : no chest pain [Palpitations] : no palpitations [Shortness Of Breath] : no shortness of breath [Nausea] : no nausea [Constipation] : no constipation [Vomiting] : no vomiting [Diarrhea] : no diarrhea [Polyuria] : no polyuria [Polydipsia] : no polydipsia

## 2023-07-03 NOTE — HISTORY OF PRESENT ILLNESS
[FreeTextEntry1] : Quality: T1DM\par Severity: moderate \par Duration: 48 years\par Onset: leg cramps \par Modifying Factors: insulin\par \par \par HgbA1C:7.2%\par \par SMBG\par DEXCOM\par Average glucose 192\par SD 73\par GMI N/A\par \par 21% Very high \par 32%  High\par 44% In range \par 2% Low \par <1% Very low \par \par Current drug regimen\par Tresiba 14 unit at 2PM \par  Celia Jimenez is present 5 days per week and has S.O on weekend to help \par Novolog 6 -8units TID will often take 2 hrs after emal if between meals are <100\par  often pt is taking mealtime insulin 2 hours post meals \par \par \par Eye exam: no longer getting tapezza- has prism lens - \par Foot exam: went this morning \par Kidney disease: denies \par Heart disease: denies \par \par Weight: stable\par Diet: follows a diabetic diet \par Exercise; stopped classes\par Smoking: denies \par \par Graves ophthalmopathy now wearing prisms -has gotten Tapezza in the past \par Hypothyroidism due to HT \par \par Current regimen\par LT4 125 mcg daily at 6 AM on empty stomach \par TSH 2.89, \par \par Vit D Def\par -On MTV \par -Vit D 43.9\par \par Vit B Def\par -On MTV, no vit b12 levels \par \par Memory issue- seen by Neurology memory issue things have no been great lately early dementia gets upset easily \par

## 2023-07-26 LAB
ALBUMIN SERPL ELPH-MCNC: 4 G/DL
ALP BLD-CCNC: 73 U/L
ALT SERPL-CCNC: 19 U/L
ANION GAP SERPL CALC-SCNC: 11 MMOL/L
APPEARANCE: CLEAR
AST SERPL-CCNC: 23 U/L
BILIRUB SERPL-MCNC: 0.2 MG/DL
BILIRUBIN URINE: NEGATIVE
BLOOD URINE: NEGATIVE
BUN SERPL-MCNC: 21 MG/DL
CALCIUM SERPL-MCNC: 9 MG/DL
CHLORIDE SERPL-SCNC: 105 MMOL/L
CHOLEST SERPL-MCNC: 187 MG/DL
CO2 SERPL-SCNC: 25 MMOL/L
COLOR: YELLOW
CREAT SERPL-MCNC: 0.8 MG/DL
CREAT SPEC-SCNC: 94 MG/DL
EGFR: 77 ML/MIN/1.73M2
ESTIMATED AVERAGE GLUCOSE: 180 MG/DL
GLUCOSE QUALITATIVE U: NEGATIVE MG/DL
GLUCOSE SERPL-MCNC: 87 MG/DL
HBA1C MFR BLD HPLC: 7.9 %
HDLC SERPL-MCNC: 86 MG/DL
KETONES URINE: NEGATIVE MG/DL
LDLC SERPL CALC-MCNC: 91 MG/DL
LEUKOCYTE ESTERASE URINE: NEGATIVE
MAGNESIUM SERPL-MCNC: 2.2 MG/DL
MICROALBUMIN 24H UR DL<=1MG/L-MCNC: 1.2 MG/DL
MICROALBUMIN/CREAT 24H UR-RTO: NORMAL MG/G
NITRITE URINE: NEGATIVE
NONHDLC SERPL-MCNC: 102 MG/DL
PH URINE: 6
PHOSPHATE SERPL-MCNC: 3.6 MG/DL
POTASSIUM SERPL-SCNC: 3.7 MMOL/L
PROT SERPL-MCNC: 6.3 G/DL
PROTEIN URINE: NEGATIVE MG/DL
SODIUM SERPL-SCNC: 141 MMOL/L
SPECIFIC GRAVITY URINE: 1.02
T3FREE SERPL-MCNC: 2.37 PG/ML
T4 FREE SERPL-MCNC: 1.5 NG/DL
TRIGL SERPL-MCNC: 56 MG/DL
TSH SERPL-ACNC: 1.38 UIU/ML
TSI ACT/NOR SER: 1.37 IU/L
UROBILINOGEN URINE: 0.2 MG/DL

## 2023-07-28 ENCOUNTER — NON-APPOINTMENT (OUTPATIENT)
Age: 73
End: 2023-07-28

## 2023-08-03 ENCOUNTER — APPOINTMENT (OUTPATIENT)
Dept: ENDOCRINOLOGY | Facility: CLINIC | Age: 73
End: 2023-08-03

## 2023-08-08 ENCOUNTER — APPOINTMENT (OUTPATIENT)
Dept: INTERNAL MEDICINE | Facility: CLINIC | Age: 73
End: 2023-08-08
Payer: MEDICARE

## 2023-08-08 VITALS
BODY MASS INDEX: 24.73 KG/M2 | HEIGHT: 61 IN | RESPIRATION RATE: 14 BRPM | TEMPERATURE: 97.9 F | DIASTOLIC BLOOD PRESSURE: 70 MMHG | OXYGEN SATURATION: 98 % | SYSTOLIC BLOOD PRESSURE: 122 MMHG | WEIGHT: 131 LBS | HEART RATE: 65 BPM

## 2023-08-08 DIAGNOSIS — R35.0 FREQUENCY OF MICTURITION: ICD-10-CM

## 2023-08-08 DIAGNOSIS — R35.89 FREQUENCY OF MICTURITION: ICD-10-CM

## 2023-08-08 PROCEDURE — 99213 OFFICE O/P EST LOW 20 MIN: CPT

## 2023-08-08 RX ORDER — MIRABEGRON 50 MG/1
50 TABLET, FILM COATED, EXTENDED RELEASE ORAL
Refills: 0 | Status: DISCONTINUED | COMMUNITY
End: 2023-08-08

## 2023-08-08 RX ORDER — NITROFURANTOIN (MONOHYDRATE/MACROCRYSTALS) 25; 75 MG/1; MG/1
100 CAPSULE ORAL
Qty: 14 | Refills: 0 | Status: DISCONTINUED | COMMUNITY
Start: 2022-11-01 | End: 2023-08-08

## 2023-08-08 RX ORDER — BENZONATATE 100 MG/1
100 CAPSULE ORAL
Qty: 42 | Refills: 0 | Status: DISCONTINUED | COMMUNITY
Start: 2023-01-19 | End: 2023-08-08

## 2023-08-08 RX ORDER — SOLIFENACIN SUCCINATE 5 MG/1
5 TABLET ORAL
Qty: 90 | Refills: 0 | Status: DISCONTINUED | COMMUNITY
Start: 2022-05-05 | End: 2023-08-08

## 2023-08-08 NOTE — PHYSICAL EXAM
[No Acute Distress] : no acute distress [Well Nourished] : well nourished [Well Developed] : well developed [Well-Appearing] : well-appearing [Normal Sclera/Conjunctiva] : normal sclera/conjunctiva [PERRL] : pupils equal round and reactive to light [EOMI] : extraocular movements intact [Normal Outer Ear/Nose] : the outer ears and nose were normal in appearance [Normal Oropharynx] : the oropharynx was normal [No JVD] : no jugular venous distention [No Lymphadenopathy] : no lymphadenopathy [Supple] : supple [Thyroid Normal, No Nodules] : the thyroid was normal and there were no nodules present [No Respiratory Distress] : no respiratory distress  [No Accessory Muscle Use] : no accessory muscle use [Clear to Auscultation] : lungs were clear to auscultation bilaterally [Normal Rate] : normal rate  [Regular Rhythm] : with a regular rhythm [Normal S1, S2] : normal S1 and S2 [No Murmur] : no murmur heard [No Carotid Bruits] : no carotid bruits [No Abdominal Bruit] : a ~M bruit was not heard ~T in the abdomen [Pedal Pulses Present] : the pedal pulses are present [No Edema] : there was no peripheral edema [No Extremity Clubbing/Cyanosis] : no extremity clubbing/cyanosis [Soft] : abdomen soft [Non Tender] : non-tender [Non-distended] : non-distended [No Masses] : no abdominal mass palpated [No HSM] : no HSM [Normal Bowel Sounds] : normal bowel sounds [No CVA Tenderness] : no CVA  tenderness [No Spinal Tenderness] : no spinal tenderness [No Joint Swelling] : no joint swelling [Grossly Normal Strength/Tone] : grossly normal strength/tone [No Rash] : no rash [Coordination Grossly Intact] : coordination grossly intact [No Focal Deficits] : no focal deficits [Normal Gait] : normal gait [Deep Tendon Reflexes (DTR)] : deep tendon reflexes were 2+ and symmetric [Normal Affect] : the affect was normal [Normal Insight/Judgement] : insight and judgment were intact [de-identified] : +varicose veins

## 2023-08-08 NOTE — REVIEW OF SYSTEMS
[Frequency] : frequency [Negative] : Heme/Lymph [Dysuria] : no dysuria [Incontinence] : no incontinence [Nocturia] : no nocturia [Hematuria] : no hematuria [Vaginal Discharge] : no vaginal discharge [Joint Pain] : no joint pain [Joint Stiffness] : no joint stiffness [Joint Swelling] : no joint swelling [Muscle Weakness] : no muscle weakness [Back Pain] : no back pain

## 2023-08-08 NOTE — PLAN
[FreeTextEntry1] : #Polyuria: - follows with urologist Dr. Saleh and stopped her Myrbetriq and Solifenacin last week - planning for appointment with urogyn  #T1DM - well controlled - continue tresiba 12 U daily and novolog 4-6U pre-meals - followed by endo Dr Das  # Hypothyroidism - continue Synthroid - TFTS wnl   #HTN - stable - continue with amlodipine, HCTZ, and losartan

## 2023-08-08 NOTE — HEALTH RISK ASSESSMENT
[0] : 2) Feeling down, depressed, or hopeless: Not at all (0) [PHQ-2 Negative - No further assessment needed] : PHQ-2 Negative - No further assessment needed [[id number]] : 0 [Never] : Never

## 2023-08-08 NOTE — HISTORY OF PRESENT ILLNESS
[FreeTextEntry1] : 73F presents for follow up [de-identified] : 73F PMH of  T1DM on insulin, HTN, HLD, hypothyroidism presents for follow up. Patient states shes been having polyuria for the last 2 weeks. Patient went to urologist Dr. Saleh and stopped her Myrbetriq and Solifenacin. Pt was worked up by urologist as per patient and found no signs of UTI. symptoms have not improved as of yet and has been referred to urogyn. Patient denies fever, chills, CP, SOB, dysuria, hematuria.  patient otherwise feels fine and has no other complaints.

## 2023-09-27 ENCOUNTER — APPOINTMENT (OUTPATIENT)
Dept: ENDOCRINOLOGY | Facility: CLINIC | Age: 73
End: 2023-09-27
Payer: MEDICARE

## 2023-09-27 VITALS
DIASTOLIC BLOOD PRESSURE: 54 MMHG | BODY MASS INDEX: 23.98 KG/M2 | HEIGHT: 61 IN | WEIGHT: 127 LBS | SYSTOLIC BLOOD PRESSURE: 100 MMHG

## 2023-09-27 PROCEDURE — 95251 CONT GLUC MNTR ANALYSIS I&R: CPT

## 2023-09-27 PROCEDURE — 99214 OFFICE O/P EST MOD 30 MIN: CPT | Mod: 25

## 2023-10-06 ENCOUNTER — NON-APPOINTMENT (OUTPATIENT)
Age: 73
End: 2023-10-06

## 2023-10-09 ENCOUNTER — APPOINTMENT (OUTPATIENT)
Dept: INTERNAL MEDICINE | Facility: CLINIC | Age: 73
End: 2023-10-09
Payer: MEDICARE

## 2023-10-09 VITALS
HEART RATE: 65 BPM | HEIGHT: 61 IN | DIASTOLIC BLOOD PRESSURE: 60 MMHG | SYSTOLIC BLOOD PRESSURE: 120 MMHG | BODY MASS INDEX: 24.17 KG/M2 | WEIGHT: 128 LBS | RESPIRATION RATE: 14 BRPM | TEMPERATURE: 98.1 F | OXYGEN SATURATION: 97 %

## 2023-10-09 DIAGNOSIS — H81.10 BENIGN PAROXYSMAL VERTIGO, UNSPECIFIED EAR: ICD-10-CM

## 2023-10-09 PROCEDURE — 99213 OFFICE O/P EST LOW 20 MIN: CPT

## 2023-10-17 ENCOUNTER — LABORATORY RESULT (OUTPATIENT)
Age: 73
End: 2023-10-17

## 2023-11-01 ENCOUNTER — NON-APPOINTMENT (OUTPATIENT)
Age: 73
End: 2023-11-01

## 2023-12-27 ENCOUNTER — APPOINTMENT (OUTPATIENT)
Dept: INTERNAL MEDICINE | Facility: CLINIC | Age: 73
End: 2023-12-27
Payer: MEDICARE

## 2023-12-27 VITALS
DIASTOLIC BLOOD PRESSURE: 50 MMHG | BODY MASS INDEX: 23.6 KG/M2 | OXYGEN SATURATION: 98 % | WEIGHT: 125 LBS | RESPIRATION RATE: 14 BRPM | SYSTOLIC BLOOD PRESSURE: 120 MMHG | HEART RATE: 73 BPM | HEIGHT: 61 IN

## 2023-12-27 PROCEDURE — 99214 OFFICE O/P EST MOD 30 MIN: CPT

## 2023-12-27 NOTE — END OF VISIT
[FreeTextEntry3] : "I, David Logan, personally scribed the services dictated to me by Dr. Chaitanya Acharya MD in this documentation on 12/27/2023"   "I Dr. Chaitanya Acharya MD, personally performed the services described in this documentation on 12/27/2023 for the patient as scribed by David Logan in my presence. I have reviewed and verified that all the information is accurate and true."

## 2023-12-27 NOTE — HEALTH RISK ASSESSMENT
[Never (0 pts)] : Never (0 points) [No] : In the past 12 months have you used drugs other than those required for medical reasons? No [No falls in past year] : Patient reported no falls in the past year [Little interest or pleasure doing things] : 1) Little interest or pleasure doing things [Feeling down, depressed, or hopeless] : 2) Feeling down, depressed, or hopeless [0] : 2) Feeling down, depressed, or hopeless: Not at all (0) [PHQ-2 Negative - No further assessment needed] : PHQ-2 Negative - No further assessment needed [Never] : Never [[id number]] : 0

## 2023-12-27 NOTE — HISTORY OF PRESENT ILLNESS
[Formal Caregiver] : formal caregiver [FreeTextEntry8] :  ARIS CENTENO is a 73 year old F who presents today for an acute visit complaining of a scratch and blisters on her right foot. Pt has a hx of hypothyroidism, HLD, HTN and DM. Pt reports experiencing some pain in the foot. Pt currently only has 1 injection of Praluent for her cholesterol

## 2023-12-27 NOTE — PLAN
[FreeTextEntry1] : continue medications Amlodipine HCTZ Losartan  Sent to wound care Follow up with cardiologist try Atorvastatin

## 2023-12-27 NOTE — PHYSICAL EXAM
[No Acute Distress] : no acute distress [Well Nourished] : well nourished [Well Developed] : well developed [Well-Appearing] : well-appearing [Normal Voice/Communication] : normal voice/communication [Normal Sclera/Conjunctiva] : normal sclera/conjunctiva [PERRL] : pupils equal round and reactive to light [EOMI] : extraocular movements intact [Normal Outer Ear/Nose] : the outer ears and nose were normal in appearance [Normal Oropharynx] : the oropharynx was normal [Normal TMs] : both tympanic membranes were normal [No JVD] : no jugular venous distention [No Lymphadenopathy] : no lymphadenopathy [Supple] : supple [Thyroid Normal, No Nodules] : the thyroid was normal and there were no nodules present [No Respiratory Distress] : no respiratory distress  [No Accessory Muscle Use] : no accessory muscle use [Clear to Auscultation] : lungs were clear to auscultation bilaterally [Normal Rate] : normal rate  [Regular Rhythm] : with a regular rhythm [Normal S1, S2] : normal S1 and S2 [No Murmur] : no murmur heard [No Carotid Bruits] : no carotid bruits [No Abdominal Bruit] : a ~M bruit was not heard ~T in the abdomen [No Varicosities] : no varicosities [Pedal Pulses Present] : the pedal pulses are present [No Edema] : there was no peripheral edema [No Palpable Aorta] : no palpable aorta [No Extremity Clubbing/Cyanosis] : no extremity clubbing/cyanosis [Soft] : abdomen soft [Non Tender] : non-tender [Non-distended] : non-distended [No Masses] : no abdominal mass palpated [No HSM] : no HSM [Normal Bowel Sounds] : normal bowel sounds [Normal Supraclavicular Nodes] : no supraclavicular lymphadenopathy [Normal Posterior Cervical Nodes] : no posterior cervical lymphadenopathy [Normal Anterior Cervical Nodes] : no anterior cervical lymphadenopathy [No CVA Tenderness] : no CVA  tenderness [No Spinal Tenderness] : no spinal tenderness [No Joint Swelling] : no joint swelling [Grossly Normal Strength/Tone] : grossly normal strength/tone [No Rash] : no rash [Coordination Grossly Intact] : coordination grossly intact [No Focal Deficits] : no focal deficits [Normal Gait] : normal gait [Deep Tendon Reflexes (DTR)] : deep tendon reflexes were 2+ and symmetric [Speech Grossly Normal] : speech grossly normal [Memory Grossly Normal] : memory grossly normal [Normal Affect] : the affect was normal [Alert and Oriented x3] : oriented to person, place, and time [Normal Mood] : the mood was normal [Normal Insight/Judgement] : insight and judgment were intact [de-identified] : blisters and fungus on right foot

## 2024-01-01 ENCOUNTER — NON-APPOINTMENT (OUTPATIENT)
Age: 74
End: 2024-01-01

## 2024-01-02 ENCOUNTER — RESULT REVIEW (OUTPATIENT)
Age: 74
End: 2024-01-02

## 2024-01-02 ENCOUNTER — OUTPATIENT (OUTPATIENT)
Dept: OUTPATIENT SERVICES | Facility: HOSPITAL | Age: 74
LOS: 1 days | Discharge: HOME | End: 2024-01-02
Payer: MEDICARE

## 2024-01-02 ENCOUNTER — APPOINTMENT (OUTPATIENT)
Dept: WOUND CARE | Facility: HOSPITAL | Age: 74
End: 2024-01-02
Payer: MEDICARE

## 2024-01-02 VITALS
WEIGHT: 120 LBS | HEART RATE: 63 BPM | RESPIRATION RATE: 20 BRPM | HEIGHT: 61 IN | BODY MASS INDEX: 22.66 KG/M2 | DIASTOLIC BLOOD PRESSURE: 64 MMHG | OXYGEN SATURATION: 98 % | SYSTOLIC BLOOD PRESSURE: 120 MMHG | TEMPERATURE: 97.8 F

## 2024-01-02 DIAGNOSIS — Z98.890 OTHER SPECIFIED POSTPROCEDURAL STATES: Chronic | ICD-10-CM

## 2024-01-02 DIAGNOSIS — L03.032 CELLULITIS OF LEFT TOE: ICD-10-CM

## 2024-01-02 DIAGNOSIS — S91.309A UNSPECIFIED OPEN WOUND, UNSPECIFIED FOOT, INITIAL ENCOUNTER: ICD-10-CM

## 2024-01-02 PROCEDURE — 73630 X-RAY EXAM OF FOOT: CPT | Mod: 26,50

## 2024-01-02 PROCEDURE — G0463: CPT

## 2024-01-02 PROCEDURE — 73630 X-RAY EXAM OF FOOT: CPT

## 2024-01-02 PROCEDURE — 99203 OFFICE O/P NEW LOW 30 MIN: CPT

## 2024-01-02 NOTE — PLAN
[FreeTextEntry1] : patient sent for X rays of the feet , r/o osteo of the left 2nd toe . Patient also sent for vascular studies .Patient to apply mupirocin to the 2nd toe left and continue observation Spent 30 minutes for patient care and medical decision making. Patient was told if there are signs of infection ( fever, chills, nausea, vomiting ) or changes in the condition of the wound ( pain , cellulitis , purulent drainage or odor ) they should proceed to the ER as soon as possible

## 2024-01-02 NOTE — HISTORY OF PRESENT ILLNESS
[FreeTextEntry1] : Pt is a 73 Yr old type 1 diabetic female w/ a Hx of hypothyroidism, HLD, and HTN presenting with a right plantar foot fissure and left foot 2nd digit lateral nail border area of dry eschar. The areas of concern have been present for approximately 2 weeks. Pts recent Hga1c is 7.8% as of 10/17/23. Pt was referred by Dr. Acharya, the right plantar foot has been covered with a dry sterile dressing, pt is accompanied by a HHA primarily for daily glucose monitoring

## 2024-01-02 NOTE — REVIEW OF SYSTEMS
[Joint Stiffness] : joint stiffness [Negative] : Genitourinary [Fever] : no fever [Chills] : no chills [Eye Pain] : no eye pain [Loss Of Hearing] : no hearing loss [Shortness Of Breath] : no shortness of breath [Abdominal Pain] : no abdominal pain [Anxiety] : no anxiety [Easy Bleeding] : no tendency for easy bleeding [FreeTextEntry5] : HTN, HLD  [de-identified] : Dry skin bilateral , absence of hair growth , inactive paronychia of the left lateral 2nd toe , no drainage , , some old eschar and minimal erythma  [de-identified] : IDDM with neuropathy  [de-identified] : IDDM , type 1

## 2024-01-02 NOTE — PHYSICAL EXAM
[0] : left 0 [1+] : left 1+ [Ankle Swelling (On Exam)] : present [Ankle Swelling Bilaterally] : bilaterally  [Ankle Swelling On The Right] : mild [Varicose Veins Of Lower Extremities] : bilaterally [Ankle Swelling On The Left] : moderate [No Rash or Lesion] : No rash or lesion [Alert] : alert [Oriented to Person] : oriented to person [Oriented to Place] : oriented to place [Oriented to Time] : oriented to time [Calm] : calm [4 x 4] : 4 x 4  [] : not present [Purpura] : no purpura  [Petechiae] : no petechiae [Skin Ulcer] : no ulcer [Skin Induration] : no induration [de-identified] : calm [de-identified] : inactive paronychia of the left lateral 2nd toe  [de-identified] : HTN, HLD [de-identified] : IDDM with neuropathy  [FreeTextEntry1] : right plantar foot- re epithelialized fissure  [FreeTextEntry2] : 0.1 [FreeTextEntry3] : 1.1 [FreeTextEntry4] : <0.1 [de-identified] : mupirocin [de-identified] : Mechanically cleansed with Sterile gauze and 0.9% Normal Saline  kerlix [FreeTextEntry7] : left foot 2nd digit lateral nail border- dry eschar lifted by MD, area w/ fragile epithelial tissue [FreeTextEntry8] : 0.2 [FreeTextEntry9] : 0.2 [de-identified] : 0.1 [de-identified] : intact [de-identified] : mupirocin [de-identified] : Mechanically cleansed with Sterile gauze and 0.9% Normal Saline  toe sock [de-identified] : R DP: +2 regular R PT: unpalpable due to edema, audible via doppler biphasic waveforms  L DP: +1 regular R PT: unpalpable due to edema, audible via doppler Biphasic waveforms  Vascular studies ordered by DPM  cap refill <3 secs  skin: Normal. intact, warm to touch  [TWNoteComboBox4] : None [TWNoteComboBox5] : No [TWNoteComboBox6] : Diabetic [de-identified] : No [de-identified] : Normal [de-identified] : None [de-identified] : None [de-identified] : None [de-identified] : No [de-identified] : Daily [de-identified] : None [de-identified] : Primary Dressing [de-identified] : No [de-identified] : Diabetic [de-identified] : No [de-identified] : other [de-identified] : None [de-identified] : None [de-identified] : None [de-identified] : No [de-identified] : Primary Dressing [de-identified] : Daily

## 2024-01-02 NOTE — VITALS
[] : Yes [de-identified] : 0/10 but right plantar foot is tender to touch [FreeTextEntry5] : initial visit, medications reconciled

## 2024-01-03 ENCOUNTER — TRANSCRIPTION ENCOUNTER (OUTPATIENT)
Age: 74
End: 2024-01-03

## 2024-01-03 DIAGNOSIS — Z82.49 FAMILY HISTORY OF ISCHEMIC HEART DISEASE AND OTHER DISEASES OF THE CIRCULATORY SYSTEM: ICD-10-CM

## 2024-01-03 DIAGNOSIS — E03.9 HYPOTHYROIDISM, UNSPECIFIED: ICD-10-CM

## 2024-01-03 DIAGNOSIS — Z98.890 OTHER SPECIFIED POSTPROCEDURAL STATES: ICD-10-CM

## 2024-01-03 DIAGNOSIS — Z79.4 LONG TERM (CURRENT) USE OF INSULIN: ICD-10-CM

## 2024-01-03 DIAGNOSIS — Z98.49 CATARACT EXTRACTION STATUS, UNSPECIFIED EYE: ICD-10-CM

## 2024-01-03 DIAGNOSIS — E05.00 THYROTOXICOSIS WITH DIFFUSE GOITER WITHOUT THYROTOXIC CRISIS OR STORM: ICD-10-CM

## 2024-01-03 DIAGNOSIS — R23.4 CHANGES IN SKIN TEXTURE: ICD-10-CM

## 2024-01-03 DIAGNOSIS — E10.40 TYPE 1 DIABETES MELLITUS WITH DIABETIC NEUROPATHY, UNSPECIFIED: ICD-10-CM

## 2024-01-03 DIAGNOSIS — Z79.890 HORMONE REPLACEMENT THERAPY: ICD-10-CM

## 2024-01-03 DIAGNOSIS — S62.609A FRACTURE OF UNSPECIFIED PHALANX OF UNSPECIFIED FINGER, INITIAL ENCOUNTER FOR CLOSED FRACTURE: ICD-10-CM

## 2024-01-03 DIAGNOSIS — F32.5 MAJOR DEPRESSIVE DISORDER, SINGLE EPISODE, IN FULL REMISSION: ICD-10-CM

## 2024-01-03 DIAGNOSIS — Z79.899 OTHER LONG TERM (CURRENT) DRUG THERAPY: ICD-10-CM

## 2024-01-03 DIAGNOSIS — D64.9 ANEMIA, UNSPECIFIED: ICD-10-CM

## 2024-01-03 DIAGNOSIS — E78.5 HYPERLIPIDEMIA, UNSPECIFIED: ICD-10-CM

## 2024-01-03 DIAGNOSIS — I10 ESSENTIAL (PRIMARY) HYPERTENSION: ICD-10-CM

## 2024-01-03 DIAGNOSIS — Z87.440 PERSONAL HISTORY OF URINARY (TRACT) INFECTIONS: ICD-10-CM

## 2024-01-03 DIAGNOSIS — E55.9 VITAMIN D DEFICIENCY, UNSPECIFIED: ICD-10-CM

## 2024-01-03 DIAGNOSIS — Z79.82 LONG TERM (CURRENT) USE OF ASPIRIN: ICD-10-CM

## 2024-01-03 RX ORDER — HYDROCHLOROTHIAZIDE 12.5 MG/1
12.5 CAPSULE ORAL
Qty: 90 | Refills: 0 | Status: ACTIVE | COMMUNITY
Start: 2020-02-04 | End: 1900-01-01

## 2024-01-07 ENCOUNTER — TRANSCRIPTION ENCOUNTER (OUTPATIENT)
Age: 74
End: 2024-01-07

## 2024-01-12 ENCOUNTER — OUTPATIENT (OUTPATIENT)
Dept: OUTPATIENT SERVICES | Facility: HOSPITAL | Age: 74
LOS: 1 days | End: 2024-01-12
Payer: MEDICARE

## 2024-01-12 ENCOUNTER — RESULT REVIEW (OUTPATIENT)
Age: 74
End: 2024-01-12

## 2024-01-12 DIAGNOSIS — Z98.890 OTHER SPECIFIED POSTPROCEDURAL STATES: Chronic | ICD-10-CM

## 2024-01-12 DIAGNOSIS — L03.032 CELLULITIS OF LEFT TOE: ICD-10-CM

## 2024-01-12 PROCEDURE — 93923 UPR/LXTR ART STDY 3+ LVLS: CPT

## 2024-01-12 PROCEDURE — 93923 UPR/LXTR ART STDY 3+ LVLS: CPT | Mod: 26

## 2024-01-15 ENCOUNTER — NON-APPOINTMENT (OUTPATIENT)
Age: 74
End: 2024-01-15

## 2024-01-15 ENCOUNTER — APPOINTMENT (OUTPATIENT)
Dept: INTERNAL MEDICINE | Facility: CLINIC | Age: 74
End: 2024-01-15
Payer: MEDICARE

## 2024-01-15 VITALS
HEIGHT: 61 IN | HEART RATE: 70 BPM | TEMPERATURE: 98.3 F | WEIGHT: 130.01 LBS | SYSTOLIC BLOOD PRESSURE: 118 MMHG | OXYGEN SATURATION: 98 % | BODY MASS INDEX: 24.55 KG/M2 | RESPIRATION RATE: 18 BRPM | DIASTOLIC BLOOD PRESSURE: 60 MMHG

## 2024-01-15 PROCEDURE — G0439: CPT

## 2024-01-15 PROCEDURE — 93000 ELECTROCARDIOGRAM COMPLETE: CPT

## 2024-01-15 RX ORDER — COVID-19 MOLECULAR TEST ASSAY
KIT MISCELLANEOUS
Qty: 2 | Refills: 0 | Status: DISCONTINUED | COMMUNITY
Start: 2022-05-23 | End: 2024-01-15

## 2024-01-15 RX ORDER — MELOXICAM 10 MG/1
10 CAPSULE ORAL DAILY
Qty: 30 | Refills: 0 | Status: DISCONTINUED | COMMUNITY
Start: 2024-01-03 | End: 2024-01-15

## 2024-01-15 RX ORDER — ESTRADIOL 0.1 MG/G
0.1 CREAM VAGINAL
Refills: 0 | Status: ACTIVE | COMMUNITY

## 2024-01-15 RX ORDER — MECLIZINE HYDROCHLORIDE 25 MG/1
25 TABLET ORAL
Qty: 42 | Refills: 0 | Status: DISCONTINUED | COMMUNITY
Start: 2023-10-09 | End: 2024-01-15

## 2024-01-15 RX ORDER — ESTRADIOL 0.1 MG/G
0.1 CREAM VAGINAL
Qty: 42 | Refills: 0 | Status: DISCONTINUED | COMMUNITY
Start: 2021-12-22 | End: 2024-01-15

## 2024-01-15 NOTE — PHYSICAL EXAM
[No Acute Distress] : no acute distress [Normal Voice/Communication] : normal voice/communication [Normal Sclera/Conjunctiva] : normal sclera/conjunctiva [EOMI] : extraocular movements intact [Normal Oropharynx] : the oropharynx was normal [No Lymphadenopathy] : no lymphadenopathy [Supple] : supple [Thyroid Normal, No Nodules] : the thyroid was normal and there were no nodules present [No Respiratory Distress] : no respiratory distress  [No Accessory Muscle Use] : no accessory muscle use [Clear to Auscultation] : lungs were clear to auscultation bilaterally [Normal Rate] : normal rate  [Regular Rhythm] : with a regular rhythm [Normal S1, S2] : normal S1 and S2 [Soft] : abdomen soft [Non Tender] : non-tender [Non-distended] : non-distended [No Masses] : no abdominal mass palpated [No Joint Swelling] : no joint swelling [Grossly Normal Strength/Tone] : grossly normal strength/tone [No Focal Deficits] : no focal deficits [Alert and Oriented x3] : oriented to person, place, and time [de-identified] : (+) murmur LUSB [de-identified] : b/l hearing aids present  [de-identified] : 2+ pretibial pitting edema, b/l, small varicosities present

## 2024-01-15 NOTE — HISTORY OF PRESENT ILLNESS
[de-identified] : Pt here for CPE. She is feeling well, no acute complaints.   DEXA in 2022 at [facility]

## 2024-01-15 NOTE — PLAN
[FreeTextEntry1] : HCM: -check labs -EKG unchanged  -UTD with mammo -due for DEXA this year -due for colonoscopy this year  -UTD with flu shot  -recommend RSV vaccine at pharmacy  T1DM: f/u a1c, follows with endo, continue tresiba, novolog, pt is on statin and ARB, recommend diabetic eye  exam  HTN: stable, continue losartan, amlodipine, HCTZ Hypothyroidism: check TFTs, continue levothyroxine  Memory impairment: follows with neuro-Dr. Copeland  OAB: as per pt, working with pelvic floor PT  HLD: on praluent and statin, check lipids  chronic LE edema: recommend leg elevation and compression stockings, avoid increasing diuretic dose as pt's BP may drop, echo April 2023 LVEF 60-65%

## 2024-01-15 NOTE — HEALTH RISK ASSESSMENT
[0] : 2) Feeling down, depressed, or hopeless: Not at all (0) [PHQ-2 Negative - No further assessment needed] : PHQ-2 Negative - No further assessment needed [[id number]] : 0 [Patient reported colonoscopy was normal] : Patient reported colonoscopy was normal [ColonoscopyDate] : 01/19

## 2024-01-16 ENCOUNTER — APPOINTMENT (OUTPATIENT)
Dept: WOUND CARE | Facility: HOSPITAL | Age: 74
End: 2024-01-16
Payer: MEDICARE

## 2024-01-16 ENCOUNTER — OUTPATIENT (OUTPATIENT)
Dept: OUTPATIENT SERVICES | Facility: HOSPITAL | Age: 74
LOS: 1 days | Discharge: HOME | End: 2024-01-16
Payer: MEDICARE

## 2024-01-16 VITALS
HEART RATE: 58 BPM | BODY MASS INDEX: 24.55 KG/M2 | OXYGEN SATURATION: 98 % | DIASTOLIC BLOOD PRESSURE: 54 MMHG | HEIGHT: 61 IN | RESPIRATION RATE: 20 BRPM | SYSTOLIC BLOOD PRESSURE: 118 MMHG | TEMPERATURE: 98.3 F | WEIGHT: 130 LBS

## 2024-01-16 DIAGNOSIS — Z00.00 ENCOUNTER FOR GENERAL ADULT MEDICAL EXAMINATION W/OUT ABNORMAL FINDINGS: ICD-10-CM

## 2024-01-16 DIAGNOSIS — R23.4 CHANGES IN SKIN TEXTURE: ICD-10-CM

## 2024-01-16 DIAGNOSIS — Z98.890 OTHER SPECIFIED POSTPROCEDURAL STATES: Chronic | ICD-10-CM

## 2024-01-16 PROCEDURE — 99213 OFFICE O/P EST LOW 20 MIN: CPT

## 2024-01-16 PROCEDURE — G0463: CPT

## 2024-01-16 RX ORDER — MUPIROCIN 20 MG/G
2 OINTMENT TOPICAL TWICE DAILY
Qty: 1 | Refills: 5 | Status: ACTIVE | COMMUNITY
Start: 2024-01-16 | End: 1900-01-01

## 2024-01-16 NOTE — ASSESSMENT
[FreeTextEntry2] : infection prevention promote skin integrity vascular testing encourage glycemic control daily foot checks  [FreeTextEntry4] : F/U 2 weeks Left Foot xray completed, viewed by DPM, and discussed with pt Vascular studies completed, pt to have studies interpreted by MD on 1/18/24 Pt advised to purchase BAG BALM to apply to the dry skin on her feet daily, to be done at bedtime and covered with white socks, pt and caregiver verbalized understanding. B/W was done on 1/15/24, results to be reviewed by this pts PCP

## 2024-01-16 NOTE — REVIEW OF SYSTEMS
[Joint Stiffness] : joint stiffness [Negative] : Genitourinary [Fever] : no fever [Chills] : no chills [Eye Pain] : no eye pain [Loss Of Hearing] : no hearing loss [Shortness Of Breath] : no shortness of breath [Abdominal Pain] : no abdominal pain [Anxiety] : no anxiety [Easy Bleeding] : no tendency for easy bleeding [FreeTextEntry5] : HTN, HLD  [de-identified] : Dry skin bilateral , absence of hair growth , inactive paronychia of the left lateral 2nd toe , no drainage , , some old eschar and minimal erythma  [de-identified] : IDDM with neuropathy  [de-identified] : IDDM , type 1

## 2024-01-16 NOTE — PHYSICAL EXAM
[0] : left 0 [1+] : left 1+ [Ankle Swelling (On Exam)] : present [Ankle Swelling Bilaterally] : bilaterally  [Ankle Swelling On The Right] : mild [Varicose Veins Of Lower Extremities] : bilaterally [Ankle Swelling On The Left] : moderate [No Rash or Lesion] : No rash or lesion [Alert] : alert [Oriented to Person] : oriented to person [Oriented to Place] : oriented to place [Oriented to Time] : oriented to time [Calm] : calm [] : not present [Purpura] : no purpura  [Petechiae] : no petechiae [Skin Ulcer] : no ulcer [Skin Induration] : no induration [de-identified] : calm [de-identified] : inactive paronychia of the left lateral 2nd toe  [de-identified] : IDDM with neuropathy  [FreeTextEntry1] : right plantar foot- resolved [de-identified] : none [de-identified] : Mechanically cleansed with Sterile gauze and 0.9% Normal Saline  moisturize skin daily [FreeTextEntry7] : left foot 2nd digit lateral nail border- resolved [de-identified] : intact [de-identified] : none [de-identified] : Mechanically cleansed with Sterile gauze and 0.9% Normal Saline  toe sock [de-identified] : left lateral heel- fissure (NEW) [de-identified] : 0.2 [de-identified] : 0.1 [de-identified] : 0.1 [de-identified] : fissure [de-identified] : dry skin [de-identified] : mupirocin  [de-identified] : Mechanically cleansed with Sterile gauze and 0.9% Normal Saline [de-identified] : R DP: +2 regular R PT: unpalpable due to edema, audible via doppler biphasic waveforms  L DP: +1 regular R PT: unpalpable due to edema, audible via doppler Biphasic waveforms  Vascular studies ordered by DPM  cap refill <3 secs  skin: Normal. intact, warm to touch  [TWNoteComboBox4] : None [TWNoteComboBox5] : No [TWNoteComboBox6] : Diabetic [de-identified] : No [de-identified] : Normal [de-identified] : None [de-identified] : None [de-identified] : None [de-identified] : No [de-identified] : Daily [de-identified] : Primary Dressing [de-identified] : None [de-identified] : No [de-identified] : Diabetic [de-identified] : No [de-identified] : other [de-identified] : None [de-identified] : None [de-identified] : None [de-identified] : No [de-identified] : Daily [de-identified] : Primary Dressing [de-identified] : None [de-identified] : No [de-identified] : Other [de-identified] : No [de-identified] : other [de-identified] : None [de-identified] : None [de-identified] : None [de-identified] : No [de-identified] : Daily [de-identified] : Primary Dressing

## 2024-01-16 NOTE — PLAN
[FreeTextEntry1] : Patient to continue use of mupirocin and patient to have vascular consult latter this week . PTR 2 weeks  Spent 20 minutes for patient care and medical decision making.  Patient was told if there are signs of infection ( fever, chills, nausea, vomiting ) or changes in the condition of the wound ( pain , cellulitis , purulent drainage or odor ) they should proceed to the ER as soon as possible

## 2024-01-16 NOTE — HISTORY OF PRESENT ILLNESS
[FreeTextEntry1] : Patient has areas of eschar and fissures of both feet , x rays with calcifications and vascular studies were reviewed with the patient and her aide , no soi , no open ulcers

## 2024-01-17 ENCOUNTER — APPOINTMENT (OUTPATIENT)
Dept: ENDOCRINOLOGY | Facility: CLINIC | Age: 74
End: 2024-01-17
Payer: MEDICARE

## 2024-01-17 VITALS
OXYGEN SATURATION: 97 % | HEART RATE: 69 BPM | WEIGHT: 129 LBS | SYSTOLIC BLOOD PRESSURE: 116 MMHG | BODY MASS INDEX: 24.35 KG/M2 | HEIGHT: 61 IN | DIASTOLIC BLOOD PRESSURE: 60 MMHG

## 2024-01-17 DIAGNOSIS — I10 ESSENTIAL (PRIMARY) HYPERTENSION: ICD-10-CM

## 2024-01-17 DIAGNOSIS — E10.40 TYPE 1 DIABETES MELLITUS WITH DIABETIC NEUROPATHY, UNSPECIFIED: ICD-10-CM

## 2024-01-17 DIAGNOSIS — Z79.4 LONG TERM (CURRENT) USE OF INSULIN: ICD-10-CM

## 2024-01-17 DIAGNOSIS — F32.5 MAJOR DEPRESSIVE DISORDER, SINGLE EPISODE, IN FULL REMISSION: ICD-10-CM

## 2024-01-17 DIAGNOSIS — Z98.49 CATARACT EXTRACTION STATUS, UNSPECIFIED EYE: ICD-10-CM

## 2024-01-17 DIAGNOSIS — E10.65 TYPE 1 DIABETES MELLITUS WITH HYPERGLYCEMIA: ICD-10-CM

## 2024-01-17 DIAGNOSIS — Z87.440 PERSONAL HISTORY OF URINARY (TRACT) INFECTIONS: ICD-10-CM

## 2024-01-17 DIAGNOSIS — Z98.890 OTHER SPECIFIED POSTPROCEDURAL STATES: ICD-10-CM

## 2024-01-17 DIAGNOSIS — R23.4 CHANGES IN SKIN TEXTURE: ICD-10-CM

## 2024-01-17 DIAGNOSIS — E78.5 HYPERLIPIDEMIA, UNSPECIFIED: ICD-10-CM

## 2024-01-17 DIAGNOSIS — Z79.82 LONG TERM (CURRENT) USE OF ASPIRIN: ICD-10-CM

## 2024-01-17 DIAGNOSIS — Z79.890 HORMONE REPLACEMENT THERAPY: ICD-10-CM

## 2024-01-17 DIAGNOSIS — D64.9 ANEMIA, UNSPECIFIED: ICD-10-CM

## 2024-01-17 DIAGNOSIS — Z82.49 FAMILY HISTORY OF ISCHEMIC HEART DISEASE AND OTHER DISEASES OF THE CIRCULATORY SYSTEM: ICD-10-CM

## 2024-01-17 DIAGNOSIS — Z79.899 OTHER LONG TERM (CURRENT) DRUG THERAPY: ICD-10-CM

## 2024-01-17 DIAGNOSIS — E03.9 HYPOTHYROIDISM, UNSPECIFIED: ICD-10-CM

## 2024-01-17 DIAGNOSIS — E05.00 THYROTOXICOSIS WITH DIFFUSE GOITER WITHOUT THYROTOXIC CRISIS OR STORM: ICD-10-CM

## 2024-01-17 DIAGNOSIS — E55.9 VITAMIN D DEFICIENCY, UNSPECIFIED: ICD-10-CM

## 2024-01-17 PROCEDURE — G2211 COMPLEX E/M VISIT ADD ON: CPT

## 2024-01-17 PROCEDURE — 99214 OFFICE O/P EST MOD 30 MIN: CPT

## 2024-01-18 ENCOUNTER — OUTPATIENT (OUTPATIENT)
Dept: OUTPATIENT SERVICES | Facility: HOSPITAL | Age: 74
LOS: 1 days | Discharge: HOME | End: 2024-01-18
Payer: MEDICARE

## 2024-01-18 ENCOUNTER — APPOINTMENT (OUTPATIENT)
Dept: WOUND CARE | Facility: HOSPITAL | Age: 74
End: 2024-01-18
Payer: MEDICARE

## 2024-01-18 VITALS
HEIGHT: 61 IN | BODY MASS INDEX: 24.35 KG/M2 | HEART RATE: 18 BPM | DIASTOLIC BLOOD PRESSURE: 63 MMHG | TEMPERATURE: 98.6 F | RESPIRATION RATE: 20 BRPM | WEIGHT: 129 LBS | OXYGEN SATURATION: 98 % | SYSTOLIC BLOOD PRESSURE: 129 MMHG

## 2024-01-18 DIAGNOSIS — Z98.49 CATARACT EXTRACTION STATUS, UNSPECIFIED EYE: ICD-10-CM

## 2024-01-18 DIAGNOSIS — E10.40 TYPE 1 DIABETES MELLITUS WITH DIABETIC NEUROPATHY, UNSPECIFIED: ICD-10-CM

## 2024-01-18 DIAGNOSIS — E05.00 THYROTOXICOSIS WITH DIFFUSE GOITER WITHOUT THYROTOXIC CRISIS OR STORM: ICD-10-CM

## 2024-01-18 DIAGNOSIS — Z87.440 PERSONAL HISTORY OF URINARY (TRACT) INFECTIONS: ICD-10-CM

## 2024-01-18 DIAGNOSIS — Z79.4 LONG TERM (CURRENT) USE OF INSULIN: ICD-10-CM

## 2024-01-18 DIAGNOSIS — E78.5 HYPERLIPIDEMIA, UNSPECIFIED: ICD-10-CM

## 2024-01-18 DIAGNOSIS — Z79.899 OTHER LONG TERM (CURRENT) DRUG THERAPY: ICD-10-CM

## 2024-01-18 DIAGNOSIS — Z82.49 FAMILY HISTORY OF ISCHEMIC HEART DISEASE AND OTHER DISEASES OF THE CIRCULATORY SYSTEM: ICD-10-CM

## 2024-01-18 DIAGNOSIS — Z98.890 OTHER SPECIFIED POSTPROCEDURAL STATES: Chronic | ICD-10-CM

## 2024-01-18 DIAGNOSIS — Z98.890 OTHER SPECIFIED POSTPROCEDURAL STATES: ICD-10-CM

## 2024-01-18 DIAGNOSIS — I10 ESSENTIAL (PRIMARY) HYPERTENSION: ICD-10-CM

## 2024-01-18 DIAGNOSIS — R23.4 CHANGES IN SKIN TEXTURE: ICD-10-CM

## 2024-01-18 DIAGNOSIS — F32.5 MAJOR DEPRESSIVE DISORDER, SINGLE EPISODE, IN FULL REMISSION: ICD-10-CM

## 2024-01-18 DIAGNOSIS — D64.9 ANEMIA, UNSPECIFIED: ICD-10-CM

## 2024-01-18 DIAGNOSIS — E03.9 HYPOTHYROIDISM, UNSPECIFIED: ICD-10-CM

## 2024-01-18 DIAGNOSIS — Z79.82 LONG TERM (CURRENT) USE OF ASPIRIN: ICD-10-CM

## 2024-01-18 DIAGNOSIS — Z79.890 HORMONE REPLACEMENT THERAPY: ICD-10-CM

## 2024-01-18 DIAGNOSIS — E55.9 VITAMIN D DEFICIENCY, UNSPECIFIED: ICD-10-CM

## 2024-01-18 LAB
25(OH)D3 SERPL-MCNC: 34.6 NG/ML
ALBUMIN SERPL ELPH-MCNC: 3.8 G/DL
ALP BLD-CCNC: 109 U/L
ALT SERPL-CCNC: 20 U/L
ANION GAP SERPL CALC-SCNC: 10 MMOL/L
AST SERPL-CCNC: 23 U/L
BASOPHILS # BLD AUTO: 0.07 K/UL
BASOPHILS NFR BLD AUTO: 0.9 %
BILIRUB SERPL-MCNC: 0.3 MG/DL
BUN SERPL-MCNC: 26 MG/DL
CALCIUM SERPL-MCNC: 8.8 MG/DL
CHLORIDE SERPL-SCNC: 100 MMOL/L
CHOLEST SERPL-MCNC: 178 MG/DL
CO2 SERPL-SCNC: 27 MMOL/L
CREAT SERPL-MCNC: 0.67 MG/DL
EGFR: 91 ML/MIN/1.73M2
EOSINOPHIL # BLD AUTO: 0.31 K/UL
EOSINOPHIL NFR BLD AUTO: 3.9 %
ESTIMATED AVERAGE GLUCOSE: 194 MG/DL
FERRITIN SERPL-MCNC: 83 NG/ML
GLUCOSE SERPL-MCNC: 256 MG/DL
HBA1C MFR BLD HPLC: 8.4 %
HCT VFR BLD CALC: 32 %
HDLC SERPL-MCNC: 92 MG/DL
HGB BLD-MCNC: 10.7 G/DL
IMM GRANULOCYTES NFR BLD AUTO: 0.2 %
IRON SATN MFR SERPL: 23 %
IRON SERPL-MCNC: 69 UG/DL
LDLC SERPL CALC-MCNC: 76 MG/DL
LYMPHOCYTES # BLD AUTO: 1.24 K/UL
LYMPHOCYTES NFR BLD AUTO: 15.4 %
MAN DIFF?: NORMAL
MCHC RBC-ENTMCNC: 31.3 PG
MCHC RBC-ENTMCNC: 33.4 GM/DL
MCV RBC AUTO: 93.6 FL
MONOCYTES # BLD AUTO: 0.84 K/UL
MONOCYTES NFR BLD AUTO: 10.4 %
NEUTROPHILS # BLD AUTO: 5.57 K/UL
NEUTROPHILS NFR BLD AUTO: 69.2 %
NONHDLC SERPL-MCNC: 86 MG/DL
PLATELET # BLD AUTO: 240 K/UL
POTASSIUM SERPL-SCNC: 4.4 MMOL/L
PROT SERPL-MCNC: 6 G/DL
RBC # BLD: 3.42 M/UL
RBC # FLD: 13.8 %
SODIUM SERPL-SCNC: 136 MMOL/L
TIBC SERPL-MCNC: 297 UG/DL
TRIGL SERPL-MCNC: 54 MG/DL
TSH SERPL-ACNC: 1.09 UIU/ML
UIBC SERPL-MCNC: 227 UG/DL
WBC # FLD AUTO: 8.05 K/UL

## 2024-01-18 PROCEDURE — G0463: CPT

## 2024-01-18 PROCEDURE — 99203 OFFICE O/P NEW LOW 30 MIN: CPT

## 2024-01-18 RX ORDER — LOSARTAN POTASSIUM 100 MG/1
100 TABLET, FILM COATED ORAL
Qty: 90 | Refills: 1 | Status: ACTIVE | COMMUNITY
Start: 2020-02-04 | End: 1900-01-01

## 2024-01-18 NOTE — PLAN
[FreeTextEntry1] : Continue Bactroban, to see Dr. Swift in two weeks. 25 minutes spent for patient care and medical decision making.

## 2024-01-18 NOTE — PHYSICAL EXAM
[Normal Breath Sounds] : Normal breath sounds [Normal Heart Sounds] : normal heart sounds [2+] : left 2+ [1+] : left 1+ [Ankle Swelling Bilaterally] : bilaterally  [Alert] : alert [Oriented to Person] : oriented to person [Oriented to Place] : oriented to place [Oriented to Time] : oriented to time [Calm] : calm [Ankle Swelling (On Exam)] : not present [Varicose Veins Of Lower Extremities] : not present [] : not present [de-identified] : Well-developed, Well-nourished in no acute distress. [de-identified] : Within normal limits. [de-identified] : Within normal limits. [de-identified] : Left heel crack is almost healed, no acute infection. [4 x 4] : 4 x 4  [de-identified] : left heel fissure - closed  [de-identified] : Bactroban  [de-identified] : Mechanically cleansed with sterile gauze and normal saline. Kerlix [de-identified] : None [de-identified] : Normal [de-identified] : None [de-identified] : None [de-identified] : No [de-identified] : Daily [de-identified] : Primary Dressing

## 2024-01-18 NOTE — ASSESSMENT
[Verbal] : Verbal [Written] : Written [Demo] : Demo [Patient] : Patient [Caregiver] : Caregiver [Good - alert, interested, motivated] : Good - alert, interested, motivated [Verbalizes knowledge/Understanding] : Verbalizes knowledge/understanding [Dressing changes] : dressing changes [Foot Care] : foot care [Skin Care] : skin care [Signs and symptoms of infection] : sign and symptoms of infection [Nutrition] : nutrition [Arterial Disease] : arterial disease [How and When to Call] : how and when to call [Labs and Tests] : labs and tests [Pain Management] : pain management [Off-loading] : off-loading [Glycemic Control] : glycemic control [] : Yes [FreeTextEntry2] : Infection prevention Localized wound care  Goal remaining pain free regarding wounds Offloading  Low glycemic diet  [FreeTextEntry4] : Vascular studies reviewed by MD with patient.  Pt has supplies and preforms her own dressing changes.  Follow u in 2 weeks [Stable] : stable [Home] : Home [Ambulatory] : Ambulatory [Not Applicable - Long Term Care/Home Health Agency] : Long Term Care/Home Health Agency: Not Applicable [FreeTextEntry1] : Normal PVR, no vascular intervention is needed.

## 2024-01-30 ENCOUNTER — OUTPATIENT (OUTPATIENT)
Dept: OUTPATIENT SERVICES | Facility: HOSPITAL | Age: 74
LOS: 1 days | Discharge: HOME | End: 2024-01-30
Payer: MEDICARE

## 2024-01-30 ENCOUNTER — APPOINTMENT (OUTPATIENT)
Dept: WOUND CARE | Facility: HOSPITAL | Age: 74
End: 2024-01-30
Payer: MEDICARE

## 2024-01-30 VITALS
HEIGHT: 61 IN | RESPIRATION RATE: 18 BRPM | DIASTOLIC BLOOD PRESSURE: 47 MMHG | SYSTOLIC BLOOD PRESSURE: 91 MMHG | BODY MASS INDEX: 24.35 KG/M2 | TEMPERATURE: 98.3 F | WEIGHT: 129 LBS | HEART RATE: 71 BPM | OXYGEN SATURATION: 99 %

## 2024-01-30 DIAGNOSIS — S91.309A UNSPECIFIED OPEN WOUND, UNSPECIFIED FOOT, INITIAL ENCOUNTER: ICD-10-CM

## 2024-01-30 DIAGNOSIS — E10.40 TYPE 1 DIABETES MELLITUS WITH DIABETIC NEUROPATHY, UNSPECIFIED: ICD-10-CM

## 2024-01-30 DIAGNOSIS — Z98.890 OTHER SPECIFIED POSTPROCEDURAL STATES: Chronic | ICD-10-CM

## 2024-01-30 DIAGNOSIS — R23.4 CHANGES IN SKIN TEXTURE: ICD-10-CM

## 2024-01-30 PROCEDURE — G0463: CPT

## 2024-01-30 PROCEDURE — 99213 OFFICE O/P EST LOW 20 MIN: CPT

## 2024-01-30 NOTE — REVIEW OF SYSTEMS
[Fever] : no fever [Chills] : no chills [Eye Pain] : no eye pain [Loss Of Hearing] : no hearing loss [Shortness Of Breath] : no shortness of breath [Abdominal Pain] : no abdominal pain [Joint Stiffness] : joint stiffness [Anxiety] : no anxiety [Easy Bleeding] : no tendency for easy bleeding [Negative] : Genitourinary [FreeTextEntry5] : HTN, HLD  [de-identified] : Dry skin bilateral , absence of hair growth , inactive paronychia of the left lateral 2nd toe , no drainage , , some old eschar and minimal erythma  [de-identified] : IDDM with neuropathy  [de-identified] : IDDM , type 1

## 2024-01-30 NOTE — PLAN
[FreeTextEntry1] : Patient happy with outcome of treatment. Continue with moisturizing the feet. All questions answered to satisfaction and patient verbalized understanding. Spent 20 minutes for patient care and medical decision making. Patient to return on an as needed basis.

## 2024-01-30 NOTE — PHYSICAL EXAM
[0] : left 0 [1+] : left 1+ [Ankle Swelling (On Exam)] : present [Ankle Swelling Bilaterally] : bilaterally  [Ankle Swelling On The Right] : mild [Varicose Veins Of Lower Extremities] : bilaterally [Ankle Swelling On The Left] : moderate [] : bilaterally [No Rash or Lesion] : No rash or lesion [Purpura] : no purpura  [Petechiae] : no petechiae [Skin Ulcer] : no ulcer [Skin Induration] : no induration [Alert] : alert [Oriented to Person] : oriented to person [Oriented to Place] : oriented to place [Oriented to Time] : oriented to time [Calm] : calm [de-identified] : calm [de-identified] : HTN, HLD [de-identified] : heel fissure healed [de-identified] : IDDM with neuropathy  [FreeTextEntry1] : Left Heel Fissure [de-identified] : No Product [de-identified] :  Mechanically cleansed with NS 0.9%, Sterile Gauze [de-identified] : False [de-identified] : False [de-identified] : False [de-identified] : False [de-identified] : False [de-identified] : False

## 2024-01-31 DIAGNOSIS — D64.9 ANEMIA, UNSPECIFIED: ICD-10-CM

## 2024-01-31 DIAGNOSIS — Z87.440 PERSONAL HISTORY OF URINARY (TRACT) INFECTIONS: ICD-10-CM

## 2024-01-31 DIAGNOSIS — Z79.899 OTHER LONG TERM (CURRENT) DRUG THERAPY: ICD-10-CM

## 2024-01-31 DIAGNOSIS — E05.00 THYROTOXICOSIS WITH DIFFUSE GOITER WITHOUT THYROTOXIC CRISIS OR STORM: ICD-10-CM

## 2024-01-31 DIAGNOSIS — Z79.890 HORMONE REPLACEMENT THERAPY: ICD-10-CM

## 2024-01-31 DIAGNOSIS — Z82.49 FAMILY HISTORY OF ISCHEMIC HEART DISEASE AND OTHER DISEASES OF THE CIRCULATORY SYSTEM: ICD-10-CM

## 2024-01-31 DIAGNOSIS — E78.5 HYPERLIPIDEMIA, UNSPECIFIED: ICD-10-CM

## 2024-01-31 DIAGNOSIS — Z79.4 LONG TERM (CURRENT) USE OF INSULIN: ICD-10-CM

## 2024-01-31 DIAGNOSIS — Z79.82 LONG TERM (CURRENT) USE OF ASPIRIN: ICD-10-CM

## 2024-01-31 DIAGNOSIS — E10.40 TYPE 1 DIABETES MELLITUS WITH DIABETIC NEUROPATHY, UNSPECIFIED: ICD-10-CM

## 2024-01-31 DIAGNOSIS — R23.4 CHANGES IN SKIN TEXTURE: ICD-10-CM

## 2024-01-31 DIAGNOSIS — E03.9 HYPOTHYROIDISM, UNSPECIFIED: ICD-10-CM

## 2024-01-31 DIAGNOSIS — Z98.890 OTHER SPECIFIED POSTPROCEDURAL STATES: ICD-10-CM

## 2024-01-31 DIAGNOSIS — I10 ESSENTIAL (PRIMARY) HYPERTENSION: ICD-10-CM

## 2024-01-31 DIAGNOSIS — Z98.49 CATARACT EXTRACTION STATUS, UNSPECIFIED EYE: ICD-10-CM

## 2024-01-31 DIAGNOSIS — F32.5 MAJOR DEPRESSIVE DISORDER, SINGLE EPISODE, IN FULL REMISSION: ICD-10-CM

## 2024-01-31 DIAGNOSIS — E55.9 VITAMIN D DEFICIENCY, UNSPECIFIED: ICD-10-CM

## 2024-02-08 ENCOUNTER — RX RENEWAL (OUTPATIENT)
Age: 74
End: 2024-02-08

## 2024-02-08 ENCOUNTER — APPOINTMENT (OUTPATIENT)
Dept: ENDOCRINOLOGY | Facility: CLINIC | Age: 74
End: 2024-02-08

## 2024-02-08 RX ORDER — INSULIN DEGLUDEC INJECTION 100 U/ML
100 INJECTION, SOLUTION SUBCUTANEOUS
Qty: 1 | Refills: 1 | Status: ACTIVE | COMMUNITY
Start: 2022-12-01 | End: 1900-01-01

## 2024-02-09 NOTE — ASSESSMENT
[FreeTextEntry1] : Type 1 DM  On Longterm insulin Dexcom not working, needs to set up clarity arcelia. Looked at the phone Sugars are high in the morning and drops in the early morning. Needs meal insulin 15 minutes before the meals  We agreed on the following plan:  Lower Tresiba 14 unit at 2PM Before meals check your sugars, take  your premeal humalog dose according the following scale: Only if sugars are below 70, you can give 4 units of Novolog after meals  But if sugars are in the following ranges before meal give the following  Sugars : Novolog 5 units before the meals  Sugars >150-199: 6  unit NovoLog  Sugars >200-249: 7 units Novolog  any sugars above 250: only 8 units  Continue to check sugars 3-4 times a day. Please make an appointment with our APN Continue on healthy diet. Continue to exercise. Please see an eye doctor Please see a foot doctor Set up an appointment with CDE   Celia Jimenez is present 5 days per week and has S.O on weekend to help     HLD On statin Was on praluent in the past  LDL   HTN Bp acceptable  On Ace inh/ARB  Hypothyroidism- cont 125 mcg 7 days a week  Discussed with Dr. Das, initially had Graves, but she was treated for hypothyroidism for long time.   Graves ophthalmopathy - off Tepezza seems stable - to see Neuro opthalmology   Vit D Def -continue MV Level acceptable       Glucose Sensor Necessity: This patient with diabetes performs 4 or more glucose checks per day utilizing a Continuous glucose monitor. The patient is treated with insulin via 4 injections daily ) This patient requires frequent adjustments to their insulin treatment on the basis of therapeutic continuous glucose monitoring results. In addition, the patient has been to our office for an evaluation of their diabetes control within the past 6 months.   I spent 40 minutes discussing with patient face to face and non face to face reviewing documentations, labs, and/or imaging, also discussing the management plans.   RTC in 6 weeks

## 2024-02-09 NOTE — PHYSICAL EXAM
[Alert] : alert [Well Nourished] : well nourished [No Neck Mass] : no neck mass was observed [No LAD] : no lymphadenopathy [Thyroid Not Enlarged] : the thyroid was not enlarged [No Respiratory Distress] : no respiratory distress [#1 Diminished] : number 1 was normal [#2 Diminished] : number 2 was normal [#3 Diminished] : number 3 was normal [#4 Diminished] : number 4 was normal [#5 Diminished] : number 5 was normal [FreeTextEntry4] : Onychomycosis in the toe nails [FreeTextEntry8] : Onychomycosis in the toe nails

## 2024-02-09 NOTE — HISTORY OF PRESENT ILLNESS
[FreeTextEntry1] : Quality: T1DM Severity: moderate Duration: 48 years Onset: leg cramps Modifying Factors: insulin   HgbA1C:8.4%  SMBG has CGM, but says had issues with G7 as it was only lasting 5 days. Tried in the office, but clarity arcelia is not set up. Showed me a log   Current drug regimen Tresiba 14 unit at 2PM Celia Jimenez is present 5 days per week and has S.O on weekend to help Novolog 6 -8units TID will often take 2 hrs after meals if  <100 often pt is taking mealtime insulin 2 hours post meals  Eye exam: no longer getting tapezza- has prism lens -in 10/23, No DR  Foot exam: Has wound care on 1/18/23 Kidney disease: denies Heart disease: denies  Weight: stable Diet: follows a diabetic diet Exercise; stopped classes Smoking: denies  Graves ophthalmopathy now wearing prisms -has gotten Tapezza in the past Hypothyroidism, likely got DAWKINS in the past, patient does not remember    LT4 125 mcg d 1 tab daily    Vit D Def -On MTV   Memory issue- has memory issues

## 2024-03-14 ENCOUNTER — APPOINTMENT (OUTPATIENT)
Dept: ENDOCRINOLOGY | Facility: CLINIC | Age: 74
End: 2024-03-14
Payer: MEDICARE

## 2024-03-14 VITALS — SYSTOLIC BLOOD PRESSURE: 120 MMHG | DIASTOLIC BLOOD PRESSURE: 54 MMHG

## 2024-03-14 VITALS — OXYGEN SATURATION: 96 % | BODY MASS INDEX: 24.55 KG/M2 | WEIGHT: 130 LBS | HEART RATE: 62 BPM | HEIGHT: 61 IN

## 2024-03-14 DIAGNOSIS — E78.5 HYPERLIPIDEMIA, UNSPECIFIED: ICD-10-CM

## 2024-03-14 DIAGNOSIS — E03.9 HYPOTHYROIDISM, UNSPECIFIED: ICD-10-CM

## 2024-03-14 DIAGNOSIS — E05.00 THYROTOXICOSIS WITH DIFFUSE GOITER W/OUT THYROTOXIC CRISIS OR STORM: ICD-10-CM

## 2024-03-14 DIAGNOSIS — E11.319 TYPE 2 DIABETES MELLITUS WITH UNSPECIFIED DIABETIC RETINOPATHY W/OUT MACULAR EDEMA: ICD-10-CM

## 2024-03-14 DIAGNOSIS — I10 ESSENTIAL (PRIMARY) HYPERTENSION: ICD-10-CM

## 2024-03-14 PROCEDURE — G2211 COMPLEX E/M VISIT ADD ON: CPT

## 2024-03-14 PROCEDURE — 99214 OFFICE O/P EST MOD 30 MIN: CPT

## 2024-03-14 NOTE — PHYSICAL EXAM
[Alert] : alert [Well Nourished] : well nourished [No Neck Mass] : no neck mass was observed [No LAD] : no lymphadenopathy [Thyroid Not Enlarged] : the thyroid was not enlarged [No Respiratory Distress] : no respiratory distress

## 2024-03-25 ENCOUNTER — RX RENEWAL (OUTPATIENT)
Age: 74
End: 2024-03-25

## 2024-03-25 RX ORDER — ATORVASTATIN CALCIUM 10 MG/1
10 TABLET, FILM COATED ORAL DAILY
Qty: 90 | Refills: 0 | Status: ACTIVE | COMMUNITY
Start: 2023-12-27 | End: 1900-01-01

## 2024-03-26 NOTE — CONSULT NOTE ADULT - SUBJECTIVE AND OBJECTIVE BOX
Patient is a 69y old  Female who presents with a chief complaint of Unsteady Gait (20 Jan 2020 14:16)      Reason For Consult:     HPI:  Pt is a 70 yo F sent in from her PMD office due to unsteady gait to be evaluated for CVA. PMH DM1 on insulin pump, HTN, Hypothyroidism (had graves disease in the past), HLD.   She states that this morning when she awoke out of bed she felt unsteady on her feet, she could not keep her balance and was dizzy. She went to see her PMD and there was concern for stroke and so she waas sent to the ED.   In the ED patient continues to feel "wobbly" on her feet and said she has dysequilibrium. Denies any past hx of TIA/CVA/MI. She has no other focal deficitis, slurred speech, weakness, nausea, vomiting, chest pain, palpitations, SOB, abd pain, diarrhea, melena, hematochezia, dysuria.   In ED patient seen by neurology who recommended admission, CT head without acute stroke, CTA Head and neck unremarkable. MRI and TTE pending. (20 Jan 2020 14:16)      PAST MEDICAL & SURGICAL HISTORY:  Graves disease  Hyperlipidemia  Diabetes mellitus: type 1  H/O hand surgery      FAMILY HISTORY:        Social History:    MEDICATIONS  (STANDING):  aspirin 325 milliGRAM(s) Oral daily  calcium carbonate 1250 mG  + Vitamin D (OsCal 500 + D) 1 Tablet(s) Oral daily  dextrose 5%. 1000 milliLiter(s) (50 mL/Hr) IV Continuous <Continuous>  dextrose 50% Injectable 12.5 Gram(s) IV Push once  dextrose 50% Injectable 25 Gram(s) IV Push once  dextrose 50% Injectable 25 Gram(s) IV Push once  heparin  Injectable 5000 Unit(s) SubCutaneous every 8 hours  insulin lispro (HumaLOG) corrective regimen sliding scale   SubCutaneous three times a day before meals  levothyroxine 150 MICROGram(s) Oral daily  loratadine 10 milliGRAM(s) Oral daily    MEDICATIONS  (PRN):  dextrose 40% Gel 15 Gram(s) Oral once PRN Blood Glucose LESS THAN 70 milliGRAM(s)/deciliter  glucagon  Injectable 1 milliGRAM(s) IntraMuscular once PRN Glucose LESS THAN 70 milligrams/deciliter  ondansetron Injectable 4 milliGRAM(s) IV Push every 6 hours PRN Nausea        T(C): 36.8 (01-21-20 @ 08:01), Max: 36.8 (01-21-20 @ 08:01)  HR: 79 (01-21-20 @ 08:01) (60 - 79)  BP: 131/66 (01-21-20 @ 08:01) (131/66 - 221/68)  RR: 17 (01-21-20 @ 08:01) (16 - 18)  SpO2: 97% (01-21-20 @ 08:01) (97% - 100%)  Wt(kg): --    PHYSICAL EXAM:  GENERAL: NAD, well-groomed, well-developed  HEAD:  Atraumatic, Normocephalic  NECK: Supple, No JVD, Normal thyroid  CHEST/LUNG: Clear to percussion bilaterally; No rales, rhonchi, wheezing, or rubs  HEART: Regular rate and rhythm; No murmurs, rubs, or gallops  ABDOMEN: Soft, Nontender, Nondistended; Bowel sounds present  EXTREMITIES:  2+ Peripheral Pulses, No clubbing, cyanosis, or edema  SKIN: No rashes or lesions    CAPILLARY BLOOD GLUCOSE  171 (20 Jan 2020 18:03)      POCT Blood Glucose.: 130 mg/dL (21 Jan 2020 07:46)  POCT Blood Glucose.: 213 mg/dL (20 Jan 2020 21:23)  POCT Blood Glucose.: 171 mg/dL (20 Jan 2020 16:56)  POCT Blood Glucose.: 280 mg/dL (20 Jan 2020 11:45)                            11.7   5.22  )-----------( 210      ( 21 Jan 2020 06:31 )             34.0       CMP:  01-21 @ 06:31  SGPT --  Albumin --   Alk Phos --   Anion Gap 4   SGOT --   Total Bili --   BUN 14   Calcium Total 8.2   CO2 31   Chloride 107   Creatinine 0.65   eGFR if    eGFR if non AA 89   Glucose 158   Potassium 3.7   Protein --   Sodium 142      Thyroid Function Tests:  01-21 @ 06:31 TSH 0.04 FreeT4 -- T3 -- Anti TPO -- Anti Thyroglobulin Ab -- TSI --      Diabetes Tests: 01-20 @ 18:51 HbA1c 7.8 C-Peptide --         Radiology:
ataxia rule out cva  echo  mri head  asa  statin   pt eval
no

## 2024-03-29 ENCOUNTER — APPOINTMENT (OUTPATIENT)
Dept: ENDOCRINOLOGY | Facility: CLINIC | Age: 74
End: 2024-03-29
Payer: MEDICARE

## 2024-03-29 PROCEDURE — G0108 DIAB MANAGE TRN  PER INDIV: CPT

## 2024-04-01 ENCOUNTER — NON-APPOINTMENT (OUTPATIENT)
Age: 74
End: 2024-04-01

## 2024-04-01 LAB
ALBUMIN SERPL ELPH-MCNC: 4 G/DL
ALP BLD-CCNC: 94 U/L
ALT SERPL-CCNC: 18 U/L
ANION GAP SERPL CALC-SCNC: 11 MMOL/L
AST SERPL-CCNC: 19 U/L
BILIRUB SERPL-MCNC: 0.3 MG/DL
BUN SERPL-MCNC: 19 MG/DL
CALCIUM SERPL-MCNC: 9.1 MG/DL
CHLORIDE SERPL-SCNC: 100 MMOL/L
CHOLEST SERPL-MCNC: 175 MG/DL
CO2 SERPL-SCNC: 26 MMOL/L
CREAT SERPL-MCNC: 0.66 MG/DL
EGFR: 91 ML/MIN/1.73M2
ESTIMATED AVERAGE GLUCOSE: 206 MG/DL
GLUCOSE SERPL-MCNC: 209 MG/DL
HBA1C MFR BLD HPLC: 8.8 %
HDLC SERPL-MCNC: 87 MG/DL
LDLC SERPL CALC-MCNC: 77 MG/DL
NONHDLC SERPL-MCNC: 88 MG/DL
POTASSIUM SERPL-SCNC: 4.3 MMOL/L
PROT SERPL-MCNC: 5.9 G/DL
SODIUM SERPL-SCNC: 137 MMOL/L
T4 FREE SERPL-MCNC: 1.7 NG/DL
TRIGL SERPL-MCNC: 56 MG/DL
TSH SERPL-ACNC: 0.61 UIU/ML

## 2024-04-18 RX ORDER — ALIROCUMAB 75 MG/ML
75 INJECTION, SOLUTION SUBCUTANEOUS
Qty: 6 | Refills: 0 | Status: ACTIVE | COMMUNITY
Start: 2020-03-18

## 2024-04-29 RX ORDER — AMLODIPINE BESYLATE 10 MG/1
10 TABLET ORAL
Qty: 90 | Refills: 1 | Status: ACTIVE | COMMUNITY
Start: 2021-11-16 | End: 1900-01-01

## 2024-04-30 NOTE — PHYSICAL THERAPY INITIAL EVALUATION ADULT - NAME OF CLINICIAN
Subjective:      Patient ID: Jeanna Helm is a 41 y.o. female.    Vitals:  vitals were not taken for this visit.     Chief Complaint: Toe Pain      Visit Type: TELE AUDIOVISUAL    Present with the patient at the time of consultation: TELEMED PRESENT WITH PATIENT: None, at home    Past Medical History:   Diagnosis Date    Back pain     Diabetes mellitus     Hypertension     Left knee pain     Miscarriage     x2    Stroke      Past Surgical History:   Procedure Laterality Date    ANTERIOR CRUCIATE LIGAMENT REPAIR Left     DILATION AND CURETTAGE OF UTERUS      x2    LUMBAR FUSION      TIBIA FRACTURE SURGERY Right      Review of patient's allergies indicates:   Allergen Reactions    Neurontin [gabapentin] Itching and Blisters    Percocet [oxycodone-acetaminophen] Itching and Blisters     Current Outpatient Medications on File Prior to Visit   Medication Sig Dispense Refill    acetaminophen (TYLENOL) 325 MG tablet Take 325 mg by mouth every 6 (six) hours as needed for Pain.      amLODIPine (NORVASC) 10 MG tablet Take 1 tablet (10 mg total) by mouth once daily. 30 tablet 1    butalbital-acetaminophen-caffeine -40 mg (FIORICET, ESGIC) -40 mg per tablet Take 1 tablet by mouth every 6 (six) hours as needed for Pain. 7 tablet 0    cephALEXin (KEFLEX) 500 MG capsule Take 1 capsule (500 mg total) by mouth every 8 (eight) hours. for 7 days 21 capsule 0    glyBURIDE (DIABETA) 5 MG tablet Take 1 tablet (5 mg total) by mouth daily with breakfast. 90 tablet 3    HYDROcodone-acetaminophen (NORCO) 5-325 mg per tablet Take 0.5 tablets by mouth every 4 (four) hours as needed for Pain. 5 tablet 0    ibuprofen (ADVIL,MOTRIN) 800 MG tablet Take 1 tablet (800 mg total) by mouth every 6 (six) hours as needed for Pain. 20 tablet 0    lisinopriL (PRINIVIL,ZESTRIL) 40 MG tablet Take 1 tablet (40 mg total) by mouth once daily. 30 tablet 0    meclizine (ANTIVERT) 25 mg tablet Take 1 tablet (25 mg total) by mouth 3 (three) times daily  as needed. 20 tablet 0    meclizine (ANTIVERT) 25 mg tablet Take 1 tablet (25 mg total) by mouth 3 (three) times daily as needed. 20 tablet 0    medroxyPROGESTERone (PROVERA) 10 MG tablet Take 1 tablet (10 mg total) by mouth once daily. for 5 days 5 tablet 0    metoclopramide HCl (REGLAN) 10 MG tablet Take 1 tablet (10 mg total) by mouth every 6 (six) hours as needed (headache or nausea). 16 tablet 0    metoprolol succinate (TOPROL-XL) 50 MG 24 hr tablet Take 50 mg by mouth.      naproxen (NAPROSYN) 500 MG tablet Take 1 tablet (500 mg total) by mouth 2 (two) times daily with meals. 20 tablet 0    traMADoL (ULTRAM) 50 mg tablet Take 1 tablet (50 mg total) by mouth every 6 (six) hours as needed for Pain. 10 tablet 0     No current facility-administered medications on file prior to visit.     Family History   Problem Relation Name Age of Onset    Hypertension Mother      Heart disease Mother      Fibromyalgia Mother      Diabetes Father             Ohs Peq Odvv Intake    4/30/2024  9:25 AM CDT - Filed by Patient   What is your current physical address in the event of a medical emergency? [id number] [facility], [place], [facility] by [facility] [address] [place] [address]   Are you able to take your vital signs? No   Please attach any relevant images or files          Right toe pain. + discoloration and swelling. +burning and pain. Seen 4/23 by me for the same, referred in person. Seen in ED. Xray negative. Put on antibiotics, pain meds and referred to Podiatry and PCP. Concerned for pain and seeking further treatment options.    Toe Pain         Musculoskeletal:  Positive for pain, joint pain and joint swelling. Negative for trauma.   Skin:  Positive for color change.        Objective:   The physical exam was conducted virtually.  Physical Exam   Constitutional: She is oriented to person, place, and time. She does not appear ill. No distress.   HENT:   Head: Normocephalic and atraumatic.   Nose: Nose normal.   Eyes: Extraocular movement intact    Pulmonary/Chest: Effort normal.   Abdominal: Normal appearance.   Musculoskeletal: Normal range of motion.         General: Normal range of motion.   Neurological: no focal deficit. She is alert and oriented to person, place, and time.   Psychiatric: Her behavior is normal. Mood normal.   Vitals reviewed.      Assessment:     1. Pain in right toe(s)        Plan:   Video connection disconnected prior to completing call.    Recommend follow-up in person for further pain control. Podiatry and Primary Care are needed for follow-up and continued treatment.    Patient encouraged to monitor symptoms closely and instructed to follow-up for new or worsening symptoms. Further, in-person, evaluation may be necessary for continued treatment. Please follow up with your primary care doctor or specialist as needed. Verbally discussed plan. Patient confirms understanding and is in agreement with treatment and plan.     You must understand that you've received a Virtual Care evaluation only and that you may be released before all your medical problems are known or treated. You, the patient, will arrange for follow up care as instructed.      Pain in right toe(s)                      nurse kevin

## 2024-05-08 ENCOUNTER — APPOINTMENT (OUTPATIENT)
Dept: WOUND CARE | Facility: HOSPITAL | Age: 74
End: 2024-05-08
Payer: MEDICARE

## 2024-05-08 ENCOUNTER — OUTPATIENT (OUTPATIENT)
Dept: OUTPATIENT SERVICES | Facility: HOSPITAL | Age: 74
LOS: 1 days | Discharge: HOME | End: 2024-05-08
Payer: MEDICARE

## 2024-05-08 VITALS
WEIGHT: 130 LBS | HEIGHT: 61 IN | TEMPERATURE: 97.8 F | HEART RATE: 62 BPM | SYSTOLIC BLOOD PRESSURE: 104 MMHG | RESPIRATION RATE: 14 BRPM | BODY MASS INDEX: 24.55 KG/M2 | OXYGEN SATURATION: 98 % | DIASTOLIC BLOOD PRESSURE: 60 MMHG

## 2024-05-08 DIAGNOSIS — S91.309A UNSPECIFIED OPEN WOUND, UNSPECIFIED FOOT, INITIAL ENCOUNTER: ICD-10-CM

## 2024-05-08 DIAGNOSIS — L60.0 INGROWING NAIL: ICD-10-CM

## 2024-05-08 DIAGNOSIS — E11.40 TYPE 2 DIABETES MELLITUS WITH DIABETIC NEUROPATHY, UNSPECIFIED: ICD-10-CM

## 2024-05-08 DIAGNOSIS — Z98.890 OTHER SPECIFIED POSTPROCEDURAL STATES: Chronic | ICD-10-CM

## 2024-05-08 PROCEDURE — 99213 OFFICE O/P EST LOW 20 MIN: CPT

## 2024-05-08 PROCEDURE — G0463: CPT

## 2024-05-09 ENCOUNTER — APPOINTMENT (OUTPATIENT)
Dept: INTERNAL MEDICINE | Facility: CLINIC | Age: 74
End: 2024-05-09
Payer: MEDICARE

## 2024-05-09 VITALS
WEIGHT: 130 LBS | HEART RATE: 64 BPM | RESPIRATION RATE: 14 BRPM | BODY MASS INDEX: 24.55 KG/M2 | SYSTOLIC BLOOD PRESSURE: 100 MMHG | DIASTOLIC BLOOD PRESSURE: 62 MMHG | HEIGHT: 61 IN | TEMPERATURE: 97.5 F | OXYGEN SATURATION: 98 %

## 2024-05-09 DIAGNOSIS — I87.2 VENOUS INSUFFICIENCY (CHRONIC) (PERIPHERAL): ICD-10-CM

## 2024-05-09 PROBLEM — L60.0 INGROWING NAIL: Status: ACTIVE | Noted: 2024-05-09

## 2024-05-09 PROBLEM — E11.40 CONTROLLED DIABETES MELLITUS WITH DIABETIC NEUROPATHY: Status: ACTIVE | Noted: 2024-05-09

## 2024-05-09 PROCEDURE — 99213 OFFICE O/P EST LOW 20 MIN: CPT

## 2024-05-09 NOTE — PLAN
[FreeTextEntry1] : Discussed with patient about nonpermanent permanent options of nail avulsion.  Patient will think about more invasive options and opted for slant back procedure.  Due to the severe incurvated nail edge present and severe pain recommended a slant back procedure discussed risk complications and expected recovery course with the patient.  They understand that the nail margin will regrow and may become symptomatic again in the future.  Using freeze spray, medial border of the right hallux was prepped and using sterile nail nippers standard procedure was performed.  Topical bacitracin was applied and covered with Band-Aid. Discussed with patient to wear supportive shoe gear with wide toed shoes and extra depth in toe box to accommodate the deformity.  .RTC in 3 months. Spent 30 minutes for patient care and medical decision making.

## 2024-05-09 NOTE — PHYSICAL EXAM
[0] : left 0 [1+] : left 1+ [Alert] : alert [Oriented to Person] : oriented to person [Oriented to Place] : oriented to place [de-identified] : AOX3  [de-identified] : 4/5 muscle strength for all muscles and tendons crossing the foot and ankle joints, ankle joint and STJ ROM intact b/l. No pain with calf compression b/l. [de-identified] : Pain on palpation to the medial  nail border of the left hallux  with no edema and erythema, no drainage, no purulence, no proximal streaking  with incurvated medial and lateral border of te hallux b/l, border and hypertrophied labial nail fold appreciated. No pain to the rest of the toenails. Skin bilateral lower extremity is warm to touch with no open wounds or lesions, no HPKs, no bacterial or fungal infection to the rest of the nail. [de-identified] : NAIL BORDER TRIMMED BY DPM, MUPIROCIN APPLIED IN OFFICE, PT HAS MUPIROCIN AT HOME  [FreeTextEntry1] : left great toe medial border- INGROWN NAIL [de-identified] : MUPIROCIN/BACITRACIN [de-identified] : Mechanically cleansed with Sterile gauze and 0.9% Normal Saline BAND-AID  [de-identified] :  DP: +2 regular R PT: unpalpable due to edema, audible via doppler biphasic waveforms  L DP: +1 regular R PT: unpalpable due to edema, audible via doppler Biphasic waveforms  Vascular studies not ordered, studies were ordered on 1/2/24, vascular assessment completed on 1/18/24  cap refill <3 secs  skin: Normal. intact, warm to touch   [TWNoteComboBox4] : None [TWNoteComboBox5] : No [de-identified] : No [de-identified] : Normal [de-identified] : None [de-identified] : None [de-identified] : None [de-identified] : No [de-identified] : Daily

## 2024-05-09 NOTE — REVIEW OF SYSTEMS
[Fever] : no fever [Chills] : no chills [Red Eyes] : eyes not red [Earache] : no earache [Nosebleeds] : no nosebleeds [Chest Pain] : no chest pain [Shortness Of Breath] : no shortness of breath [Cough] : no cough [Abdominal Pain] : no abdominal pain [Vomiting] : no vomiting [Diarrhea] : no diarrhea [Skin Wound] : no skin wound [FreeTextEntry5] : HTN, HLD [de-identified] : Painful ingrown toenail b/l L>R  [de-identified] : memory disturbance  [de-identified] : TYPE I diabetes , neuropathy

## 2024-05-09 NOTE — HISTORY OF PRESENT ILLNESS
[FreeTextEntry1] : Pt is a 73 yr. old female w/ a pmHx of type1 diabetes, w/ peripheral neuropathy, HLD, HTN is being seen for a left great toe medial border pain. Pt reports pain occurred approximately 2 days ago and wanted to schedule appointment for podiatric evaluation.

## 2024-05-09 NOTE — ASSESSMENT
[FreeTextEntry1] :  73 yr. old female w/ a pmHx of type1 diabetes, w/ peripheral neuropathy, HLD, HTN here for b/l ankle swelling, aches  #venous insufficiency - rec compression stockings, elevation, exercise  #diffuse aches/pains - possible this is related to osteoarthritis however the more acute nature implies that she may have had an acute viral infection - can try OTC pain meds if needed - rec to return to exercise regimen

## 2024-05-09 NOTE — ASSESSMENT
[Verbal] : Verbal [Written] : Written [Demo] : Demo [Patient] : Patient [Good - alert, interested, motivated] : Good - alert, interested, motivated [Verbalizes knowledge/Understanding] : Verbalizes knowledge/understanding [Foot Care] : foot care [Skin Care] : skin care [Signs and symptoms of infection] : sign and symptoms of infection [Nutrition] : nutrition [How and When to Call] : how and when to call [Pain Management] : pain management [Patient responsibility to plan of care] : patient responsibility to plan of care [Glycemic Control] : glycemic control [] : Yes [Stable] : stable [Home] : Home [Ambulatory] : Ambulatory [Not Applicable - Long Term Care/Home Health Agency] : Long Term Care/Home Health Agency: Not Applicable [FreeTextEntry2] : infection prevention promote skin integrity glycemic control demonstrates use of both pharmacological and non pharmacological pain management interventions vascular testing   [FreeTextEntry3] : initial visit  [FreeTextEntry4] : F/U 5/13/24 TO THE [facility] W/ DR. BACA F/U TO [facility] PRN

## 2024-05-10 PROBLEM — I87.2 VENOUS INSUFFICIENCY: Status: ACTIVE | Noted: 2024-05-10

## 2024-05-10 NOTE — HISTORY OF PRESENT ILLNESS
[Formal Caregiver] : formal caregiver [FreeTextEntry8] : Pt is a 73 yr. old female w/ a pmHx of type1 diabetes, w/ peripheral neuropathy, HLD, HTN here to discuss b/l ankle edema worsening over past few months. She was seen by wound care / podiatry yesterday, plan for removal of L first toenail. Has been trying to elevate her legs more, is weary of trying compression stockings. Of note, was exercising at the gym regularly and stopped doing that a few months ago per aide at bedside.

## 2024-05-10 NOTE — PHYSICAL EXAM
[Normal] : normal rate, regular rhythm, normal S1 and S2 and no murmur heard [No Joint Swelling] : no joint swelling [Grossly Normal Strength/Tone] : grossly normal strength/tone [No Rash] : no rash [Alert and Oriented x3] : oriented to person, place, and time [de-identified] : trace pedal edema,  significant b/l varicosities  [de-identified] : +lindaden/may deformities consistent with osteoarthritis

## 2024-05-10 NOTE — PLAN
[FreeTextEntry1] : explained that her pedal edema is likely due to her venous insufficiency and her decrease activity  encouraged to go back to gym  wear compression stockings  elevate legs avoid salt intake

## 2024-05-13 ENCOUNTER — APPOINTMENT (OUTPATIENT)
Dept: ENDOCRINOLOGY | Facility: CLINIC | Age: 74
End: 2024-05-13
Payer: MEDICARE

## 2024-05-13 ENCOUNTER — NON-APPOINTMENT (OUTPATIENT)
Age: 74
End: 2024-05-13

## 2024-05-13 DIAGNOSIS — Z79.899 OTHER LONG TERM (CURRENT) DRUG THERAPY: ICD-10-CM

## 2024-05-13 DIAGNOSIS — E55.9 VITAMIN D DEFICIENCY, UNSPECIFIED: ICD-10-CM

## 2024-05-13 DIAGNOSIS — I10 ESSENTIAL (PRIMARY) HYPERTENSION: ICD-10-CM

## 2024-05-13 DIAGNOSIS — E05.00 THYROTOXICOSIS WITH DIFFUSE GOITER WITHOUT THYROTOXIC CRISIS OR STORM: ICD-10-CM

## 2024-05-13 DIAGNOSIS — L60.0 INGROWING NAIL: ICD-10-CM

## 2024-05-13 DIAGNOSIS — D64.9 ANEMIA, UNSPECIFIED: ICD-10-CM

## 2024-05-13 DIAGNOSIS — Z87.440 PERSONAL HISTORY OF URINARY (TRACT) INFECTIONS: ICD-10-CM

## 2024-05-13 DIAGNOSIS — E10.40 TYPE 1 DIABETES MELLITUS WITH DIABETIC NEUROPATHY, UNSPECIFIED: ICD-10-CM

## 2024-05-13 DIAGNOSIS — E78.5 HYPERLIPIDEMIA, UNSPECIFIED: ICD-10-CM

## 2024-05-13 DIAGNOSIS — Z98.49 CATARACT EXTRACTION STATUS, UNSPECIFIED EYE: ICD-10-CM

## 2024-05-13 DIAGNOSIS — Z98.890 OTHER SPECIFIED POSTPROCEDURAL STATES: ICD-10-CM

## 2024-05-13 DIAGNOSIS — E10.9 TYPE 1 DIABETES MELLITUS W/OUT COMPLICATIONS: ICD-10-CM

## 2024-05-13 DIAGNOSIS — Z79.82 LONG TERM (CURRENT) USE OF ASPIRIN: ICD-10-CM

## 2024-05-13 DIAGNOSIS — Z79.890 HORMONE REPLACEMENT THERAPY: ICD-10-CM

## 2024-05-13 DIAGNOSIS — E03.9 HYPOTHYROIDISM, UNSPECIFIED: ICD-10-CM

## 2024-05-13 DIAGNOSIS — Z82.49 FAMILY HISTORY OF ISCHEMIC HEART DISEASE AND OTHER DISEASES OF THE CIRCULATORY SYSTEM: ICD-10-CM

## 2024-05-13 DIAGNOSIS — F32.5 MAJOR DEPRESSIVE DISORDER, SINGLE EPISODE, IN FULL REMISSION: ICD-10-CM

## 2024-05-13 DIAGNOSIS — Z79.4 LONG TERM (CURRENT) USE OF INSULIN: ICD-10-CM

## 2024-05-13 PROCEDURE — 97803 MED NUTRITION INDIV SUBSEQ: CPT

## 2024-05-17 ENCOUNTER — APPOINTMENT (OUTPATIENT)
Dept: PODIATRY | Facility: CLINIC | Age: 74
End: 2024-05-17

## 2024-05-17 VITALS
BODY MASS INDEX: 24.55 KG/M2 | WEIGHT: 130 LBS | OXYGEN SATURATION: 99 % | SYSTOLIC BLOOD PRESSURE: 145 MMHG | HEART RATE: 61 BPM | TEMPERATURE: 97.8 F | HEIGHT: 61 IN | DIASTOLIC BLOOD PRESSURE: 52 MMHG

## 2024-05-17 PROCEDURE — 99213 OFFICE O/P EST LOW 20 MIN: CPT

## 2024-05-17 NOTE — HISTORY OF PRESENT ILLNESS
[FreeTextEntry1] : Quality: T1DM Severity: moderate Duration: 48 years Onset: leg cramps Modifying Factors: insulin   HgbA1C:8.4%  SM[facility] has CGM,  not set up yet, missed appointment with CDE Sugars are low in the morning, because she gives corrections in the early morning without eating and high in the afternoon   Current drug regimen Tresiba 14 unit at 2PM Novolog 6 -8units TID will often take 2 hrs after meals if  <100 Celia Jimenez is present 5 days per week and has S.O on weekend to help often pt is taking mealtime insulin 2 hours post meals  Eye exam: no longer getting tapezza- has prism lens -in 10/23, No DR  Foot exam: Has wound care on 1/18/23 Kidney disease: denies Heart disease: denies  Weight: stable Diet: follows a diabetic diet Exercise; stopped classes Smoking: denies  Graves ophthalmopathy now wearing prisms -has gotten Tapezza in the past Hypothyroidism, likely got DAWKINS in the past, patient does not remember    LT4 125 mcg d 1 tab daily    Vit D Def -On MTV   Memory issue- has memory issues

## 2024-05-17 NOTE — REASON FOR VISIT
[Initial Visit] : an initial visit for [Other:___] : [unfilled] [Formal Caregiver] : formal caregiver [FreeTextEntry2] : [facility] and was referred to come to the podiatry for feet and toenails.

## 2024-05-17 NOTE — ASSESSMENT
[FreeTextEntry1] : Type 1 DM  On Longterm insulin Dexcom not set up yeat  Sugars are low in the morning, because she gives corrections in the early morning without eating and high in the afternoon   We agreed on the following plan:  Continue Tresiba 14 unit at 2PM Before meals check your sugars, take your premeal humalog dose according the following scale: Only if sugars are below 70, you can give 4 units of Novolog after meals  But if sugars are in the following ranges before meal give the following  Before breakfast take 5 units of Novolog Before lunch take 6 units  Before dinner take 9 units if sugars are above 250, otherwise give 6 or 7 units if sugars are below 250   Continue to check sugars 3-4 times a day. Please make an appointment with our APN Continue on healthy diet. Continue to exercise. Please see an eye doctor Please see a foot doctor Set up an appointment with CDE   Celia Jimenez is present 5 days per week and has S.O on weekend to help     HLD On statin Was on praluent in the past  Check LDL   HTN Bp acceptable  On Ace inh/ARB  Hypothyroidism- cont 125 mcg 7 days a week  Initially had Graves, but she was treated for hypothyroidism for long time.   Graves ophthalmopathy - off Tepezza seems stable - to see Neuro ophthalmology   Vit D Def -continue MV Level acceptable    Needs labs    Glucose Sensor Necessity: This patient with diabetes performs 4 or more glucose checks per day. The patient is treated with insulin via 4 injections daily. This patient requires frequent adjustments to their insulin treatment on the basis of checking the fingersticks therefore CGM is warranted.    I spent 40 minutes discussing with patient face to face and non face to face reviewing documentations, labs, and/or imaging, also discussing the management plans.   RTC in 3 months

## 2024-06-15 ENCOUNTER — NON-APPOINTMENT (OUTPATIENT)
Age: 74
End: 2024-06-15

## 2024-06-17 ENCOUNTER — APPOINTMENT (OUTPATIENT)
Dept: ENDOCRINOLOGY | Facility: CLINIC | Age: 74
End: 2024-06-17

## 2024-06-17 ENCOUNTER — RX RENEWAL (OUTPATIENT)
Age: 74
End: 2024-06-17

## 2024-06-17 RX ORDER — INSULIN ASPART 100 [IU]/ML
100 INJECTION, SOLUTION INTRAVENOUS; SUBCUTANEOUS
Qty: 30 | Refills: 0 | Status: ACTIVE | COMMUNITY
Start: 2021-08-04 | End: 1900-01-01

## 2024-07-01 ENCOUNTER — APPOINTMENT (OUTPATIENT)
Dept: INTERNAL MEDICINE | Facility: CLINIC | Age: 74
End: 2024-07-01
Payer: MEDICARE

## 2024-07-01 VITALS
HEIGHT: 61 IN | BODY MASS INDEX: 24.55 KG/M2 | HEART RATE: 72 BPM | WEIGHT: 130 LBS | DIASTOLIC BLOOD PRESSURE: 60 MMHG | OXYGEN SATURATION: 97 % | SYSTOLIC BLOOD PRESSURE: 138 MMHG | RESPIRATION RATE: 14 BRPM | TEMPERATURE: 98.7 F

## 2024-07-01 DIAGNOSIS — J04.0 ACUTE LARYNGITIS: ICD-10-CM

## 2024-07-01 DIAGNOSIS — J06.9 ACUTE UPPER RESPIRATORY INFECTION, UNSPECIFIED: ICD-10-CM

## 2024-07-01 PROCEDURE — 99213 OFFICE O/P EST LOW 20 MIN: CPT

## 2024-07-16 ENCOUNTER — APPOINTMENT (OUTPATIENT)
Dept: INTERNAL MEDICINE | Facility: CLINIC | Age: 74
End: 2024-07-16

## 2024-07-26 ENCOUNTER — EMERGENCY (EMERGENCY)
Facility: HOSPITAL | Age: 74
LOS: 1 days | Discharge: HOME | End: 2024-07-26
Attending: STUDENT IN AN ORGANIZED HEALTH CARE EDUCATION/TRAINING PROGRAM | Admitting: STUDENT IN AN ORGANIZED HEALTH CARE EDUCATION/TRAINING PROGRAM
Payer: MEDICARE

## 2024-07-26 ENCOUNTER — APPOINTMENT (OUTPATIENT)
Dept: CARDIOLOGY | Facility: CLINIC | Age: 74
End: 2024-07-26

## 2024-07-26 VITALS
DIASTOLIC BLOOD PRESSURE: 63 MMHG | HEART RATE: 80 BPM | RESPIRATION RATE: 16 BRPM | OXYGEN SATURATION: 98 % | WEIGHT: 130.07 LBS | SYSTOLIC BLOOD PRESSURE: 136 MMHG | TEMPERATURE: 98 F | HEIGHT: 61 IN

## 2024-07-26 DIAGNOSIS — Z98.890 OTHER SPECIFIED POSTPROCEDURAL STATES: Chronic | ICD-10-CM

## 2024-07-26 LAB
ACETONE SERPL-MCNC: NEGATIVE — SIGNIFICANT CHANGE UP
ALBUMIN SERPL ELPH-MCNC: 3.2 G/DL — LOW (ref 3.3–5)
ALP SERPL-CCNC: 80 U/L — SIGNIFICANT CHANGE UP (ref 40–120)
ALT FLD-CCNC: 30 U/L — SIGNIFICANT CHANGE UP (ref 12–78)
ANION GAP SERPL CALC-SCNC: 10 MMOL/L — SIGNIFICANT CHANGE UP (ref 5–17)
APPEARANCE UR: CLEAR — SIGNIFICANT CHANGE UP
AST SERPL-CCNC: 24 U/L — SIGNIFICANT CHANGE UP (ref 15–37)
BASE EXCESS BLDV CALC-SCNC: 4.5 MMOL/L — HIGH (ref -2–3)
BASOPHILS # BLD AUTO: 0.03 K/UL — SIGNIFICANT CHANGE UP (ref 0–0.2)
BASOPHILS NFR BLD AUTO: 0.3 % — SIGNIFICANT CHANGE UP (ref 0–2)
BILIRUB SERPL-MCNC: 0.3 MG/DL — SIGNIFICANT CHANGE UP (ref 0.2–1.2)
BILIRUB UR-MCNC: NEGATIVE — SIGNIFICANT CHANGE UP
BUN SERPL-MCNC: 29 MG/DL — HIGH (ref 7–23)
CALCIUM SERPL-MCNC: 9.4 MG/DL — SIGNIFICANT CHANGE UP (ref 8.5–10.1)
CHLORIDE SERPL-SCNC: 98 MMOL/L — SIGNIFICANT CHANGE UP (ref 96–108)
CO2 SERPL-SCNC: 29 MMOL/L — SIGNIFICANT CHANGE UP (ref 22–31)
COLOR SPEC: YELLOW — SIGNIFICANT CHANGE UP
CREAT SERPL-MCNC: 1 MG/DL — SIGNIFICANT CHANGE UP (ref 0.5–1.3)
DIFF PNL FLD: NEGATIVE — SIGNIFICANT CHANGE UP
EGFR: 58 ML/MIN/1.73M2 — LOW
EOSINOPHIL # BLD AUTO: 0.05 K/UL — SIGNIFICANT CHANGE UP (ref 0–0.5)
EOSINOPHIL NFR BLD AUTO: 0.6 % — SIGNIFICANT CHANGE UP (ref 0–6)
GAS PNL BLDV: SIGNIFICANT CHANGE UP
GLUCOSE SERPL-MCNC: 320 MG/DL — HIGH (ref 70–99)
GLUCOSE UR QL: >=1000 MG/DL
HCO3 BLDV-SCNC: 30 MMOL/L — HIGH (ref 22–29)
HCT VFR BLD CALC: 32 % — LOW (ref 34.5–45)
HGB BLD-MCNC: 11 G/DL — LOW (ref 11.5–15.5)
IMM GRANULOCYTES NFR BLD AUTO: 0.3 % — SIGNIFICANT CHANGE UP (ref 0–0.9)
KETONES UR-MCNC: 40 MG/DL
LEUKOCYTE ESTERASE UR-ACNC: NEGATIVE — SIGNIFICANT CHANGE UP
LIDOCAIN IGE QN: 12 U/L — LOW (ref 13–75)
LYMPHOCYTES # BLD AUTO: 2.14 K/UL — SIGNIFICANT CHANGE UP (ref 1–3.3)
LYMPHOCYTES # BLD AUTO: 24.3 % — SIGNIFICANT CHANGE UP (ref 13–44)
MCHC RBC-ENTMCNC: 31.3 PG — SIGNIFICANT CHANGE UP (ref 27–34)
MCHC RBC-ENTMCNC: 34.4 GM/DL — SIGNIFICANT CHANGE UP (ref 32–36)
MCV RBC AUTO: 91.2 FL — SIGNIFICANT CHANGE UP (ref 80–100)
MONOCYTES # BLD AUTO: 0.96 K/UL — HIGH (ref 0–0.9)
MONOCYTES NFR BLD AUTO: 10.9 % — SIGNIFICANT CHANGE UP (ref 2–14)
NEUTROPHILS # BLD AUTO: 5.6 K/UL — SIGNIFICANT CHANGE UP (ref 1.8–7.4)
NEUTROPHILS NFR BLD AUTO: 63.6 % — SIGNIFICANT CHANGE UP (ref 43–77)
NITRITE UR-MCNC: NEGATIVE — SIGNIFICANT CHANGE UP
NRBC # BLD: 0 /100 WBCS — SIGNIFICANT CHANGE UP (ref 0–0)
PCO2 BLDV: 50 MMHG — HIGH (ref 39–42)
PH BLDV: 7.38 — SIGNIFICANT CHANGE UP (ref 7.32–7.43)
PH UR: 5 — SIGNIFICANT CHANGE UP (ref 5–8)
PLATELET # BLD AUTO: 228 K/UL — SIGNIFICANT CHANGE UP (ref 150–400)
PO2 BLDV: 132 MMHG — HIGH (ref 25–45)
POTASSIUM SERPL-MCNC: 4.1 MMOL/L — SIGNIFICANT CHANGE UP (ref 3.5–5.3)
POTASSIUM SERPL-SCNC: 4.1 MMOL/L — SIGNIFICANT CHANGE UP (ref 3.5–5.3)
PROT SERPL-MCNC: 6.4 G/DL — SIGNIFICANT CHANGE UP (ref 6–8.3)
PROT UR-MCNC: NEGATIVE MG/DL — SIGNIFICANT CHANGE UP
RBC # BLD: 3.51 M/UL — LOW (ref 3.8–5.2)
RBC # FLD: 14.1 % — SIGNIFICANT CHANGE UP (ref 10.3–14.5)
SAO2 % BLDV: 99 % — HIGH (ref 67–88)
SODIUM SERPL-SCNC: 137 MMOL/L — SIGNIFICANT CHANGE UP (ref 135–145)
SP GR SPEC: 1.02 — SIGNIFICANT CHANGE UP (ref 1–1.03)
TROPONIN I, HIGH SENSITIVITY RESULT: 13 NG/L — SIGNIFICANT CHANGE UP
UROBILINOGEN FLD QL: 0.2 MG/DL — SIGNIFICANT CHANGE UP (ref 0.2–1)
WBC # BLD: 8.81 K/UL — SIGNIFICANT CHANGE UP (ref 3.8–10.5)
WBC # FLD AUTO: 8.81 K/UL — SIGNIFICANT CHANGE UP (ref 3.8–10.5)

## 2024-07-26 PROCEDURE — 93010 ELECTROCARDIOGRAM REPORT: CPT

## 2024-07-26 PROCEDURE — 71045 X-RAY EXAM CHEST 1 VIEW: CPT | Mod: 26

## 2024-07-26 PROCEDURE — 99285 EMERGENCY DEPT VISIT HI MDM: CPT | Mod: FS

## 2024-07-26 RX ORDER — SODIUM CHLORIDE 0.9 % (FLUSH) 0.9 %
1000 SYRINGE (ML) INJECTION ONCE
Refills: 0 | Status: COMPLETED | OUTPATIENT
Start: 2024-07-26 | End: 2024-07-26

## 2024-07-26 RX ADMIN — Medication 1000 MILLILITER(S): at 22:13

## 2024-07-26 NOTE — ED PROVIDER NOTE - CARE PROVIDER_API CALL
Haydee Mckenzie  [address] [address]  Phone: [phone]  Fax: [phone]  Follow Up Time: 1-3 Days

## 2024-07-26 NOTE — PHYSICAL EXAM
[General Appearance - Well Developed] : well developed [Normal Appearance] : normal appearance [Well Groomed] : well groomed [General Appearance - Well Nourished] : well nourished [No Deformities] : no deformities [General Appearance - In No Acute Distress] : no acute distress [Normal Conjunctiva] : the conjunctiva exhibited no abnormalities [Eyelids - No Xanthelasma] : the eyelids demonstrated no xanthelasmas [Normal Oral Mucosa] : normal oral mucosa [No Oral Pallor] : no oral pallor [No Oral Cyanosis] : no oral cyanosis [Normal Jugular Venous A Waves Present] : normal jugular venous A waves present [Normal Jugular Venous V Waves Present] : normal jugular venous V waves present [No Jugular Venous Pérez A Waves] : no jugular venous pérez A waves [Respiration, Rhythm And Depth] : normal respiratory rhythm and effort [Exaggerated Use Of Accessory Muscles For Inspiration] : no accessory muscle use [Auscultation Breath Sounds / Voice Sounds] : lungs were clear to auscultation bilaterally [Abdomen Soft] : soft [Abdomen Tenderness] : non-tender [Abdomen Mass (___ Cm)] : no abdominal mass palpated [Abnormal Walk] : normal gait [Gait - Sufficient For Exercise Testing] : the gait was sufficient for exercise testing [Cyanosis, Localized] : no localized cyanosis [Nail Clubbing] : no clubbing of the fingernails [Petechial Hemorrhages (___cm)] : no petechial hemorrhages [Skin Color & Pigmentation] : normal skin color and pigmentation [] : no rash [No Venous Stasis] : no venous stasis [Skin Lesions] : no skin lesions [No Skin Ulcers] : no skin ulcer [No Xanthoma] : no  xanthoma was observed [Oriented To Time, Place, And Person] : oriented to person, place, and time [Affect] : the affect was normal [Mood] : the mood was normal [No Anxiety] : not feeling anxious [5th Left ICS - MCL] : palpated at the 5th LICS in the midclavicular line [Normal] : normal [No Precordial Heave] : no precordial heave was noted [Apical Thrill] : no thrill palpable at the apex [Normal Rate] : normal [Heart Rate ___] : [unfilled] bpm [Rhythm Regular] : regular [Normal S1] : normal S1 [Normal S2] : normal S2 [No Gallop] : no gallop heard [S3] : no S3 [S4] : no S4 [Click] : no click [Pericardial Rub] : no pericardial rub [I] : a grade 1 [Right Carotid Bruit] : right carotid bruit heard [Left Carotid Bruit] : no bruit heard over the left carotid [Right Femoral Bruit] : no bruit heard over the right femoral artery [Left Femoral Bruit] : no bruit heard over the left femoral artery [2+] : left 2+ [No Abnormalities] : the abdominal aorta was not enlarged and no bruit was heard [Bruit] : a bruit was heard [No Pitting Edema] : no pitting edema present [Rt] : no varicose veins of the right leg [Lt] : no varicose veins of the left leg

## 2024-07-26 NOTE — ED PROVIDER NOTE - CLINICAL SUMMARY MEDICAL DECISION MAKING FREE TEXT BOX
patient is 74-year-old female history of headache, DM who presents emergency department for hyperglycemia.  Patient has had 2 weeks of uncontrolled glucose fluctuating from the 300s to 60s.  Patient's previous endocrinologist has retired and has not seen endocrinologist in some time.  Patient today felt lightheaded when the patient come to the ED.  Patient denies fever, chills, headache, blurry vision, chest pain, shortness of breath, palpitations, abdominal pain, nausea, vomiting, numbness, weakness.  does endorse polyuria.      Patient glucose improved from 300s to 200s.  No signs of DKA.  No signs of infection.  Troponin negative.      Patient has appointment with new endocrinologist next week on Wednesday.   patient stable for discharge.  Patient like to go home.  Will discharge patient home with follow-up to PCP, endocrine and give return precautions.

## 2024-07-26 NOTE — ED PROVIDER NOTE - INTERNATIONAL TRAVEL
Prevention Guidelines, Women Ages 36 to 52  Screening tests and vaccines are an important part of managing your health. A screening test is done to find diseases in people who don't have any symptoms.  The goal is to find a disease early so lifestyle snyder sigmoidoscopy every 5 years, or  · Colonoscopy every 10 years, or  · CT colonography (virtual colonoscopy) every 5 years, or  · Yearly fecal occult blood test, or  · Yearly fecal immunochemical test every year, or  · Stool DNA test, every 3 years  If you c least 4 weeks after the first dose   Hepatitis A Women at increased risk for infection–talk with your healthcare provider 2 doses given 6 months apart   Hepatitis B Women at increased risk for infection–talk with your healthcare provider 3 doses over 6 mon American Academy of Ophthalmology  Cari last reviewed this educational content on 11/1/2017  © 1798-8844 [employer]. All rights reserved. This information is not intended as a substitute for professional medical care.  Always follow your No

## 2024-07-26 NOTE — DISCUSSION/SUMMARY
[FreeTextEntry1] : Amanda feels well from a cardiovascular perspective.  Her major issue seems to be dementia.  Of course she has longstanding type 1 diabetes, with associated diabetic complications.  This certainly put her at risk for cardiovascular issues as well.    I reviewed her most recent blood work from January 2023, revealing a total cholesterol of 151, HDL 79, triglycerides 40, calculated LDL 64.  Echocardiography in September 2018 revealed an ejection fraction of 64%, with age-appropriate valvular calcification, and minimal mitral regurgitation.  Nuclear stress testing in August 2017 revealed no evidence of ischemia, with an exercise capacity of 13 METS.  There were less than 1 mm horizontal ST segment depressions noted, but no perfusion abnormalities.  Carotid ultrasound in February 2017 revealed mild bilateral plaque. Echocardiography was performed March 30, 2023.  This revealed a normal ejection fraction with mild concentric LVH.  There was mild right ventricular enlargement.  There was minimal mitral regurgitation, and no evidence of pulmonary hypertension.  Pharmacologic nuclear stress testing was performed April 7, 2023.  This revealed no evidence of ischemia. Carotid ultrasound was performed March 30, 2023.  This revealed minimal atherosclerosis.

## 2024-07-26 NOTE — ED ADULT TRIAGE NOTE - HEIGHT IN FEET
[facility]    Obstetrics Post-Op / Progress Note    Assessment & Plan   Assessment:  -2 Days Post-Op  Procedure(s):   SECTION, WITH POSTPARTUM TUBAL LIGATION    Doing well.  No immediate surgical complications identified.  Pain well-controlled.    Plan:  Ambulation encouraged  Anticipate discharge tomorrow    Gerry Kim     Interval History   Doing well.  Pain is well-controlled.  No fevers.  No history of wound drainage, warmth or significant erythema.  Good appetite.  Denies chest pain, shortness of breath, nausea or vomiting.  Ambulatory.  Breastfeeding well.    Medications     oxytocin in 0.9% NaCl       oxytocin in 0.9% NaCl         acetaminophen  975 mg Oral Q6H     enoxaparin ANTICOAGULANT  40 mg Subcutaneous Q12H     ibuprofen  800 mg Oral Q6H     senna-docusate  1 tablet Oral BID    Or     senna-docusate  2 tablet Oral BID       Physical Exam   Temp: (!) 96.1  F (35.6  C) Temp src: Temporal BP: 120/78 Pulse: 63   Resp: 16 SpO2: 99 %      There were no vitals filed for this visit.  Vital Signs with Ranges  Temp:  [96.1  F (35.6  C)-97.6  F (36.4  C)] 96.1  F (35.6  C)  Pulse:  [59-66] 63  Resp:  [16] 16  BP: (107-120)/(57-78) 120/78  SpO2:  [97 %-99 %] 99 %  No intake/output data recorded.    Uterine fundus is firm, non-tender and at the level of the umbilicus  Incision C/D/I  Extremities Non-tender    Data   Recent Labs   Lab Test 21  1355   ABO  --  A   RH  --  Neg   AS Positive* Neg     Recent Labs   Lab Test 21  0442 21   HGB 9.7* 11.1*  11.1*     Recent Labs   Lab Test 21  1355   RUQIGG 32        5

## 2024-07-26 NOTE — ED PROVIDER NOTE - NSFOLLOWUPINSTRUCTIONS_ED_ALL_ED_FT
February 7, 2019      Geovani Bustos  [address] [address] [facility] [address]        To Whom It May Concern:        Geovani Bustos was seen in our clinic. He is cleared to play basketball.         Sincerely,            Alexandru Vera MD           Please follow up with you PCP in the next 2 days  Please follow up with endocrine within the next 2 days    Please take medications as prescribed.    Return to the Emergency Department for worsening or persistent symptoms, and/or ANY NEW OR CONCERNING SYMPTOMS. If you have issues obtaining follow up, please call: [phone] or [phone]  to obtain a doctor or specialist who takes your insurance in your area.    Please return to the ED for any new or worsening problems including but not limited to: fever, chills, headache, chest pain, shortness of breath, palpitations, abdominal pain, nausea, vomiting, diarrhea, numbness or weakness.

## 2024-07-26 NOTE — HISTORY OF PRESENT ILLNESS
[FreeTextEntry1] : Amanda Hoyt presented to the office today for a follow-up evaluation.  She was last seen in the office in March, 2023.  She is now 74 years old, with a history of type I diabetes with associated diabetic retinopathy, and neuropathy. Her diabetes was diagnosed as a teenager.  She has a history of hypertension and hypercholesterolemia.  She has been relatively intolerant to statin therapy and has been on Praluent.  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  At the time of her visit in April 2022, she was feeling well apart from some memory issues.  She was undergoing an evaluation by neurology at that time.  I saw her in March, 2023, at which time she was feeling well.  There were concerns about some dementia.  She presents to the office today having been feeling well.  She reports no chest discomfort or shortness of breath suggestive of angina.  She has been exercising, and she feels well with that. She denies orthopnea, PND.  She has noted lower extremity edema recently, not progressive.  She denies palpitations, dizziness and syncope.  She reports that her blood pressure and cholesterol have been well controlled.   Her memory has been worsening, and her significant other, Mateo, believes that she has some borderline dementia.  I recommended several examinations.  Echocardiography was performed March 30, 2023.  This revealed a normal ejection fraction with mild concentric LVH.  There was mild right ventricular enlargement.  There was minimal mitral regurgitation, and no evidence of pulmonary hypertension.  Pharmacologic nuclear stress testing was performed April 7, 2023.  This revealed no evidence of ischemia. Carotid ultrasound was performed March 30, 2023.  This revealed minimal atherosclerosis.  She experienced leg cramping with atorvastatin, Crestor,  and pravastatin which improved with discontinuation. She does have some diabetic peripheral neuropathy by report  evaluated by Dr. Murrell of neurology.   Past medical history is remarkable for menopause at age 49, type 1 diabetes for approximately 45 years complicated by diabetic retinopathy, neuropathy,  hypertension, Graves' Disease, hyperlipidemia, Guillain-Fayette syndrome at age 31, left cataract surgery, right rotator cuff surgery.   She is now retired from teaching

## 2024-07-26 NOTE — ED PROVIDER NOTE - PATIENT PORTAL LINK FT
You can access the FollowMyHealth Patient Portal offered by [facility] by registering at the following website: [email or web address]. By joining [employer]’s FollowMyHealth portal, you will also be able to view your health information using other applications (apps) compatible with our system.

## 2024-07-26 NOTE — ED PROVIDER NOTE - ATTENDING APP SHARED VISIT CONTRIBUTION OF CARE
I have personally seen the patient with the PA/NP/Resident. I agree with their assessment and plan unless otherwise noted. See MDM

## 2024-07-26 NOTE — ED PROVIDER NOTE - DIFFERENTIAL DIAGNOSIS
DDx includes but not limited to: Hyperglycemia VS DKA VS dehydration VS electrolyte abnormality Differential Diagnosis

## 2024-07-26 NOTE — REVIEW OF SYSTEMS
[SOB] : no shortness of breath [Dyspnea on exertion] : not dyspnea during exertion [Leg Claudication] : no intermittent leg claudication [Palpitations] : no palpitations [Syncope] : no syncope [Cough] : no cough [Wheezing] : no wheezing [Negative] : Psychiatric

## 2024-07-26 NOTE — ED ADULT NURSE NOTE - OBJECTIVE STATEMENT
pt presents to the ED c/o hyperglycemia, polyuria and increased thirst a 2 weeks. pt compliant w/ insulin but reports blood sugar has been high lately. aox4, maex4. denies abdominal pain, dysuria, fever, chills. iv lock 20 g placed to rac, patent and flushing. no s/s redness, swelling or infiltration. labs collected and sent. pending results.

## 2024-07-26 NOTE — ED PROVIDER NOTE - OBJECTIVE STATEMENT
Pt is a 74 Y female with past medical history of Graves' disease hyperlipidemia diabetes on insulin hypertension coming in for dizziness which started today.  Patient states spinning sensation no associated vomiting patient states dizziness is better now and is worse when she is moving her head.  Patient checked her sugar and was elevated to 360 so came to emergency room.  Patient denies any chest pain shortness of breath.  Patient is complaining of increased urinary symptoms.  Patient denies any nausea or vomiting chest pain shortness of breath no dysuria no fever no rash no URI symptoms.  Patient states usually her sugars are very well-controlled.

## 2024-07-27 VITALS
TEMPERATURE: 99 F | DIASTOLIC BLOOD PRESSURE: 67 MMHG | SYSTOLIC BLOOD PRESSURE: 124 MMHG | RESPIRATION RATE: 16 BRPM | OXYGEN SATURATION: 99 % | HEART RATE: 78 BPM

## 2024-07-27 PROCEDURE — 82962 GLUCOSE BLOOD TEST: CPT

## 2024-07-27 PROCEDURE — 99285 EMERGENCY DEPT VISIT HI MDM: CPT | Mod: 25

## 2024-07-27 PROCEDURE — 81003 URINALYSIS AUTO W/O SCOPE: CPT

## 2024-07-27 PROCEDURE — 83690 ASSAY OF LIPASE: CPT

## 2024-07-27 PROCEDURE — 93005 ELECTROCARDIOGRAM TRACING: CPT

## 2024-07-27 PROCEDURE — 80053 COMPREHEN METABOLIC PANEL: CPT

## 2024-07-27 PROCEDURE — 85025 COMPLETE CBC W/AUTO DIFF WBC: CPT

## 2024-07-27 PROCEDURE — 82009 KETONE BODYS QUAL: CPT

## 2024-07-27 PROCEDURE — 84484 ASSAY OF TROPONIN QUANT: CPT

## 2024-07-27 PROCEDURE — 87086 URINE CULTURE/COLONY COUNT: CPT

## 2024-07-27 PROCEDURE — 82803 BLOOD GASES ANY COMBINATION: CPT

## 2024-07-27 PROCEDURE — 36415 COLL VENOUS BLD VENIPUNCTURE: CPT

## 2024-07-27 PROCEDURE — 87077 CULTURE AEROBIC IDENTIFY: CPT

## 2024-07-27 PROCEDURE — 71045 X-RAY EXAM CHEST 1 VIEW: CPT

## 2024-07-30 LAB
CULTURE RESULTS: ABNORMAL
SPECIMEN SOURCE: SIGNIFICANT CHANGE UP

## 2024-07-31 ENCOUNTER — APPOINTMENT (OUTPATIENT)
Dept: ENDOCRINOLOGY | Facility: CLINIC | Age: 74
End: 2024-07-31
Payer: MEDICARE

## 2024-07-31 VITALS
WEIGHT: 125 LBS | SYSTOLIC BLOOD PRESSURE: 118 MMHG | HEIGHT: 61 IN | OXYGEN SATURATION: 97 % | DIASTOLIC BLOOD PRESSURE: 70 MMHG | HEART RATE: 64 BPM | BODY MASS INDEX: 23.6 KG/M2

## 2024-07-31 DIAGNOSIS — E10.65 TYPE 1 DIABETES MELLITUS WITH HYPERGLYCEMIA: ICD-10-CM

## 2024-07-31 DIAGNOSIS — E11.319 TYPE 2 DIABETES MELLITUS WITH UNSPECIFIED DIABETIC RETINOPATHY W/OUT MACULAR EDEMA: ICD-10-CM

## 2024-07-31 DIAGNOSIS — I10 ESSENTIAL (PRIMARY) HYPERTENSION: ICD-10-CM

## 2024-07-31 DIAGNOSIS — E78.5 HYPERLIPIDEMIA, UNSPECIFIED: ICD-10-CM

## 2024-07-31 PROCEDURE — 99214 OFFICE O/P EST MOD 30 MIN: CPT

## 2024-07-31 PROCEDURE — 95251 CONT GLUC MNTR ANALYSIS I&R: CPT

## 2024-07-31 PROCEDURE — G2211 COMPLEX E/M VISIT ADD ON: CPT

## 2024-07-31 NOTE — ASSESSMENT
[FreeTextEntry1] : Type 1 DM  On Longterm insulin CGM interpretation     40%Very high   24 %High  31  %TIR   3 %Low      2%very low   Interpretations:  Glucose data was reviewed. Very fluctuating, was high last week then started. Sometimes injects after food as the sugars are low   We agreed on the following plan:  Lower Tresiba to 12 unit at 2PM If the sugars are below than 100 before meals, give only 2 units of Novolog,  Dinner: Novolog 100-150 +3, 151-200 +4, 201-250 +4, >250 +6. Give the insulin before the meals  But if sugars are in the following ranges before meal give the following  Continue to check sugars 3-4 times a day. Continue on healthy diet. Continue to exercise. Please see an eye doctor Please see a foot doctor Set up an appointment with CDE   Celia Jimenez is present 5 days per week and has S.O on weekend to help     HLD On statin Was on praluent in the past  Check LDL   HTN Bp acceptable  On Ace inh/ARB  Hypothyroidism- cont 125 mcg 7 days a week  Initially had Graves, but she was treated for hypothyroidism for long time.   Graves ophthalmopathy - off Tepezza seems stable - to see Neuro ophthalmology   Vit D Def -continue MV    Glucose Sensor Necessity: This patient with diabetes performs 4 or more glucose checks per day. The patient is treated with insulin via 4 injections daily. This patient requires frequent adjustments to their insulin treatment on the basis of checking the fingersticks therefore CGM is warranted.    I spent 40 minutes discussing with patient face to face and non face to face reviewing documentations, labs, and/or imaging, also discussing the management plans.   RTC in 3 months for 4 minutes

## 2024-07-31 NOTE — HISTORY OF PRESENT ILLNESS
[FreeTextEntry1] : Quality: T1DM Severity: moderate Duration: 48 years Onset: leg cramps Modifying Factors: insulin  Went to the hospital for vertigo and high sugars, no steroids, sugars are fluctuating   HgbA1C:8.8% from 3/24  SMBG has CGM  Current drug regimen Tresiba 14 unit at 2PM Novolog 6 -8units TID will often take 2 hrs after meals if  <100 Aide Barbara is present 5 days per week and has S.O on weekend to help often pt is taking mealtime insulin 2 hours post meals  Eye exam: no longer getting tapezza- has prism lens -in 10/23, No DR  Foot exam: Has wound care on 1/18/23 Kidney disease: denies Heart disease: denies  Weight: stable Diet: follows a diabetic diet Exercise; stopped classes Smoking: denies  Graves ophthalmopathy now wearing prisms -has gotten Tapezza in the past Hypothyroidism, likely got DAWKINS in the past, patient does not remember    LT4 125 mcg d 1 tab daily    Vit D Def -On MTV   Memory issue- has memory issues

## 2024-08-01 ENCOUNTER — OUTPATIENT (OUTPATIENT)
Dept: OUTPATIENT SERVICES | Facility: HOSPITAL | Age: 74
LOS: 1 days | Discharge: HOME | End: 2024-08-01
Payer: MEDICARE

## 2024-08-01 ENCOUNTER — APPOINTMENT (OUTPATIENT)
Dept: WOUND CARE | Facility: HOSPITAL | Age: 74
End: 2024-08-01
Payer: MEDICARE

## 2024-08-01 VITALS
RESPIRATION RATE: 18 BRPM | SYSTOLIC BLOOD PRESSURE: 114 MMHG | HEIGHT: 61 IN | HEART RATE: 63 BPM | TEMPERATURE: 97.7 F | WEIGHT: 125 LBS | OXYGEN SATURATION: 98 % | DIASTOLIC BLOOD PRESSURE: 58 MMHG | BODY MASS INDEX: 23.6 KG/M2

## 2024-08-01 DIAGNOSIS — M72.2 PLANTAR FASCIAL FIBROMATOSIS: ICD-10-CM

## 2024-08-01 DIAGNOSIS — E10.40 TYPE 1 DIABETES MELLITUS WITH DIABETIC NEUROPATHY, UNSPECIFIED: ICD-10-CM

## 2024-08-01 DIAGNOSIS — Z98.890 OTHER SPECIFIED POSTPROCEDURAL STATES: Chronic | ICD-10-CM

## 2024-08-01 DIAGNOSIS — M79.673 PAIN IN UNSPECIFIED FOOT: ICD-10-CM

## 2024-08-01 DIAGNOSIS — L60.0 INGROWING NAIL: ICD-10-CM

## 2024-08-01 PROCEDURE — 99213 OFFICE O/P EST LOW 20 MIN: CPT

## 2024-08-01 PROCEDURE — G0463: CPT

## 2024-08-04 PROBLEM — M72.2 PLANTAR FASCIITIS, LEFT: Status: ACTIVE | Noted: 2024-08-04

## 2024-08-04 NOTE — REVIEW OF SYSTEMS
[Fever] : no fever [Chills] : no chills [Red Eyes] : eyes not red [Earache] : no earache [Nosebleeds] : no nosebleeds [Chest Pain] : no chest pain [Shortness Of Breath] : no shortness of breath [Cough] : no cough [Abdominal Pain] : no abdominal pain [Vomiting] : no vomiting [Diarrhea] : no diarrhea [Skin Wound] : no skin wound [Negative] : Heme/Lymph [FreeTextEntry5] : HTN, HLD [de-identified] : Painful ingrown toenail b/l L>R  [de-identified] : memory disturbance  [de-identified] : TYPE I diabetes , neuropathy

## 2024-08-04 NOTE — PHYSICAL EXAM
[0] : left 0 [1+] : left 1+ [Alert] : alert [Oriented to Person] : oriented to person [Oriented to Place] : oriented to place [de-identified] : AOX3  [de-identified] : No POP to the medial heel of the left foot, no pain with ankle joint ROM b/l. Ankle joint DF 4 deg with knee flexed and extended.  4/5 muscle strength for all muscles and tendons crossing the foot and ankle joints, ankle joint and STJ ROM intact b/l. No pain with calf compression b/l. [de-identified] : No more pain on palpation to the medial  nail border of the left hallux  with no edema and erythema, no drainage, no purulence, no proximal streaking  with incurvated medial and lateral border of the hallux b/l, border and hypertrophied lNo labial nail fold appreciated. No pain to the rest of the toenails. Skin bilateral lower extremity is warm to touch with no open wounds or lesions, no HPKs, no bacterial or fungal infection to the rest of the nail. [de-identified] : No open wounds [FreeTextEntry1] : Left Heel Pain [de-identified] : Trae Resendiz [TWNoteComboBox4] : None [TWNoteComboBox5] : No [de-identified] : No [de-identified] : Normal [de-identified] : None [de-identified] : None [de-identified] : None [de-identified] : No [de-identified] : Daily

## 2024-08-04 NOTE — PHYSICAL EXAM
[0] : left 0 [1+] : left 1+ [Alert] : alert [Oriented to Person] : oriented to person [Oriented to Place] : oriented to place [de-identified] : AOX3  [de-identified] : No POP to the medial heel of the left foot, no pain with ankle joint ROM b/l. Ankle joint DF 4 deg with knee flexed and extended.  4/5 muscle strength for all muscles and tendons crossing the foot and ankle joints, ankle joint and STJ ROM intact b/l. No pain with calf compression b/l. [de-identified] : No more pain on palpation to the medial  nail border of the left hallux  with no edema and erythema, no drainage, no purulence, no proximal streaking  with incurvated medial and lateral border of the hallux b/l, border and hypertrophied lNo labial nail fold appreciated. No pain to the rest of the toenails. Skin bilateral lower extremity is warm to touch with no open wounds or lesions, no HPKs, no bacterial or fungal infection to the rest of the nail. [de-identified] : No open wounds [FreeTextEntry1] : Left Heel Pain [de-identified] : Trae Resendiz [TWNoteComboBox4] : None [TWNoteComboBox5] : No [de-identified] : No [de-identified] : Normal [de-identified] : None [de-identified] : None [de-identified] : None [de-identified] : No [de-identified] : Daily

## 2024-08-04 NOTE — REVIEW OF SYSTEMS
[Fever] : no fever [Chills] : no chills [Red Eyes] : eyes not red [Earache] : no earache [Nosebleeds] : no nosebleeds [Chest Pain] : no chest pain [Shortness Of Breath] : no shortness of breath [Cough] : no cough [Abdominal Pain] : no abdominal pain [Vomiting] : no vomiting [Diarrhea] : no diarrhea [Skin Wound] : no skin wound [Negative] : Heme/Lymph [FreeTextEntry5] : HTN, HLD [de-identified] : Painful ingrown toenail b/l L>R  [de-identified] : memory disturbance  [de-identified] : TYPE I diabetes , neuropathy

## 2024-08-04 NOTE — ASSESSMENT
[Verbal] : Verbal [Written] : Written [Demo] : Demo [Patient] : Patient [Good - alert, interested, motivated] : Good - alert, interested, motivated [Verbalizes knowledge/Understanding] : Verbalizes knowledge/understanding [Dressing changes] : dressing changes [Foot Care] : foot care [Skin Care] : skin care [Signs and symptoms of infection] : sign and symptoms of infection [Nutrition] : nutrition [How and When to Call] : how and when to call [Pain Management] : pain management [Patient responsibility to plan of care] : patient responsibility to plan of care [Stable] : stable [Home] : Home [Ambulatory] : Ambulatory [Not Applicable - Long Term Care/Home Health Agency] : Long Term Care/Home Health Agency: Not Applicable [Glycemic Control] : glycemic control [] : No [FreeTextEntry2] : infection prevention promote skin integrity glycemic control demonstrates use of both pharmacological and non pharmacological pain management interventions vascular testing   [FreeTextEntry3] : No Open Wounds [FreeTextEntry4] : Patient advised to perform stretching exercises demonstrated by DPM a few times daily, apply bag balm to heels daily. Make an appointment at Podiatry clinic for routine nail care Follow up at [facility] in 2-3 months as needed.

## 2024-08-04 NOTE — PLAN
[FreeTextEntry1] : .Patient seen and evaluated. Discussed etiology and treatment plan. Patient verbalized understanding.Discussed with patient to wear supportive shoe gear with wide toed shoes and extra depth in toe box to accommodate the deformity.  Advised regarding posterior calf muscle stretching 3-5 times a day. Patient to f/u at the podiatry clinic for routine foot check. Return to wound care center as needed.  Spent 20 minutes for patient care and medical decision making.

## 2024-08-04 NOTE — REVIEW OF SYSTEMS
[Fever] : no fever [Chills] : no chills [Red Eyes] : eyes not red [Earache] : no earache [Nosebleeds] : no nosebleeds [Chest Pain] : no chest pain [Shortness Of Breath] : no shortness of breath [Cough] : no cough [Abdominal Pain] : no abdominal pain [Vomiting] : no vomiting [Diarrhea] : no diarrhea [Skin Wound] : no skin wound [Negative] : Heme/Lymph [FreeTextEntry5] : HTN, HLD [de-identified] : Painful ingrown toenail b/l L>R  [de-identified] : memory disturbance  [de-identified] : TYPE I diabetes , neuropathy

## 2024-08-04 NOTE — HISTORY OF PRESENT ILLNESS
[FreeTextEntry1] : Pt is a 74 yr. old female seen for f/u for painful ingrown toe nails to the great toes. Patient is accompanied by her aid and denies any pain to the great toe at this time. Patient new onset heel pain to the left heel when standing and walking. Denies any trauma. Patient relates she experiences the pain mainly with standing after sitting for an extended period of time

## 2024-08-04 NOTE — PHYSICAL EXAM
[0] : left 0 [1+] : left 1+ [Alert] : alert [Oriented to Person] : oriented to person [Oriented to Place] : oriented to place [de-identified] : AOX3  [de-identified] : No POP to the medial heel of the left foot, no pain with ankle joint ROM b/l. Ankle joint DF 4 deg with knee flexed and extended.  4/5 muscle strength for all muscles and tendons crossing the foot and ankle joints, ankle joint and STJ ROM intact b/l. No pain with calf compression b/l. [de-identified] : No more pain on palpation to the medial  nail border of the left hallux  with no edema and erythema, no drainage, no purulence, no proximal streaking  with incurvated medial and lateral border of the hallux b/l, border and hypertrophied lNo labial nail fold appreciated. No pain to the rest of the toenails. Skin bilateral lower extremity is warm to touch with no open wounds or lesions, no HPKs, no bacterial or fungal infection to the rest of the nail. [de-identified] : No open wounds [FreeTextEntry1] : Left Heel Pain [de-identified] : Trae Resendiz [TWNoteComboBox4] : None [TWNoteComboBox5] : No [de-identified] : No [de-identified] : Normal [de-identified] : None [de-identified] : None [de-identified] : None [de-identified] : No [de-identified] : Daily

## 2024-08-04 NOTE — ASSESSMENT
[Verbal] : Verbal [Written] : Written [Demo] : Demo [Patient] : Patient [Good - alert, interested, motivated] : Good - alert, interested, motivated [Verbalizes knowledge/Understanding] : Verbalizes knowledge/understanding [Dressing changes] : dressing changes [Foot Care] : foot care [Skin Care] : skin care [Signs and symptoms of infection] : sign and symptoms of infection [Nutrition] : nutrition [How and When to Call] : how and when to call [Pain Management] : pain management [Patient responsibility to plan of care] : patient responsibility to plan of care [Stable] : stable [Home] : Home [Ambulatory] : Ambulatory [Not Applicable - Long Term Care/Home Health Agency] : Long Term Care/Home Health Agency: Not Applicable [Glycemic Control] : glycemic control [] : No [FreeTextEntry2] : infection prevention promote skin integrity glycemic control demonstrates use of both pharmacological and non pharmacological pain management interventions vascular testing   [FreeTextEntry3] : No Open Wounds [FreeTextEntry4] : Patient advised to perform stretching exercises demonstrated by DPM a few times daily, apply bag balm to heels daily. Make an appointment at Podiatry clinic for routine nail care Follow up at [facility] in 2-3 months as needed.

## 2024-08-04 NOTE — ASSESSMENT
[Verbal] : Verbal [Written] : Written [Demo] : Demo [Patient] : Patient [Good - alert, interested, motivated] : Good - alert, interested, motivated [Verbalizes knowledge/Understanding] : Verbalizes knowledge/understanding [Dressing changes] : dressing changes [Foot Care] : foot care [Skin Care] : skin care [Signs and symptoms of infection] : sign and symptoms of infection [Nutrition] : nutrition [How and When to Call] : how and when to call [Pain Management] : pain management [Patient responsibility to plan of care] : patient responsibility to plan of care [Stable] : stable [Home] : Home [Ambulatory] : Ambulatory [Not Applicable - Long Term Care/Home Health Agency] : Long Term Care/Home Health Agency: Not Applicable [Glycemic Control] : glycemic control [] : No [FreeTextEntry2] : infection prevention promote skin integrity glycemic control demonstrates use of both pharmacological and non pharmacological pain management interventions vascular testing   [FreeTextEntry3] : No Open Wounds [FreeTextEntry4] : Patient advised to perform stretching exercises demonstrated by DPM a few times daily, apply bag balm to heels daily. Make an appointment at Podiatry clinic for routine nail care Follow up at [facility] in 2-3 months as needed.

## 2024-08-05 ENCOUNTER — NON-APPOINTMENT (OUTPATIENT)
Age: 74
End: 2024-08-05

## 2024-08-05 DIAGNOSIS — E55.9 VITAMIN D DEFICIENCY, UNSPECIFIED: ICD-10-CM

## 2024-08-05 DIAGNOSIS — D64.9 ANEMIA, UNSPECIFIED: ICD-10-CM

## 2024-08-05 DIAGNOSIS — L60.0 INGROWING NAIL: ICD-10-CM

## 2024-08-05 DIAGNOSIS — E78.5 HYPERLIPIDEMIA, UNSPECIFIED: ICD-10-CM

## 2024-08-05 DIAGNOSIS — Z79.4 LONG TERM (CURRENT) USE OF INSULIN: ICD-10-CM

## 2024-08-05 DIAGNOSIS — E05.00 THYROTOXICOSIS WITH DIFFUSE GOITER WITHOUT THYROTOXIC CRISIS OR STORM: ICD-10-CM

## 2024-08-05 DIAGNOSIS — E03.9 HYPOTHYROIDISM, UNSPECIFIED: ICD-10-CM

## 2024-08-05 DIAGNOSIS — F32.5 MAJOR DEPRESSIVE DISORDER, SINGLE EPISODE, IN FULL REMISSION: ICD-10-CM

## 2024-08-05 DIAGNOSIS — Z79.82 LONG TERM (CURRENT) USE OF ASPIRIN: ICD-10-CM

## 2024-08-05 DIAGNOSIS — Z79.899 OTHER LONG TERM (CURRENT) DRUG THERAPY: ICD-10-CM

## 2024-08-05 DIAGNOSIS — Z98.49 CATARACT EXTRACTION STATUS, UNSPECIFIED EYE: ICD-10-CM

## 2024-08-05 DIAGNOSIS — Z98.890 OTHER SPECIFIED POSTPROCEDURAL STATES: ICD-10-CM

## 2024-08-05 DIAGNOSIS — E10.40 TYPE 1 DIABETES MELLITUS WITH DIABETIC NEUROPATHY, UNSPECIFIED: ICD-10-CM

## 2024-08-05 DIAGNOSIS — I10 ESSENTIAL (PRIMARY) HYPERTENSION: ICD-10-CM

## 2024-08-05 DIAGNOSIS — Z79.890 HORMONE REPLACEMENT THERAPY: ICD-10-CM

## 2024-08-05 DIAGNOSIS — Z82.49 FAMILY HISTORY OF ISCHEMIC HEART DISEASE AND OTHER DISEASES OF THE CIRCULATORY SYSTEM: ICD-10-CM

## 2024-08-05 DIAGNOSIS — Z87.440 PERSONAL HISTORY OF URINARY (TRACT) INFECTIONS: ICD-10-CM

## 2024-08-06 LAB
ALBUMIN SERPL ELPH-MCNC: 3.9 G/DL
ALP BLD-CCNC: 111 U/L
ALT SERPL-CCNC: 20 U/L
ANION GAP SERPL CALC-SCNC: 10 MMOL/L
AST SERPL-CCNC: 20 U/L
BILIRUB SERPL-MCNC: 0.2 MG/DL
BUN SERPL-MCNC: 27 MG/DL
CALCIUM SERPL-MCNC: 9.2 MG/DL
CHLORIDE SERPL-SCNC: 103 MMOL/L
CHOLEST SERPL-MCNC: 154 MG/DL
CO2 SERPL-SCNC: 28 MMOL/L
CREAT SERPL-MCNC: 0.76 MG/DL
CREAT SPEC-SCNC: 64 MG/DL
EGFR: 81 ML/MIN/1.73M2
ESTIMATED AVERAGE GLUCOSE: 206 MG/DL
GLUCOSE SERPL-MCNC: 128 MG/DL
HBA1C MFR BLD HPLC: 8.8 %
HDLC SERPL-MCNC: 86 MG/DL
LDLC SERPL CALC-MCNC: 57 MG/DL
MICROALBUMIN 24H UR DL<=1MG/L-MCNC: <1.2 MG/DL
MICROALBUMIN/CREAT 24H UR-RTO: NORMAL MG/G
NONHDLC SERPL-MCNC: 68 MG/DL
POTASSIUM SERPL-SCNC: 4.1 MMOL/L
PROT SERPL-MCNC: 6 G/DL
SODIUM SERPL-SCNC: 140 MMOL/L
TRIGL SERPL-MCNC: 54 MG/DL
TSH SERPL-ACNC: 0.72 UIU/ML

## 2024-08-21 ENCOUNTER — APPOINTMENT (OUTPATIENT)
Dept: INTERNAL MEDICINE | Facility: CLINIC | Age: 74
End: 2024-08-21

## 2024-08-21 ENCOUNTER — APPOINTMENT (OUTPATIENT)
Dept: INTERNAL MEDICINE | Facility: CLINIC | Age: 74
End: 2024-08-21
Payer: MEDICARE

## 2024-08-21 VITALS
HEIGHT: 61 IN | TEMPERATURE: 97.5 F | WEIGHT: 130 LBS | HEART RATE: 63 BPM | SYSTOLIC BLOOD PRESSURE: 120 MMHG | RESPIRATION RATE: 16 BRPM | DIASTOLIC BLOOD PRESSURE: 54 MMHG | OXYGEN SATURATION: 97 % | BODY MASS INDEX: 24.55 KG/M2

## 2024-08-21 DIAGNOSIS — R42 DIZZINESS AND GIDDINESS: ICD-10-CM

## 2024-08-21 PROCEDURE — 99214 OFFICE O/P EST MOD 30 MIN: CPT

## 2024-08-21 RX ORDER — MECLIZINE HYDROCHLORIDE 12.5 MG/1
12.5 TABLET ORAL 3 TIMES DAILY
Qty: 30 | Refills: 0 | Status: ACTIVE | COMMUNITY
Start: 2024-08-21 | End: 1900-01-01

## 2024-08-21 NOTE — PHYSICAL EXAM
[No Acute Distress] : no acute distress [Normal Sclera/Conjunctiva] : normal sclera/conjunctiva [PERRL] : pupils equal round and reactive to light [EOMI] : extraocular movements intact [No Respiratory Distress] : no respiratory distress  [No Accessory Muscle Use] : no accessory muscle use [Clear to Auscultation] : lungs were clear to auscultation bilaterally [Normal Rate] : normal rate  [Regular Rhythm] : with a regular rhythm [Normal S1, S2] : normal S1 and S2 [Soft] : abdomen soft [Non Tender] : non-tender [Non-distended] : non-distended [Normal Bowel Sounds] : normal bowel sounds [Grossly Normal Strength/Tone] : grossly normal strength/tone [No Focal Deficits] : no focal deficits [Normal Gait] : normal gait [Normal Affect] : the affect was normal [Normal Insight/Judgement] : insight and judgment were intact

## 2024-08-21 NOTE — ASSESSMENT
[FreeTextEntry1] : Vertigo: Discussed meclizine 12.5mg TID PRN. Discussed MRI head, MRA head, MRA neck. Referred to ENT.

## 2024-08-21 NOTE — HISTORY OF PRESENT ILLNESS
[FreeTextEntry8] : 74F presents for multiple episodes of vertigo. She had a prolonged episode in July and had an episode yesterday. She reports spinning and unsteadiness. Episodes resolved on their own but took longer than a few minutes. Denies weakness in extremities.

## 2024-08-23 PROBLEM — L60.0 INGROWN TOENAIL: Status: ACTIVE | Noted: 2024-08-23

## 2024-09-09 ENCOUNTER — APPOINTMENT (OUTPATIENT)
Dept: OTOLARYNGOLOGY | Facility: CLINIC | Age: 74
End: 2024-09-09
Payer: MEDICARE

## 2024-09-09 ENCOUNTER — NON-APPOINTMENT (OUTPATIENT)
Age: 74
End: 2024-09-09

## 2024-09-09 VITALS
WEIGHT: 128.03 LBS | BODY MASS INDEX: 23.56 KG/M2 | DIASTOLIC BLOOD PRESSURE: 61 MMHG | HEART RATE: 60 BPM | HEIGHT: 62 IN | SYSTOLIC BLOOD PRESSURE: 119 MMHG

## 2024-09-09 DIAGNOSIS — R42 DIZZINESS AND GIDDINESS: ICD-10-CM

## 2024-09-09 PROCEDURE — 99204 OFFICE O/P NEW MOD 45 MIN: CPT

## 2024-09-09 PROCEDURE — 92567 TYMPANOMETRY: CPT

## 2024-09-09 PROCEDURE — 92557 COMPREHENSIVE HEARING TEST: CPT

## 2024-09-09 NOTE — CONSULT LETTER
[Dear  ___] : Dear  [unfilled], [Consult Letter:] : I had the pleasure of evaluating your patient, [unfilled]. [Please see my note below.] : Please see my note below. [Consult Closing:] : Thank you very much for allowing me to participate in the care of this patient.  If you have any questions, please do not hesitate to contact me. [Sincerely,] : Sincerely, [FreeTextEntry3] : Kostas Olson M.D.   [place] [address] T [phone] F [phone]

## 2024-09-09 NOTE — PHYSICAL EXAM
[Midline] : trachea located in midline position [Normal] : orientation to person, place, and time: normal [] : Romberg test is negative [de-identified] : very unsteady

## 2024-09-09 NOTE — ASSESSMENT
[FreeTextEntry1] : MRI brain, MRA brain and neck pending   borderline WNL sloping to mod-severe SNHL w type A AU VNG f/u after testing is complete

## 2024-09-09 NOTE — HISTORY OF PRESENT ILLNESS
"Anesthesia Transfer of Care Note    Patient: Vero Frank    Procedure(s) Performed: Procedure(s) (LRB):  SALPINGECTOMY, LAPAROSCOPIC (Bilateral)    Patient location: PACU    Anesthesia Type: general    Transport from OR: Transported from OR on room air with adequate spontaneous ventilation    Post pain: adequate analgesia    Post assessment: no apparent anesthetic complications    Post vital signs: stable    Level of consciousness: awake, alert and oriented    Nausea/Vomiting: no nausea/vomiting    Complications: none    Transfer of care protocol was followed      Last vitals:   Visit Vitals  /66   Pulse (!) 53   Temp 36.1 °C (97 °F) (Temporal)   Resp 18   Ht 5' 7" (1.702 m)   Wt 105.4 kg (232 lb 7.6 oz)   LMP 04/12/2019 (Approximate)   SpO2 100%   Breastfeeding? Yes   BMI 36.41 kg/m²     "
[de-identified] : 74 yr old female w recurrent vertigo since July, recurs a couple of times/month. had a single episode in 2023 seen with her aide +relief w meclizine most recent 9/6 it lasts few minutes to an hour occ tinnitus denies aural fullness Dr Sanderson ordered brain MRI, MRA brain and carotids, appointments are pending  usually occurs when she gets out of bed, last week occurred during PT while getting up from the table  aided AU, has been told there's an asymmetry, but she's not sure which ear +early dementia -hx otitis, noise exp, head trauma, FH

## 2024-09-23 ENCOUNTER — APPOINTMENT (OUTPATIENT)
Dept: PODIATRY | Facility: CLINIC | Age: 74
End: 2024-09-23

## 2024-10-03 ENCOUNTER — APPOINTMENT (OUTPATIENT)
Dept: ENDOCRINOLOGY | Facility: CLINIC | Age: 74
End: 2024-10-03
Payer: MEDICARE

## 2024-10-03 DIAGNOSIS — E10.9 TYPE 1 DIABETES MELLITUS W/OUT COMPLICATIONS: ICD-10-CM

## 2024-10-03 PROCEDURE — 97803 MED NUTRITION INDIV SUBSEQ: CPT | Mod: GA

## 2024-10-03 NOTE — ASSESSMENT
[Verbal] : Verbal [Written] : Written [Demo] : Demo [Patient] : Patient [Good - alert, interested, motivated] : Good - alert, interested, motivated [Verbalizes knowledge/Understanding] : Verbalizes knowledge/understanding [Dressing changes] : dressing changes [Foot Care] : foot care [Skin Care] : skin care [Pressure relief] : pressure relief [Signs and symptoms of infection] : sign and symptoms of infection [Venous Disease] : venous disease [Nutrition] : nutrition [Arterial Disease] : arterial disease [How and When to Call] : how and when to call [Labs and Tests] : labs and tests [Pain Management] : pain management [Off-loading] : off-loading [Patient responsibility to plan of care] : patient responsibility to plan of care [Glycemic Control] : glycemic control [] : Yes [Stable] : stable [Home] : Home [Ambulatory] : Ambulatory [Not Applicable - Long Term Care/Home Health Agency] : Long Term Care/Home Health Agency: Not Applicable [FreeTextEntry2] : infection prevention promote skin integrity vascular testing encourage glycemic control daily foot checks  [FreeTextEntry3] : initial visit [FreeTextEntry4] : F/U 2 weeks DPM ordered xray of pt left foot, pt went to [facility] radiology after this visit auth for vascular studies submitted and vascular consult pt to bring HCP forms next visit Pt educated on good skin care and glycemic control, pt advised to purchase bag balm to reduce dry skin, pt HHA to perform dressing changes, pt had adequate supplies at this time.  Pt sees an endocrinologist routinely for diabetes management None

## 2024-10-09 ENCOUNTER — APPOINTMENT (OUTPATIENT)
Dept: CARDIOLOGY | Facility: CLINIC | Age: 74
End: 2024-10-09

## 2024-10-10 ENCOUNTER — APPOINTMENT (OUTPATIENT)
Dept: CARDIOLOGY | Facility: CLINIC | Age: 74
End: 2024-10-10

## 2024-10-10 ENCOUNTER — NON-APPOINTMENT (OUTPATIENT)
Age: 74
End: 2024-10-10

## 2024-10-10 VITALS
HEART RATE: 59 BPM | SYSTOLIC BLOOD PRESSURE: 137 MMHG | WEIGHT: 129 LBS | HEIGHT: 62 IN | OXYGEN SATURATION: 99 % | DIASTOLIC BLOOD PRESSURE: 70 MMHG | BODY MASS INDEX: 23.74 KG/M2

## 2024-10-10 DIAGNOSIS — I51.7 CARDIOMEGALY: ICD-10-CM

## 2024-10-10 DIAGNOSIS — R42 DIZZINESS AND GIDDINESS: ICD-10-CM

## 2024-10-10 DIAGNOSIS — H81.10 BENIGN PAROXYSMAL VERTIGO, UNSPECIFIED EAR: ICD-10-CM

## 2024-10-10 PROCEDURE — 93000 ELECTROCARDIOGRAM COMPLETE: CPT

## 2024-10-10 PROCEDURE — 99214 OFFICE O/P EST MOD 30 MIN: CPT

## 2024-10-10 PROCEDURE — G2211 COMPLEX E/M VISIT ADD ON: CPT

## 2024-10-15 ENCOUNTER — APPOINTMENT (OUTPATIENT)
Dept: MRI IMAGING | Facility: CLINIC | Age: 74
End: 2024-10-15
Payer: MEDICARE

## 2024-10-15 PROCEDURE — 70551 MRI BRAIN STEM W/O DYE: CPT

## 2024-10-15 PROCEDURE — 70544 MR ANGIOGRAPHY HEAD W/O DYE: CPT | Mod: XU

## 2024-10-15 PROCEDURE — 70547 MR ANGIOGRAPHY NECK W/O DYE: CPT

## 2024-10-17 ENCOUNTER — APPOINTMENT (OUTPATIENT)
Dept: WOUND CARE | Facility: HOSPITAL | Age: 74
End: 2024-10-17
Payer: MEDICARE

## 2024-10-17 ENCOUNTER — OUTPATIENT (OUTPATIENT)
Dept: OUTPATIENT SERVICES | Facility: HOSPITAL | Age: 74
LOS: 1 days | Discharge: HOME | End: 2024-10-17
Payer: MEDICARE

## 2024-10-17 VITALS
SYSTOLIC BLOOD PRESSURE: 171 MMHG | BODY MASS INDEX: 23.74 KG/M2 | HEART RATE: 54 BPM | WEIGHT: 129 LBS | RESPIRATION RATE: 18 BRPM | TEMPERATURE: 98.4 F | DIASTOLIC BLOOD PRESSURE: 74 MMHG | HEIGHT: 62 IN | OXYGEN SATURATION: 99 %

## 2024-10-17 DIAGNOSIS — L60.0 INGROWING NAIL: ICD-10-CM

## 2024-10-17 DIAGNOSIS — Z98.890 OTHER SPECIFIED POSTPROCEDURAL STATES: Chronic | ICD-10-CM

## 2024-10-17 DIAGNOSIS — E10.40 TYPE 1 DIABETES MELLITUS WITH DIABETIC NEUROPATHY, UNSPECIFIED: ICD-10-CM

## 2024-10-17 PROCEDURE — 99213 OFFICE O/P EST LOW 20 MIN: CPT

## 2024-10-21 ENCOUNTER — APPOINTMENT (OUTPATIENT)
Dept: CARDIOLOGY | Facility: CLINIC | Age: 74
End: 2024-10-21
Payer: MEDICARE

## 2024-10-21 PROCEDURE — 93880 EXTRACRANIAL BILAT STUDY: CPT

## 2024-10-21 PROCEDURE — 93306 TTE W/DOPPLER COMPLETE: CPT

## 2024-10-22 ENCOUNTER — APPOINTMENT (OUTPATIENT)
Dept: ENDOCRINOLOGY | Facility: CLINIC | Age: 74
End: 2024-10-22
Payer: MEDICARE

## 2024-10-22 VITALS
HEIGHT: 62 IN | OXYGEN SATURATION: 96 % | HEART RATE: 69 BPM | BODY MASS INDEX: 24.11 KG/M2 | DIASTOLIC BLOOD PRESSURE: 44 MMHG | SYSTOLIC BLOOD PRESSURE: 134 MMHG | WEIGHT: 131 LBS

## 2024-10-22 DIAGNOSIS — E03.9 HYPOTHYROIDISM, UNSPECIFIED: ICD-10-CM

## 2024-10-22 DIAGNOSIS — E78.5 HYPERLIPIDEMIA, UNSPECIFIED: ICD-10-CM

## 2024-10-22 DIAGNOSIS — I10 ESSENTIAL (PRIMARY) HYPERTENSION: ICD-10-CM

## 2024-10-22 DIAGNOSIS — E11.319 TYPE 2 DIABETES MELLITUS WITH UNSPECIFIED DIABETIC RETINOPATHY W/OUT MACULAR EDEMA: ICD-10-CM

## 2024-10-22 PROCEDURE — 99214 OFFICE O/P EST MOD 30 MIN: CPT

## 2024-10-22 PROCEDURE — G2211 COMPLEX E/M VISIT ADD ON: CPT

## 2024-10-24 DIAGNOSIS — E05.00 THYROTOXICOSIS WITH DIFFUSE GOITER WITHOUT THYROTOXIC CRISIS OR STORM: ICD-10-CM

## 2024-10-24 DIAGNOSIS — Z79.899 OTHER LONG TERM (CURRENT) DRUG THERAPY: ICD-10-CM

## 2024-10-24 DIAGNOSIS — Z79.82 LONG TERM (CURRENT) USE OF ASPIRIN: ICD-10-CM

## 2024-10-24 DIAGNOSIS — I10 ESSENTIAL (PRIMARY) HYPERTENSION: ICD-10-CM

## 2024-10-24 DIAGNOSIS — E03.9 HYPOTHYROIDISM, UNSPECIFIED: ICD-10-CM

## 2024-10-24 DIAGNOSIS — L60.0 INGROWING NAIL: ICD-10-CM

## 2024-10-24 DIAGNOSIS — D64.9 ANEMIA, UNSPECIFIED: ICD-10-CM

## 2024-10-24 DIAGNOSIS — E55.9 VITAMIN D DEFICIENCY, UNSPECIFIED: ICD-10-CM

## 2024-10-24 DIAGNOSIS — Z98.890 OTHER SPECIFIED POSTPROCEDURAL STATES: ICD-10-CM

## 2024-10-24 DIAGNOSIS — Z98.49 CATARACT EXTRACTION STATUS, UNSPECIFIED EYE: ICD-10-CM

## 2024-10-24 DIAGNOSIS — E78.5 HYPERLIPIDEMIA, UNSPECIFIED: ICD-10-CM

## 2024-10-24 DIAGNOSIS — Z79.4 LONG TERM (CURRENT) USE OF INSULIN: ICD-10-CM

## 2024-10-24 DIAGNOSIS — Z82.49 FAMILY HISTORY OF ISCHEMIC HEART DISEASE AND OTHER DISEASES OF THE CIRCULATORY SYSTEM: ICD-10-CM

## 2024-10-24 DIAGNOSIS — Z87.440 PERSONAL HISTORY OF URINARY (TRACT) INFECTIONS: ICD-10-CM

## 2024-10-24 DIAGNOSIS — Z79.890 HORMONE REPLACEMENT THERAPY: ICD-10-CM

## 2024-10-24 DIAGNOSIS — E10.40 TYPE 1 DIABETES MELLITUS WITH DIABETIC NEUROPATHY, UNSPECIFIED: ICD-10-CM

## 2024-10-25 ENCOUNTER — NON-APPOINTMENT (OUTPATIENT)
Age: 74
End: 2024-10-25

## 2024-10-25 LAB
25(OH)D3 SERPL-MCNC: 56 NG/ML
ALBUMIN SERPL ELPH-MCNC: 4.1 G/DL
ALP BLD-CCNC: 87 U/L
ALT SERPL-CCNC: 18 U/L
ANION GAP SERPL CALC-SCNC: 14 MMOL/L
AST SERPL-CCNC: 24 U/L
BILIRUB SERPL-MCNC: 0.3 MG/DL
BUN SERPL-MCNC: 15 MG/DL
CALCIUM SERPL-MCNC: 9.5 MG/DL
CHLORIDE SERPL-SCNC: 103 MMOL/L
CHOLEST SERPL-MCNC: 204 MG/DL
CO2 SERPL-SCNC: 26 MMOL/L
CREAT SERPL-MCNC: 0.72 MG/DL
CREAT SPEC-SCNC: 115 MG/DL
EGFR: 87 ML/MIN/1.73M2
ESTIMATED AVERAGE GLUCOSE: 240 MG/DL
GLUCOSE SERPL-MCNC: 101 MG/DL
HBA1C MFR BLD HPLC: 10 %
HDLC SERPL-MCNC: 80 MG/DL
LDLC SERPL CALC-MCNC: 111 MG/DL
MICROALBUMIN 24H UR DL<=1MG/L-MCNC: 2.2 MG/DL
MICROALBUMIN/CREAT 24H UR-RTO: 19 MG/G
NONHDLC SERPL-MCNC: 124 MG/DL
POTASSIUM SERPL-SCNC: 4.5 MMOL/L
PROT SERPL-MCNC: 6.5 G/DL
SODIUM SERPL-SCNC: 144 MMOL/L
T4 FREE SERPL-MCNC: 1.3 NG/DL
TRIGL SERPL-MCNC: 77 MG/DL
TSH SERPL-ACNC: 3.24 UIU/ML

## 2024-10-30 NOTE — ED ADULT NURSE NOTE - CAS DISCH CONDITION
chest wall non-tender, breathing is unlabored without accessory muscle use, normal breath sounds Stable

## 2024-10-31 ENCOUNTER — NON-APPOINTMENT (OUTPATIENT)
Age: 74
End: 2024-10-31

## 2024-10-31 ENCOUNTER — APPOINTMENT (OUTPATIENT)
Dept: ENDOCRINOLOGY | Facility: CLINIC | Age: 74
End: 2024-10-31
Payer: MEDICARE

## 2024-10-31 DIAGNOSIS — E10.65 TYPE 1 DIABETES MELLITUS WITH HYPERGLYCEMIA: ICD-10-CM

## 2024-10-31 DIAGNOSIS — E10.9 TYPE 1 DIABETES MELLITUS W/OUT COMPLICATIONS: ICD-10-CM

## 2024-10-31 PROCEDURE — 97803 MED NUTRITION INDIV SUBSEQ: CPT | Mod: GA

## 2024-11-20 ENCOUNTER — APPOINTMENT (OUTPATIENT)
Dept: OTOLARYNGOLOGY | Facility: CLINIC | Age: 74
End: 2024-11-20
Payer: MEDICARE

## 2024-11-20 PROCEDURE — 92540 BASIC VESTIBULAR EVALUATION: CPT

## 2024-11-20 PROCEDURE — 92537 CALORIC VSTBLR TEST W/REC: CPT

## 2024-11-20 PROCEDURE — G0463: CPT

## 2024-11-25 ENCOUNTER — APPOINTMENT (OUTPATIENT)
Dept: OTOLARYNGOLOGY | Facility: CLINIC | Age: 74
End: 2024-11-25
Payer: MEDICARE

## 2024-11-25 VITALS
WEIGHT: 130.13 LBS | HEIGHT: 60.83 IN | HEART RATE: 61 BPM | TEMPERATURE: 98.3 F | SYSTOLIC BLOOD PRESSURE: 125 MMHG | BODY MASS INDEX: 24.57 KG/M2 | DIASTOLIC BLOOD PRESSURE: 68 MMHG

## 2024-11-25 DIAGNOSIS — H81.12 BENIGN PAROXYSMAL VERTIGO, LEFT EAR: ICD-10-CM

## 2024-11-25 PROCEDURE — 99214 OFFICE O/P EST MOD 30 MIN: CPT

## 2024-12-02 ENCOUNTER — APPOINTMENT (OUTPATIENT)
Dept: PODIATRY | Facility: CLINIC | Age: 74
End: 2024-12-02

## 2024-12-02 VITALS
HEIGHT: 60 IN | BODY MASS INDEX: 25.52 KG/M2 | DIASTOLIC BLOOD PRESSURE: 67 MMHG | SYSTOLIC BLOOD PRESSURE: 157 MMHG | HEART RATE: 63 BPM | TEMPERATURE: 97.3 F | OXYGEN SATURATION: 97 % | WEIGHT: 130 LBS

## 2024-12-02 PROCEDURE — 11721 DEBRIDE NAIL 6 OR MORE: CPT

## 2024-12-12 ENCOUNTER — APPOINTMENT (OUTPATIENT)
Dept: ENDOCRINOLOGY | Facility: CLINIC | Age: 74
End: 2024-12-12

## 2025-01-23 NOTE — ED ADULT NURSE NOTE - NURSING NEURO STROKE SYMPTOMS
A (CATHETER OD4 FR L65 CM WaluziOPQ FLUSH HI TORQUE SHAFT OMNI CRV) catheter was used to selectively engage and inject the abdominal aorta by power injection. dizziness

## 2025-02-03 ENCOUNTER — APPOINTMENT (OUTPATIENT)
Dept: WOUND CARE | Facility: HOSPITAL | Age: 75
End: 2025-02-03
Payer: MEDICARE

## 2025-02-03 ENCOUNTER — OUTPATIENT (OUTPATIENT)
Dept: OUTPATIENT SERVICES | Facility: HOSPITAL | Age: 75
LOS: 1 days | Discharge: HOME | End: 2025-02-03
Payer: MEDICARE

## 2025-02-03 ENCOUNTER — APPOINTMENT (OUTPATIENT)
Dept: ENDOCRINOLOGY | Facility: CLINIC | Age: 75
End: 2025-02-03
Payer: MEDICARE

## 2025-02-03 VITALS
HEART RATE: 67 BPM | DIASTOLIC BLOOD PRESSURE: 70 MMHG | SYSTOLIC BLOOD PRESSURE: 140 MMHG | BODY MASS INDEX: 25.52 KG/M2 | WEIGHT: 130 LBS | HEIGHT: 60 IN | OXYGEN SATURATION: 97 %

## 2025-02-03 VITALS
HEIGHT: 60 IN | OXYGEN SATURATION: 97 % | HEART RATE: 61 BPM | TEMPERATURE: 98.3 F | SYSTOLIC BLOOD PRESSURE: 150 MMHG | BODY MASS INDEX: 25.52 KG/M2 | WEIGHT: 130 LBS | DIASTOLIC BLOOD PRESSURE: 69 MMHG | RESPIRATION RATE: 18 BRPM

## 2025-02-03 DIAGNOSIS — L85.3 XEROSIS CUTIS: ICD-10-CM

## 2025-02-03 DIAGNOSIS — L60.0 INGROWING NAIL: ICD-10-CM

## 2025-02-03 DIAGNOSIS — E11.40 TYPE 2 DIABETES MELLITUS WITH DIABETIC NEUROPATHY, UNSPECIFIED: ICD-10-CM

## 2025-02-03 DIAGNOSIS — Z98.890 OTHER SPECIFIED POSTPROCEDURAL STATES: Chronic | ICD-10-CM

## 2025-02-03 DIAGNOSIS — E10.65 TYPE 1 DIABETES MELLITUS WITH HYPERGLYCEMIA: ICD-10-CM

## 2025-02-03 PROCEDURE — G0463: CPT

## 2025-02-03 PROCEDURE — 99213 OFFICE O/P EST LOW 20 MIN: CPT

## 2025-02-03 PROCEDURE — 99214 OFFICE O/P EST MOD 30 MIN: CPT

## 2025-02-03 PROCEDURE — G2211 COMPLEX E/M VISIT ADD ON: CPT

## 2025-02-04 DIAGNOSIS — Z79.890 HORMONE REPLACEMENT THERAPY: ICD-10-CM

## 2025-02-04 DIAGNOSIS — E10.40 TYPE 1 DIABETES MELLITUS WITH DIABETIC NEUROPATHY, UNSPECIFIED: ICD-10-CM

## 2025-02-04 DIAGNOSIS — E78.5 HYPERLIPIDEMIA, UNSPECIFIED: ICD-10-CM

## 2025-02-04 DIAGNOSIS — Z98.49 CATARACT EXTRACTION STATUS, UNSPECIFIED EYE: ICD-10-CM

## 2025-02-04 DIAGNOSIS — Z79.899 OTHER LONG TERM (CURRENT) DRUG THERAPY: ICD-10-CM

## 2025-02-04 DIAGNOSIS — Z98.890 OTHER SPECIFIED POSTPROCEDURAL STATES: ICD-10-CM

## 2025-02-04 DIAGNOSIS — E05.00 THYROTOXICOSIS WITH DIFFUSE GOITER WITHOUT THYROTOXIC CRISIS OR STORM: ICD-10-CM

## 2025-02-04 DIAGNOSIS — Z82.49 FAMILY HISTORY OF ISCHEMIC HEART DISEASE AND OTHER DISEASES OF THE CIRCULATORY SYSTEM: ICD-10-CM

## 2025-02-04 DIAGNOSIS — Z79.82 LONG TERM (CURRENT) USE OF ASPIRIN: ICD-10-CM

## 2025-02-04 DIAGNOSIS — D64.9 ANEMIA, UNSPECIFIED: ICD-10-CM

## 2025-02-04 DIAGNOSIS — I10 ESSENTIAL (PRIMARY) HYPERTENSION: ICD-10-CM

## 2025-02-04 DIAGNOSIS — Z79.4 LONG TERM (CURRENT) USE OF INSULIN: ICD-10-CM

## 2025-02-04 DIAGNOSIS — E03.9 HYPOTHYROIDISM, UNSPECIFIED: ICD-10-CM

## 2025-02-04 DIAGNOSIS — Z87.440 PERSONAL HISTORY OF URINARY (TRACT) INFECTIONS: ICD-10-CM

## 2025-02-04 DIAGNOSIS — E55.9 VITAMIN D DEFICIENCY, UNSPECIFIED: ICD-10-CM

## 2025-02-21 ENCOUNTER — NON-APPOINTMENT (OUTPATIENT)
Age: 75
End: 2025-02-21

## 2025-02-26 ENCOUNTER — APPOINTMENT (OUTPATIENT)
Dept: ENDOCRINOLOGY | Facility: CLINIC | Age: 75
End: 2025-02-26
Payer: MEDICARE

## 2025-02-26 PROCEDURE — 97803 MED NUTRITION INDIV SUBSEQ: CPT | Mod: GA

## 2025-02-27 ENCOUNTER — NON-APPOINTMENT (OUTPATIENT)
Age: 75
End: 2025-02-27

## 2025-03-03 ENCOUNTER — APPOINTMENT (OUTPATIENT)
Dept: PODIATRY | Facility: CLINIC | Age: 75
End: 2025-03-03

## 2025-03-04 PROBLEM — B35.1 ONYCHOMYCOSIS OF TOENAIL: Status: ACTIVE | Noted: 2025-03-04

## 2025-03-04 PROBLEM — B35.1 ONYCHOMYCOSIS: Status: ACTIVE | Noted: 2025-03-04

## 2025-03-20 ENCOUNTER — APPOINTMENT (OUTPATIENT)
Dept: ENDOCRINOLOGY | Facility: CLINIC | Age: 75
End: 2025-03-20

## 2025-04-22 ENCOUNTER — NON-APPOINTMENT (OUTPATIENT)
Age: 75
End: 2025-04-22

## 2025-04-22 ENCOUNTER — APPOINTMENT (OUTPATIENT)
Dept: CARDIOLOGY | Facility: CLINIC | Age: 75
End: 2025-04-22
Payer: MEDICARE

## 2025-04-22 VITALS
SYSTOLIC BLOOD PRESSURE: 179 MMHG | DIASTOLIC BLOOD PRESSURE: 75 MMHG | WEIGHT: 138 LBS | OXYGEN SATURATION: 97 % | HEIGHT: 60 IN | BODY MASS INDEX: 27.09 KG/M2 | HEART RATE: 61 BPM

## 2025-04-22 DIAGNOSIS — I10 ESSENTIAL (PRIMARY) HYPERTENSION: ICD-10-CM

## 2025-04-22 PROCEDURE — 99214 OFFICE O/P EST MOD 30 MIN: CPT

## 2025-04-22 PROCEDURE — 93000 ELECTROCARDIOGRAM COMPLETE: CPT

## 2025-04-28 ENCOUNTER — APPOINTMENT (OUTPATIENT)
Dept: INTERNAL MEDICINE | Facility: CLINIC | Age: 75
End: 2025-04-28
Payer: MEDICARE

## 2025-04-28 VITALS
HEART RATE: 74 BPM | OXYGEN SATURATION: 98 % | HEIGHT: 60 IN | WEIGHT: 138 LBS | BODY MASS INDEX: 27.09 KG/M2 | TEMPERATURE: 98.1 F | RESPIRATION RATE: 16 BRPM | SYSTOLIC BLOOD PRESSURE: 122 MMHG | DIASTOLIC BLOOD PRESSURE: 78 MMHG

## 2025-04-28 DIAGNOSIS — D64.9 ANEMIA, UNSPECIFIED: ICD-10-CM

## 2025-04-28 DIAGNOSIS — E53.8 DEFICIENCY OF OTHER SPECIFIED B GROUP VITAMINS: ICD-10-CM

## 2025-04-28 DIAGNOSIS — Z00.00 ENCOUNTER FOR GENERAL ADULT MEDICAL EXAMINATION W/OUT ABNORMAL FINDINGS: ICD-10-CM

## 2025-04-28 PROCEDURE — G0439: CPT

## 2025-05-01 LAB
ALBUMIN SERPL ELPH-MCNC: 4.2 G/DL
ALP BLD-CCNC: 99 U/L
ALT SERPL-CCNC: 97 U/L
ANION GAP SERPL CALC-SCNC: 10 MMOL/L
AST SERPL-CCNC: 52 U/L
BILIRUB SERPL-MCNC: 0.3 MG/DL
BUN SERPL-MCNC: 22 MG/DL
CALCIUM SERPL-MCNC: 9.3 MG/DL
CHLORIDE SERPL-SCNC: 99 MMOL/L
CHOLEST SERPL-MCNC: 193 MG/DL
CO2 SERPL-SCNC: 30 MMOL/L
CREAT SERPL-MCNC: 0.68 MG/DL
CREAT SPEC-SCNC: 95 MG/DL
CREAT/PROT UR: 0.1 RATIO
EGFRCR SERPLBLD CKD-EPI 2021: 90 ML/MIN/1.73M2
FERRITIN SERPL-MCNC: 113 NG/ML
FOLATE SERPL-MCNC: >20 NG/ML
GLUCOSE SERPL-MCNC: 176 MG/DL
HCT VFR BLD CALC: 36.5 %
HDLC SERPL-MCNC: 78 MG/DL
HGB BLD-MCNC: 12 G/DL
IRON SATN MFR SERPL: 29 %
IRON SERPL-MCNC: 97 UG/DL
LDLC SERPL-MCNC: 102 MG/DL
MCHC RBC-ENTMCNC: 31.3 PG
MCHC RBC-ENTMCNC: 32.9 G/DL
MCV RBC AUTO: 95.3 FL
NONHDLC SERPL-MCNC: 115 MG/DL
PLATELET # BLD AUTO: 246 K/UL
POTASSIUM SERPL-SCNC: 4.2 MMOL/L
PROT SERPL-MCNC: 6.5 G/DL
PROT UR-MCNC: 12 MG/DL
RBC # BLD: 3.83 M/UL
RBC # FLD: 14 %
SODIUM SERPL-SCNC: 139 MMOL/L
TIBC SERPL-MCNC: 336 UG/DL
TRIGL SERPL-MCNC: 72 MG/DL
UIBC SERPL-MCNC: 238 UG/DL
VIT B12 SERPL-MCNC: 1349 PG/ML
WBC # FLD AUTO: 6.88 K/UL

## 2025-05-02 ENCOUNTER — APPOINTMENT (OUTPATIENT)
Dept: ORTHOPEDIC SURGERY | Facility: CLINIC | Age: 75
End: 2025-05-02
Payer: MEDICARE

## 2025-05-02 VITALS — WEIGHT: 138 LBS | BODY MASS INDEX: 27.09 KG/M2 | HEIGHT: 60 IN

## 2025-05-02 DIAGNOSIS — M65.322 TRIGGER FINGER, LEFT INDEX FINGER: ICD-10-CM

## 2025-05-02 PROCEDURE — 99203 OFFICE O/P NEW LOW 30 MIN: CPT | Mod: 25

## 2025-05-02 PROCEDURE — J3490M: CUSTOM | Mod: NC,JZ

## 2025-05-02 PROCEDURE — 20550 NJX 1 TENDON SHEATH/LIGAMENT: CPT | Mod: F1

## 2025-05-05 ENCOUNTER — APPOINTMENT (OUTPATIENT)
Dept: ORTHOPEDIC SURGERY | Facility: CLINIC | Age: 75
End: 2025-05-05

## 2025-05-05 ENCOUNTER — APPOINTMENT (OUTPATIENT)
Dept: ORTHOPEDIC SURGERY | Facility: CLINIC | Age: 75
End: 2025-05-05
Payer: MEDICARE

## 2025-05-05 VITALS — BODY MASS INDEX: 23.92 KG/M2 | WEIGHT: 130 LBS | HEIGHT: 62 IN

## 2025-05-05 DIAGNOSIS — R41.3 OTHER AMNESIA: ICD-10-CM

## 2025-05-05 DIAGNOSIS — E11.9 TYPE 2 DIABETES MELLITUS W/OUT COMPLICATIONS: ICD-10-CM

## 2025-05-05 DIAGNOSIS — M47.816 SPONDYLOSIS W/OUT MYELOPATHY OR RADICULOPATHY, LUMBAR REGION: ICD-10-CM

## 2025-05-05 DIAGNOSIS — M16.12 UNILATERAL PRIMARY OSTEOARTHRITIS, LEFT HIP: ICD-10-CM

## 2025-05-05 DIAGNOSIS — E78.00 PURE HYPERCHOLESTEROLEMIA, UNSPECIFIED: ICD-10-CM

## 2025-05-05 DIAGNOSIS — E10.9 TYPE 1 DIABETES MELLITUS W/OUT COMPLICATIONS: ICD-10-CM

## 2025-05-05 DIAGNOSIS — M41.50 OTHER SECONDARY SCOLIOSIS, SITE UNSPECIFIED: ICD-10-CM

## 2025-05-05 DIAGNOSIS — Z86.79 PERSONAL HISTORY OF OTHER DISEASES OF THE CIRCULATORY SYSTEM: ICD-10-CM

## 2025-05-05 DIAGNOSIS — Z86.39 PERSONAL HISTORY OF OTHER ENDOCRINE, NUTRITIONAL AND METABOLIC DISEASE: ICD-10-CM

## 2025-05-05 DIAGNOSIS — M70.62 TROCHANTERIC BURSITIS, LEFT HIP: ICD-10-CM

## 2025-05-05 DIAGNOSIS — Z86.59 PERSONAL HISTORY OF OTHER MENTAL AND BEHAVIORAL DISORDERS: ICD-10-CM

## 2025-05-05 PROCEDURE — 20610 DRAIN/INJ JOINT/BURSA W/O US: CPT | Mod: LT

## 2025-05-05 PROCEDURE — 99214 OFFICE O/P EST MOD 30 MIN: CPT | Mod: 25

## 2025-05-05 PROCEDURE — 73503 X-RAY EXAM HIP UNI 4/> VIEWS: CPT | Mod: LT

## 2025-05-05 PROCEDURE — 72100 X-RAY EXAM L-S SPINE 2/3 VWS: CPT

## 2025-05-13 ENCOUNTER — RX RENEWAL (OUTPATIENT)
Age: 75
End: 2025-05-13

## 2025-05-23 ENCOUNTER — APPOINTMENT (OUTPATIENT)
Dept: ENDOCRINOLOGY | Facility: CLINIC | Age: 75
End: 2025-05-23
Payer: MEDICARE

## 2025-05-23 VITALS
WEIGHT: 136 LBS | HEIGHT: 62 IN | DIASTOLIC BLOOD PRESSURE: 60 MMHG | HEART RATE: 67 BPM | BODY MASS INDEX: 25.03 KG/M2 | OXYGEN SATURATION: 98 % | SYSTOLIC BLOOD PRESSURE: 112 MMHG

## 2025-05-23 DIAGNOSIS — E10.65 TYPE 1 DIABETES MELLITUS WITH HYPERGLYCEMIA: ICD-10-CM

## 2025-05-23 DIAGNOSIS — E78.5 HYPERLIPIDEMIA, UNSPECIFIED: ICD-10-CM

## 2025-05-23 DIAGNOSIS — I10 ESSENTIAL (PRIMARY) HYPERTENSION: ICD-10-CM

## 2025-05-23 DIAGNOSIS — E03.9 HYPOTHYROIDISM, UNSPECIFIED: ICD-10-CM

## 2025-05-23 DIAGNOSIS — E05.00 THYROTOXICOSIS WITH DIFFUSE GOITER W/OUT THYROTOXIC CRISIS OR STORM: ICD-10-CM

## 2025-05-23 PROCEDURE — G2211 COMPLEX E/M VISIT ADD ON: CPT

## 2025-05-23 PROCEDURE — 99214 OFFICE O/P EST MOD 30 MIN: CPT

## 2025-05-23 PROCEDURE — 95251 CONT GLUC MNTR ANALYSIS I&R: CPT

## 2025-05-30 ENCOUNTER — NON-APPOINTMENT (OUTPATIENT)
Age: 75
End: 2025-05-30

## 2025-05-30 LAB
ALBUMIN SERPL ELPH-MCNC: 4.2 G/DL
ALP BLD-CCNC: 87 U/L
ALT SERPL-CCNC: 61 U/L
ANION GAP SERPL CALC-SCNC: 15 MMOL/L
AST SERPL-CCNC: 35 U/L
BILIRUB SERPL-MCNC: 0.3 MG/DL
BUN SERPL-MCNC: 22 MG/DL
CALCIUM SERPL-MCNC: 9.3 MG/DL
CHLORIDE SERPL-SCNC: 99 MMOL/L
CHOLEST SERPL-MCNC: 205 MG/DL
CO2 SERPL-SCNC: 28 MMOL/L
CREAT SERPL-MCNC: 0.77 MG/DL
CREAT SPEC-SCNC: 85 MG/DL
EGFRCR SERPLBLD CKD-EPI 2021: 80 ML/MIN/1.73M2
ESTIMATED AVERAGE GLUCOSE: 194 MG/DL
GLUCOSE SERPL-MCNC: 143 MG/DL
HBA1C MFR BLD HPLC: 8.4 %
HDLC SERPL-MCNC: 92 MG/DL
LDLC SERPL-MCNC: 102 MG/DL
MICROALBUMIN 24H UR DL<=1MG/L-MCNC: 2.8 MG/DL
MICROALBUMIN/CREAT 24H UR-RTO: 33 MG/G
NONHDLC SERPL-MCNC: 113 MG/DL
POTASSIUM SERPL-SCNC: 4.3 MMOL/L
PROT SERPL-MCNC: 6.6 G/DL
SODIUM SERPL-SCNC: 141 MMOL/L
T4 FREE SERPL-MCNC: 1.4 NG/DL
TRIGL SERPL-MCNC: 61 MG/DL
TSH SERPL-ACNC: 1.72 UIU/ML

## 2025-05-30 RX ORDER — ROSUVASTATIN CALCIUM 5 MG/1
5 TABLET, FILM COATED ORAL
Qty: 90 | Refills: 1 | Status: ACTIVE | COMMUNITY
Start: 2025-05-30 | End: 1900-01-01

## 2025-07-10 ENCOUNTER — APPOINTMENT (OUTPATIENT)
Dept: WOUND CARE | Facility: HOSPITAL | Age: 75
End: 2025-07-10

## 2025-07-16 NOTE — ASSESSMENT
[FreeTextEntry1] : 70 y/o female with Type 1 DM, Hypothyroidism, and Hyperlipidemia. \par \par Plan: \par Type 1 DM: suboptimal control  \par Rose reviewd along with pump \par willtry to do overnoiht bsal check \par  makes sure she is accurately coutnign carbs -\par \par \par Rose reviewd  avg   +/- 72.1  Coeff of variaiton 41% \par Very High >250 10%\par  high  181-250  24% \par 60% in targert range  \par low 54-59  4% \par very low  < 54  2% \par worn 90% of time  \par \par - educated on healthy food choices\par - encouraged to continue routine exercise\par \par \par Hypothyroidism: Cont Lt4  150 mcg qd \par - repeat TFTs in 4 weeks\par \par 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 
PAST SURGICAL HISTORY:  H/O neck surgery removal of sebaceous cyst     H/O:      S/P abdominoplasty

## 2025-07-18 ENCOUNTER — APPOINTMENT (OUTPATIENT)
Dept: ENDOCRINOLOGY | Facility: CLINIC | Age: 75
End: 2025-07-18
Payer: MEDICARE

## 2025-07-18 PROCEDURE — 97803 MED NUTRITION INDIV SUBSEQ: CPT | Mod: GA

## 2025-07-22 ENCOUNTER — APPOINTMENT (OUTPATIENT)
Dept: INTERNAL MEDICINE | Facility: CLINIC | Age: 75
End: 2025-07-22
Payer: MEDICARE

## 2025-07-22 ENCOUNTER — LABORATORY RESULT (OUTPATIENT)
Age: 75
End: 2025-07-22

## 2025-07-22 VITALS
OXYGEN SATURATION: 99 % | HEIGHT: 62 IN | DIASTOLIC BLOOD PRESSURE: 60 MMHG | TEMPERATURE: 97.8 F | RESPIRATION RATE: 16 BRPM | BODY MASS INDEX: 25.76 KG/M2 | HEART RATE: 64 BPM | SYSTOLIC BLOOD PRESSURE: 112 MMHG | WEIGHT: 140 LBS

## 2025-07-22 DIAGNOSIS — R35.0 FREQUENCY OF MICTURITION: ICD-10-CM

## 2025-07-22 PROCEDURE — G2211 COMPLEX E/M VISIT ADD ON: CPT

## 2025-07-22 PROCEDURE — 99213 OFFICE O/P EST LOW 20 MIN: CPT

## 2025-07-22 PROCEDURE — 81003 URINALYSIS AUTO W/O SCOPE: CPT | Mod: QW

## 2025-07-23 ENCOUNTER — TRANSCRIPTION ENCOUNTER (OUTPATIENT)
Age: 75
End: 2025-07-23

## 2025-07-23 LAB
ALBUMIN SERPL ELPH-MCNC: 4.2 G/DL
ALP BLD-CCNC: 87 U/L
ALT SERPL-CCNC: 40 U/L
ANION GAP SERPL CALC-SCNC: 12 MMOL/L
APPEARANCE: CLEAR
AST SERPL-CCNC: 36 U/L
BILIRUB SERPL-MCNC: 0.3 MG/DL
BILIRUBIN URINE: NEGATIVE
BLOOD URINE: NEGATIVE
BUN SERPL-MCNC: 23 MG/DL
CALCIUM SERPL-MCNC: 9.5 MG/DL
CHLORIDE SERPL-SCNC: 103 MMOL/L
CO2 SERPL-SCNC: 28 MMOL/L
COLOR: YELLOW
CREAT SERPL-MCNC: 0.72 MG/DL
EGFRCR SERPLBLD CKD-EPI 2021: 86 ML/MIN/1.73M2
ESTIMATED AVERAGE GLUCOSE: 192 MG/DL
GLUCOSE QUALITATIVE U: NEGATIVE MG/DL
GLUCOSE SERPL-MCNC: 116 MG/DL
HBA1C MFR BLD HPLC: 8.3 %
HCT VFR BLD CALC: 37.5 %
HGB BLD-MCNC: 12.3 G/DL
KETONES URINE: NEGATIVE MG/DL
LEUKOCYTE ESTERASE URINE: ABNORMAL
MCHC RBC-ENTMCNC: 31.3 PG
MCHC RBC-ENTMCNC: 32.8 G/DL
MCV RBC AUTO: 95.4 FL
NITRITE URINE: NEGATIVE
PH URINE: 7
PLATELET # BLD AUTO: 237 K/UL
POTASSIUM SERPL-SCNC: 4 MMOL/L
PROT SERPL-MCNC: 6.9 G/DL
PROTEIN URINE: NEGATIVE MG/DL
RBC # BLD: 3.93 M/UL
RBC # FLD: 13.9 %
SODIUM SERPL-SCNC: 142 MMOL/L
SPECIFIC GRAVITY URINE: 1.02
UROBILINOGEN URINE: 0.2 MG/DL
WBC # FLD AUTO: 6.95 K/UL

## 2025-07-25 ENCOUNTER — NON-APPOINTMENT (OUTPATIENT)
Age: 75
End: 2025-07-25

## 2025-07-25 DIAGNOSIS — E11.40 TYPE 2 DIABETES MELLITUS WITH DIABETIC NEUROPATHY, UNSPECIFIED: ICD-10-CM

## 2025-07-25 PROCEDURE — 95251 CONT GLUC MNTR ANALYSIS I&R: CPT

## 2025-08-19 ENCOUNTER — APPOINTMENT (OUTPATIENT)
Dept: INTERNAL MEDICINE | Facility: CLINIC | Age: 75
End: 2025-08-19
Payer: MEDICARE

## 2025-08-19 VITALS
SYSTOLIC BLOOD PRESSURE: 116 MMHG | OXYGEN SATURATION: 98 % | TEMPERATURE: 97.8 F | WEIGHT: 140 LBS | HEIGHT: 61 IN | BODY MASS INDEX: 26.43 KG/M2 | HEART RATE: 69 BPM | RESPIRATION RATE: 16 BRPM | DIASTOLIC BLOOD PRESSURE: 50 MMHG

## 2025-08-19 DIAGNOSIS — F03.911 UNSPECIFIED DEMENTIA, UNSPECIFIED SEVERITY, WITH AGITATION: ICD-10-CM

## 2025-08-19 PROCEDURE — 93000 ELECTROCARDIOGRAM COMPLETE: CPT

## 2025-08-19 PROCEDURE — G2211 COMPLEX E/M VISIT ADD ON: CPT

## 2025-08-19 PROCEDURE — 99213 OFFICE O/P EST LOW 20 MIN: CPT
